# Patient Record
Sex: FEMALE | Race: WHITE | Employment: OTHER | URBAN - METROPOLITAN AREA
[De-identification: names, ages, dates, MRNs, and addresses within clinical notes are randomized per-mention and may not be internally consistent; named-entity substitution may affect disease eponyms.]

---

## 2017-01-11 DIAGNOSIS — F41.9 ANXIETY: ICD-10-CM

## 2017-01-16 RX ORDER — LORAZEPAM 0.5 MG/1
TABLET ORAL
Qty: 30 TAB | Refills: 0 | OUTPATIENT
Start: 2017-01-16 | End: 2017-03-16 | Stop reason: SDUPTHER

## 2017-01-16 NOTE — TELEPHONE ENCOUNTER
Attempted to contact patient without success.  Left voicemail message on home number requesting a call back to the office to schedule follow up appointment

## 2017-01-16 NOTE — TELEPHONE ENCOUNTER
Orders Placed This Encounter    LORazepam (ATIVAN) 0.5 mg tablet     Sig: TAKE 1 TABLET BY MOUTH EVERY DAY AS NEEDED     Dispense:  30 Tab     Refill:  0       reviewed 1/16/2017   Patient scheduled for 2/27/17

## 2017-02-24 ENCOUNTER — HOSPITAL ENCOUNTER (OUTPATIENT)
Dept: MAMMOGRAPHY | Age: 62
Discharge: HOME | End: 2017-02-24
Attending: INTERNAL MEDICINE
Payer: COMMERCIAL

## 2017-02-24 ENCOUNTER — HOSPITAL ENCOUNTER (OUTPATIENT)
Dept: LAB | Age: 62
Discharge: HOME | End: 2017-02-24
Payer: COMMERCIAL

## 2017-02-24 ENCOUNTER — OFFICE VISIT (OUTPATIENT)
Dept: INTERNAL MEDICINE CLINIC | Age: 62
End: 2017-02-24

## 2017-02-24 VITALS
RESPIRATION RATE: 18 BRPM | SYSTOLIC BLOOD PRESSURE: 140 MMHG | WEIGHT: 193.8 LBS | DIASTOLIC BLOOD PRESSURE: 80 MMHG | HEART RATE: 61 BPM | HEIGHT: 67 IN | BODY MASS INDEX: 30.42 KG/M2 | TEMPERATURE: 99.2 F | OXYGEN SATURATION: 98 %

## 2017-02-24 DIAGNOSIS — Z11.59 SCREENING FOR VIRAL DISEASE: ICD-10-CM

## 2017-02-24 DIAGNOSIS — Z01.419 WELL WOMAN EXAM WITH ROUTINE GYNECOLOGICAL EXAM: Primary | ICD-10-CM

## 2017-02-24 DIAGNOSIS — Z12.31 SCREENING MAMMOGRAM, ENCOUNTER FOR: ICD-10-CM

## 2017-02-24 DIAGNOSIS — F17.200 SMOKING: ICD-10-CM

## 2017-02-24 DIAGNOSIS — Z01.419 WELL WOMAN EXAM WITH ROUTINE GYNECOLOGICAL EXAM: ICD-10-CM

## 2017-02-24 DIAGNOSIS — E66.9 OBESITY (BMI 30-39.9): ICD-10-CM

## 2017-02-24 DIAGNOSIS — F41.9 ANXIETY: ICD-10-CM

## 2017-02-24 PROBLEM — Z79.899 CONTROLLED SUBSTANCE AGREEMENT SIGNED: Chronic | Status: ACTIVE | Noted: 2017-02-24

## 2017-02-24 PROBLEM — Z71.89 ADVANCE DIRECTIVE DISCUSSED WITH PATIENT: Chronic | Status: ACTIVE | Noted: 2017-02-24

## 2017-02-24 PROCEDURE — 87624 HPV HI-RISK TYP POOLED RSLT: CPT | Performed by: INTERNAL MEDICINE

## 2017-02-24 PROCEDURE — 88175 CYTOPATH C/V AUTO FLUID REDO: CPT | Performed by: INTERNAL MEDICINE

## 2017-02-24 PROCEDURE — 77067 SCR MAMMO BI INCL CAD: CPT

## 2017-02-24 RX ORDER — CLARITHROMYCIN 500 MG/1
TABLET, FILM COATED, EXTENDED RELEASE ORAL
COMMUNITY
Start: 2017-02-22 | End: 2017-02-24 | Stop reason: ALTCHOICE

## 2017-02-24 RX ORDER — PREDNISONE 20 MG/1
TABLET ORAL
COMMUNITY
Start: 2017-02-22 | End: 2017-02-24 | Stop reason: ALTCHOICE

## 2017-02-24 RX ORDER — DIAZEPAM 10 MG/1
TABLET ORAL
Refills: 0 | COMMUNITY
Start: 2017-02-05 | End: 2017-02-24 | Stop reason: ALTCHOICE

## 2017-02-24 RX ORDER — HYDROCODONE BITARTRATE AND ACETAMINOPHEN 7.5; 325 MG/1; MG/1
TABLET ORAL
Refills: 0 | COMMUNITY
Start: 2017-02-08 | End: 2017-02-24 | Stop reason: ALTCHOICE

## 2017-02-24 NOTE — MR AVS SNAPSHOT
Visit Information     Date & Time Provider Department Dept. Phone Encounter #    2/24/2017 10:00 AM Radha Johns MD [place] [id number] [facility] [phone] [id number]      Follow-up Instructions     Return in about 1 year (around 2/24/2018). Upcoming Health Maintenance        Date Due    BREAST CANCER SCRN MAMMOGRAM 12/22/2017    COLONOSCOPY 10/13/2019    PAP AKA CERVICAL CYTOLOGY 2/24/2020    DTaP/Tdap/Td series (3 - Td) 4/16/2026      Allergies as of 2/24/2017  Review Complete On: 2/24/2017 By: Radha Johns MD       Severity Noted Reaction Type Reactions    Sulfa (Sulfonamide Antibiotics) Medium 02/15/2016   Side Effect Hives    1 week after starting Bactrim - Feb 2016    Clindamycin  11/10/2011    Rash    Keflex [Cephalexin]  11/10/2011    Rash    Morphine  10/12/2014    Other (comments)    Dysphoria/anxiety    Pcn [Penicillins]  10/27/2011   Side Effect Rash      Current Immunizations  Reviewed on 10/27/2011    Name Date    Influenza Vaccine 10/17/2015, 11/16/2014, 9/1/2013    Influenza Vaccine Whole 11/1/2003    TDAP Vaccine 10/27/2011 10:38 AM    Tdap 4/16/2016  6:18 PM       Not reviewed this visit   You Were Diagnosed With        Codes Comments    Well woman exam with routine gynecological exam    -  Primary ICD-10-CM: Z01.419  ICD-9-CM: V72.31     Screening mammogram, encounter for     ICD-10-CM: Z12.31  ICD-9-CM: V76.12     Obesity (BMI 30-39. 9)     ICD-10-CM: E66.9  ICD-9-CM: 278.00     Screening for viral disease     ICD-10-CM: Z11.59  ICD-9-CM: V73.99     Smoking     ICD-10-CM: F17.200  ICD-9-CM: 305.1       Vitals     BP                   140/80 (BP 1 Location: Left arm, BP Patient Position: Sitting)           BMI and BSA Data     Body Mass Index Body Surface Area    30.35 kg/m 2 2.04 m 2         Preferred Pharmacy       Pharmacy Name Phone    [id number] [address], [place]  [phone]         Your Updated Medication List This list is accurate as of: 2/24/17 11:10 AM.  Always use your most recent med list.                ascorbic acid (vitamin C) 500 mg tablet   Commonly known as:  VITAMIN C   Take 500 mg by mouth daily. B COMPLEX 1 PO   Take 1 Cap by mouth daily. cetirizine 10 mg tablet   Commonly known as:  ZYRTEC   Take 10 mg by mouth daily as needed for Allergies. cholecalciferol 1,000 unit tablet   Commonly known as:  VITAMIN D3   Take 1,000 Units by mouth daily. fluticasone 50 mcg/actuation nasal spray   Commonly known as:  FLONASE   1 Savage by Both Nostrils route daily. LORazepam 0.5 mg tablet   Commonly known as:  ATIVAN   TAKE 1 TABLET BY MOUTH EVERY DAY AS NEEDED       VITAMIN E PO   Take 1 Cap by mouth daily. We Performed the Following     CBC WITH AUTOMATED DIFF [17935 CPT(R)]     HEPATITIS C AB [07891 CPT(R)]     LIPID PANEL [76094 CPT(R)]     METABOLIC PANEL, COMPREHENSIVE [76068 CPT(R)]     PAP IG, HPV AND RFX HPV 30/12,79([id number]) [02073 CPT(R)]     VITAMIN D, 25 HYDROXY [93679 CPT(R)]       Follow-up Instructions     Return in about 1 year (around 2/24/2018). To-Do List     02/24/2017     Imaging:  [facility] MAMMO BI SCREENING INCL CAD          Introducing [facility]! [employer] introduces [employer] patient portal. Now you can access parts of your medical record, email your doctor's office, and request medication refills online. 1. In your internet browser, go to [email or web address]   2. Click on the First Time User? Click Here link in the Sign In box. You will see the New Member Sign Up page. 3. Enter your BB[employer] Access Code exactly as it appears below. You will not need to use this code after youve completed the sign-up process. If you do not sign up before the expiration date, you must request a new code. · [employer] Access Code: [email or web address]  Expires: 5/25/2017 11:06 AM    4.  Enter the last four digits of your Social Security Number (xxxx) and Date of Birth (mm/dd/yyyy) as indicated and click Submit. You will be taken to the next sign-up page. 5. Create a AptDeco ID. This will be your AptDeco login ID and cannot be changed, so think of one that is secure and easy to remember. 6. Create a AptDeco password. You can change your password at any time. 7. Enter your Password Reset Question and Answer. This can be used at a later time if you forget your password. 8. Enter your e-mail address. You will receive e-mail notification when new information is available in [address] 9. Click Sign Up. You can now view and download portions of your medical record. 10. Click the Download Summary menu link to download a portable copy of your medical information. If you have questions, please visit the Frequently Asked Questions section of the AptDeco website. Remember, AptDeco is NOT to be used for urgent needs. For medical emergencies, dial 911. Now available from your iPhone and Android! Please provide this summary of care documentation to your next provider. Your primary care clinician is listed as Milly Au. If you have any questions after today's visit, please call [phone].

## 2017-02-24 NOTE — PROGRESS NOTES
Subjective:      Kingsley Renner is a 63 y.o. female who presents today for her well woman's exam.     Gyn care is provided by myself . - last visit was 12/15  Screening mammogram was done at [facility] - date- 12/15. Going today  Screening colonoscopy was last completed 2014, with Dr. Tien Quiroz -  Nothing regular    Still smoking 15 cigarettes daily. Not ready to quit  Tuesday evening she went to [facility] for URI and given antibiotic and prednisone. Feeling a little better. 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 01XA     Current Outpatient Prescriptions   Medication Sig Dispense Refill    LORazepam (ATIVAN) 0.5 mg tablet TAKE 1 TABLET BY MOUTH EVERY DAY AS NEEDED 30 Tab 0    cholecalciferol (VITAMIN D3) 1,000 unit tablet Take 1,000 Units by mouth daily.  fluticasone (FLONASE) 50 mcg/actuation nasal spray 1 Johnson by Both Nostrils route daily.  cetirizine (ZYRTEC) 10 mg tablet Take 10 mg by mouth daily as needed for Allergies.  ascorbic acid (VITAMIN C) 500 mg tablet Take 500 mg by mouth daily.  VITAMIN E ACETATE (VITAMIN E PO) Take 1 Cap by mouth daily.  VITAMIN B COMPLEX (B COMPLEX 1 PO) Take 1 Cap by mouth daily. Review of Systems    A comprehensive review of systems was negative except for that written in the HPI. Objective:      Wt Readings from Last 3 Encounters:   02/24/17 193 lb 12.8 oz (87.9 kg)   11/22/16 195 lb (88.5 kg)   11/08/16 193 lb (87.5 kg)     Temp Readings from Last 3 Encounters:   02/24/17 99.2 °F (37.3 °C) (Oral)   11/22/16 98 °F (36.7 °C) (Oral) 11/08/16 97.1 °F (36.2 °C) (Oral)     BP Readings from Last 3 Encounters:   02/24/17 140/80   11/22/16 120/82   11/08/16 130/62     Pulse Readings from Last 3 Encounters:   02/24/17 61   11/22/16 71   11/08/16 66      Visit Vitals    /80 (BP 1 Location: Left arm, BP Patient Position: Sitting)    Pulse 61    Temp 99.2 °F (37.3 °C) (Oral)    Resp 18    Ht 5' 7\" (1.702 m)    Wt 193 lb 12.8 oz (87.9 kg)    LMP 10/28/2002    SpO2 98%    BMI 30.35 kg/m2     General:  Alert, cooperative, no distress, appears stated age. Head:  Normocephalic, without obvious abnormality, atraumatic. Eyes:  Conjunctivae/corneas clear. PERRL, EOMs intact. Ears:  Normal TMs and external ear canals both ears. Nose: Nares normal. Septum midline. Mucosa normal. No drainage or sinus tenderness. Throat: Lips, mucosa, and tongue normal. Teeth and gums normal.   Neck: Supple, symmetrical, trachea midline, no adenopathy, thyroid: no enlargement/tenderness/nodules, no carotid bruit and no JVD. Back:   Symmetric, no curvature. ROM normal. No CVA tenderness. Lungs:   Clear to auscultation bilaterally. Heart:  Regular rate and rhythm, S1, S2 normal, no murmur, click, rub or gallop. Breast Exam:  No tenderness, masses, or nipple abnormality. Abdomen:   Soft, non-tender. Bowel sounds normal. No masses,  No organomegaly. Vaginal:  External genitalia in normal limits. Vaginal vault without discharge. Cervix intact without abnormality. No CMT       Extremities: Extremities normal, atraumatic, no cyanosis or edema. Pulses: 2+ and symmetric all extremities. Skin: Skin color, texture, turgor normal. No rashes or lesions. Lymph nodes: Cervical, supraclavicular, and axillary nodes normal.           Assessment/Plan:     1. Well woman exam with routine gynecological exam  -pap done today  -she is getting mammogram today. - [facility] MAMMO BI SCREENING INCL CAD;  Future  - CBC WITH AUTOMATED DIFF  - METABOLIC PANEL, COMPREHENSIVE  - LIPID PANEL  - VITAMIN D, 25 HYDROXY  - PAP IG, HPV AND RFX HPV 16/78,65([id number])    2. Screening mammogram, encounter for    - [facility] MAMMO BI SCREENING INCL CAD; Future    Also, she has additional complaints of the following --.     3. Obesity (BMI 30-39.9)  -encouraged her to work on increasing her activity. Encouraged her to walk briskly for     4. Screening for viral disease    - HEPATITIS C AB    5. Smoking  -she is not ready to quit.    -encouraged at least reducing from her current 15 cigarettes to 10 cigarettes    6. Anxiety  -using ativan prn very sparingly  -controlled substance agreement signed for use of ativan. Orders Placed This Encounter    [facility] MAMMO BI SCREENING INCL CAD     Standing Status:   Future     Number of Occurrences:   1     Standing Expiration Date:   8/25/2017     Order Specific Question:   Reason for Exam     Answer:   breast canc screen    CBC WITH AUTOMATED DIFF    METABOLIC PANEL, COMPREHENSIVE    LIPID PANEL    VITAMIN D, 25 HYDROXY    HEPATITIS C AB    DISCONTD: FLUCELVAX QUAD 6723-8749, PF, syrg injection     Sig: TO BE ADMINISTERED BY PHARMACIST FOR IMMUNIZATION     Refill:  0    DISCONTD: predniSONE (DELTASONE) 20 mg tablet    DISCONTD: CHERATUSSIN AC  mg/5 mL solution    DISCONTD: clarithromycin XL (BIAXIN XL) 500 mg SR tablet    DISCONTD: HYDROcodone-acetaminophen (NORCO) 7.5-325 mg per tablet     Sig: TAKE 1 TABLET EVERY 4 HOURS AS NEEDED FOR PAIN     Refill:  0    DISCONTD: diazePAM (VALIUM) 10 mg tablet     Sig: TAKE 1 TABLET BY MOUTH 60 MINUTES BEFORE APPOINTMENT WITH A SMALL SIP OF WATER     Refill:  0    PAP IG, HPV AND RFX HPV 83/53,46([id number])     Order Specific Question:   Pap Source? Answer:   Cervical     Order Specific Question:   Total Hysterectomy? Answer:   No     Order Specific Question:   Supracervical Hysterectomy?      Answer:   No     Order Specific Question:   Post Menopausal?     Answer:   Yes     Order Specific Question:   Hormone Therapy? Answer:   No     Order Specific Question:   IUD? Answer:   No     Order Specific Question:   Abnormal Bleeding? Answer:   No     Order Specific Question:   Pregnant     Answer:   No     Order Specific Question:   Post Partum? Answer:   No     Order Specific Question:   Pap collection method? Answer:   broom         Follow-up Disposition:     Follow up yearly      Return if symptoms worsen or fail to improve. Advised patient to call back or return to office if symptoms worsen/change/persist.     Discussed expected course/resolution/complications of diagnosis in detail with patient. Medication risks/benefits/costs/interactions/alternatives discussed with patient. Patient was given an after visit summary which includes diagnoses, current medications, & vitals. Patient expressed understanding with the diagnosis and plan.

## 2017-03-16 DIAGNOSIS — F41.9 ANXIETY: ICD-10-CM

## 2017-03-17 RX ORDER — LORAZEPAM 0.5 MG/1
0.5 TABLET ORAL
Qty: 30 TAB | Refills: 1 | OUTPATIENT
Start: 2017-03-17 | End: 2017-10-11 | Stop reason: SDUPTHER

## 2017-03-17 NOTE — TELEPHONE ENCOUNTER
Orders Placed This Encounter    LORazepam (ATIVAN) 0.5 mg tablet     Sig: Take 1 Tab by mouth daily as needed for Anxiety.      Dispense:  30 Tab     Refill:  1     Not to exceed 5 additional fills before 07/15/2017       reviewed 3/17/2017

## 2017-05-03 ENCOUNTER — OFFICE VISIT (OUTPATIENT)
Dept: INTERNAL MEDICINE CLINIC | Age: 62
End: 2017-05-03

## 2017-05-03 VITALS
TEMPERATURE: 98.8 F | WEIGHT: 191 LBS | DIASTOLIC BLOOD PRESSURE: 80 MMHG | HEART RATE: 63 BPM | HEIGHT: 67 IN | OXYGEN SATURATION: 97 % | SYSTOLIC BLOOD PRESSURE: 125 MMHG | BODY MASS INDEX: 29.98 KG/M2 | RESPIRATION RATE: 18 BRPM

## 2017-05-03 DIAGNOSIS — J02.9 ACUTE PHARYNGITIS, UNSPECIFIED ETIOLOGY: ICD-10-CM

## 2017-05-03 DIAGNOSIS — J06.9 URI WITH COUGH AND CONGESTION: Primary | ICD-10-CM

## 2017-05-03 DIAGNOSIS — H66.93 BILATERAL OTITIS MEDIA, UNSPECIFIED CHRONICITY, UNSPECIFIED OTITIS MEDIA TYPE: ICD-10-CM

## 2017-05-03 DIAGNOSIS — J40 BRONCHITIS: ICD-10-CM

## 2017-05-03 RX ORDER — CLARITHROMYCIN 500 MG/1
500 TABLET, FILM COATED ORAL 2 TIMES DAILY
Qty: 14 TAB | Refills: 0 | Status: SHIPPED | OUTPATIENT
Start: 2017-05-03 | End: 2017-05-10

## 2017-05-03 NOTE — MR AVS SNAPSHOT
Visit Information     Date & Time Provider Department Dept. Phone Encounter #    5/3/2017  3:40 PM [facility] [phone] [id number]      Your Appointments     2/26/2018 10:00 AM   COMPLETE 40 with MD J[facility]   Appt Note: cp    [address]           [address] Maintenance        Date Due    INFLUENZA AGE 9 TO ADULT 8/1/2017    BREAST CANCER SCRN MAMMOGRAM 2/24/2019    COLONOSCOPY 10/13/2019    PAP AKA CERVICAL CYTOLOGY 2/24/2020    DTaP/Tdap/Td series (3 - Td) 4/16/2026      Allergies as of 5/3/2017  Review Complete On: 5/3/2017 By: Daryl Hernandez NP       Severity Noted Reaction Type Reactions    Sulfa (Sulfonamide Antibiotics) Medium 02/15/2016   Side Effect Hives    1 week after starting Bactrim - Feb 2016    Clindamycin  11/10/2011    Rash    Keflex [Cephalexin]  11/10/2011    Rash    Morphine  10/12/2014    Other (comments)    Dysphoria/anxiety    Pcn [Penicillins]  10/27/2011   Side Effect Rash      Current Immunizations  Reviewed on 10/27/2011    Name Date    Influenza Vaccine 10/17/2015, 11/16/2014, 9/1/2013    Influenza Vaccine Whole 11/1/2003    TDAP Vaccine 10/27/2011 10:38 AM    Tdap 4/16/2016  6:18 PM       Not reviewed this visit   You Were Diagnosed With        Codes Comments    URI with cough and congestion    -  Primary ICD-10-CM: J06.9  ICD-9-CM: 465.9     Bilateral otitis media, unspecified chronicity, unspecified otitis media type     ICD-10-CM: H66.93  ICD-9-CM: 382. 9     Acute pharyngitis, unspecified etiology     ICD-10-CM: J02.9  ICD-9-CM: 462     Bronchitis     ICD-10-CM: J40  ICD-9-CM: 490       Vitals     BP Pulse Temp Resp Height(growth percentile) Weight(growth percentile)    125/80 (BP 1 Location: Left arm, BP Patient Position: Sitting) 63 98.8 °F (37.1 °C) (Oral) 18 5' 7\" (1.702 m) 191 lb (86.6 kg)    LMP SpO2 BMI OB Status Smoking Status       10/28/2002 (Exact Date) 97% 29.91 kg/m2 Postmenopausal Current Every Day Smoker     Vitals History      BMI and BSA Data     Body Mass Index Body Surface Area    29.91 kg/m 2 2.02 m 2         Preferred Pharmacy       Pharmacy Name Phone    [facility] [address], [place] [address] [phone]         Your Updated Medication List          This list is accurate as of: 5/3/17  4:48 PM.  Always use your most recent med list.                ascorbic acid (vitamin C) 500 mg tablet   Commonly known as:  VITAMIN C   Take 500 mg by mouth daily. B COMPLEX 1 PO   Take 1 Cap by mouth daily. cholecalciferol 1,000 unit tablet   Commonly known as:  VITAMIN D3   Take 1,000 Units by mouth daily. clarithromycin 500 mg tablet   Commonly known as:  BIAXIN   Take 1 Tab by mouth two (2) times a day for 7 days. fluticasone 50 mcg/actuation nasal spray   Commonly known as:  FLONASE   1 Blue by Both Nostrils route daily. LORazepam 0.5 mg tablet   Commonly known as:  ATIVAN   Take 1 Tab by mouth daily as needed for Anxiety. Prescriptions Sent to Pharmacy        Refills    clarithromycin (BIAXIN) 500 mg tablet 0    Sig: Take 1 Tab by mouth two (2) times a day for 7 days. Class: Normal    Pharmacy: [address] [place] [address], [address]  #: [phone]    Route: Oral      Patient Instructions    1. Mucinex (Guaifenesen) plain-Blue Box 600 mg. Take one twice daily with full glass water   Take for 10 days    2. Saline Nasal Spray - use liberally to flush post-nasal area; use as many times a day as desired. Keep spraying with head tilted back until you feel need to swallow    3. Drink lots of fluids (mainly water) to keep mucus thinner    4. If needed, for cough, we recommend Delsym cough syrup    5. Long acting antihistamine (Allegra/Fexofenadine or Zyrtec/Ceterizine) is useful if allergy symptoms are also present    6. Decongestants should not be used as they actually contribute to overdrying/thickening of the mucus, and can raise the BP and overstimulate the heart    7. Steroid nasal spray (Nasacort AQ) - 2 sprays each nostril once daily; use with head in upright position       Introducing [facility]! [employer] introduces [employer] patient portal. Now you can access parts of your medical record, email your doctor's office, and request medication refills online. 1. In your internet browser, go to [email or web address]   2. Click on the First Time User? Click Here link in the Sign In box. You will see the New Member Sign Up page. 3. Enter your [employer] Access Code exactly as it appears below. You will not need to use this code after youve completed the sign-up process. If you do not sign up before the expiration date, you must request a new code. · [employer] Access Code: [email or web address]  Expires: 5/25/2017 12:06 PM    4. Enter the last four digits of your Social Security Number (xxxx) and Date of Birth (mm/dd/yyyy) as indicated and click Submit. You will be taken to the next sign-up page. 5. Create a [employer] ID. This will be your [employer] login ID and cannot be changed, so think of one that is secure and easy to remember. 6. Create a [employer] password. You can change your password at any time. 7. Enter your Password Reset Question and Answer. This can be used at a later time if you forget your password. 8. Enter your e-mail address. You will receive e-mail notification when new information is available in [address] 9. Click Sign Up. You can now view and download portions of your medical record. 10. Click the Download Summary menu link to download a portable copy of your medical information.     If you have questions, please visit the Frequently Asked Questions section of the PaperShare website. Remember, PaperShare is NOT to be used for urgent needs. For medical emergencies, dial 911. Now available from your iPhone and Android! Please provide this summary of care documentation to your next provider. Your primary care clinician is listed as Milly Au. If you have any questions after today's visit, please call [phone].

## 2017-05-03 NOTE — PROGRESS NOTES
64 yo female reports for 1 week, she has been having congestion, pressure behind the eyes and facial pain, cough and fatigue. She was seen at an urgent care last week due to raw throat, and was dx w/ tonsillitis and tx w/ Zpack. Then she had nasal congestion, went back to them 2 days later, and was tx w/ a Medrol dospak and Sudafed. She has a crackly cough now and yellow sputum. She is using Flonase, saline NS and Mucinex and Loratadine. She is no longer using the Sudafed. She smokes cigarettes. PE: WNWD WF sounds hoarse   T - 98.8   Phar - red   Neck - no nodes   TMs - red and dull   Lungs - coarse sounds in R mid-lung    Imp: URI w/ cough   Otitis media bilat   Acute pharyngitis   Bronchitis    Plan: Biaxin for 5 days   Probiotic   Stop smoking   Mucinex, Saline NS, hydrate, Nasacort AQ  _______________________  Expected course of current diagnosed problem(s) as well as expected progression and possible complications, and desired follow up with provider are discussed with patient. Patient is encouraged to be back in touch with any questions or concerns. Patient expresses understanding of plan of care. Patient is given AVS which includes diagnoses, current medications, vitals.

## 2017-05-03 NOTE — PROGRESS NOTES
Chief Complaint   Patient presents with    Sinus Pain     x 1 week     Pressure Behind the Eyes     x 1 week     Fatigue     x 1 week 1/2    Cough     yellow mucous    Nasal Congestion

## 2017-05-16 ENCOUNTER — OFFICE VISIT (OUTPATIENT)
Dept: INTERNAL MEDICINE CLINIC | Age: 62
End: 2017-05-16

## 2017-05-16 VITALS
HEIGHT: 67 IN | OXYGEN SATURATION: 98 % | DIASTOLIC BLOOD PRESSURE: 96 MMHG | HEART RATE: 80 BPM | SYSTOLIC BLOOD PRESSURE: 138 MMHG | TEMPERATURE: 98.2 F | BODY MASS INDEX: 29.82 KG/M2 | WEIGHT: 190 LBS | RESPIRATION RATE: 14 BRPM

## 2017-05-16 DIAGNOSIS — Z91.09 ENVIRONMENTAL ALLERGIES: Primary | ICD-10-CM

## 2017-05-16 RX ORDER — LORATADINE 10 MG/1
10 TABLET ORAL DAILY
COMMUNITY
End: 2018-01-16

## 2017-05-16 NOTE — MR AVS SNAPSHOT
Visit Information     Date & Time Provider Department Dept.  Phone Encounter #    5/16/2017 12:20 PM MD [facility] [phone] [id number]      Your Appointments     2/26/2018 10:00 AM   COMPLETE 40 with MD [facility] [facility])   Appt Note: cp    [address], [address]           [phone] Maintenance        Date Due    INFLUENZA AGE 9 TO ADULT 8/1/2017    BREAST CANCER SCRN MAMMOGRAM 2/24/2019    COLONOSCOPY 10/13/2019    PAP AKA CERVICAL CYTOLOGY 2/24/2020    DTaP/Tdap/Td series (3 - Td) 4/16/2026      Allergies as of 5/16/2017  Review Complete On: 5/16/2017 By: Trey Dunne MD       Severity Noted Reaction Type Reactions    Sulfa (Sulfonamide Antibiotics) Medium 02/15/2016   Side Effect Hives    1 week after starting Bactrim - Feb 2016    Clindamycin  11/10/2011    Rash    Keflex [Cephalexin]  11/10/2011    Rash    Morphine  10/12/2014    Other (comments)    Dysphoria/anxiety    Pcn [Penicillins]  10/27/2011   Side Effect Rash      Current Immunizations  Reviewed on 10/27/2011    Name Date    Influenza Vaccine 10/17/2015, 11/16/2014, 9/1/2013    Influenza Vaccine Whole 11/1/2003    TDAP Vaccine 10/27/2011 10:38 AM    Tdap 4/16/2016  6:18 PM       Not reviewed this visit   You Were Diagnosed With        Codes Comments    Environmental allergies    -  Primary ICD-10-CM: Z91.09  ICD-9-CM: V15.09       Vitals     BP Pulse Temp Resp Height(growth percentile) Weight(growth percentile)    (!) 138/96 (BP 1 Location: Left arm, BP Patient Position: Sitting) 80 98.2 °F (36.8 °C) (Oral) 14 5' 7\" (1.702 m) 190 lb (86.2 kg)    LMP SpO2 BMI OB Status Smoking Status       10/28/2002 (Exact Date) 98% 29.76 kg/m2 Postmenopausal Current Every Day Smoker     Vitals History      BMI and BSA Data     Body Mass Index Body Surface Area    29.76 kg/m 2 2.02 m 2         Preferred Pharmacy       Pharmacy Name Phone    [facility] [address] [place] [address], [address] [phone]         Your Updated Medication List          This list is accurate as of: 5/16/17  1:05 PM.  Always use your most recent med list.                ascorbic acid (vitamin C) 500 mg tablet   Commonly known as:  VITAMIN C   Take 500 mg by mouth daily. B COMPLEX 1 PO   Take 1 Cap by mouth daily. cholecalciferol 1,000 unit tablet   Commonly known as:  VITAMIN D3   Take 1,000 Units by mouth daily. fluticasone 50 mcg/actuation nasal spray   Commonly known as:  FLONASE   1 [place] by Both Nostrils route daily. loratadine 10 mg tablet   Commonly known as:  CLARITIN   Take 10 mg by mouth daily. LORazepam 0.5 mg tablet   Commonly known as:  ATIVAN   Take 1 Tab by mouth daily as needed for Anxiety. We Performed the Following     REFERRAL TO ALLERGY [REF5 Custom]     Comments:    Please evaluate patient for possible allergy to mold. Referral Information     Referral ID Referred By Referred To       [id number] [facility] [address]         Visits Status Start Date End Date    1 New Request 5/16/17 5/16/18    If your referral has a status of pending review or denied, additional information will be sent to support the outcome of this decision. Introducing [facility] & HEALTH SERVICES! [employer] introduces "[employer] patient portal. Now you can access parts of your medical record, email your doctor's office, and request medication refills online. 1. In your internet browser, go to [email or web address]. [email or web address]   2. Click on the First Time User? Click Here link in the Sign In box. You will see the New Member Sign Up page. 3. Enter your "[employer] Access Code exactly as it appears below.  You will not need to use this code after youve completed the sign-up process. If you do not sign up before the expiration date, you must request a new code. · [employer] Access Code: [email or web address]  Expires: 5/25/2017 12:06 PM    4. Enter the last four digits of your Social Security Number (xxxx) and Date of Birth (mm/dd/yyyy) as indicated and click Submit. You will be taken to the next sign-up page. 5. Create a Zipscene ID. This will be your Zipscene login ID and cannot be changed, so think of one that is secure and easy to remember. 6. Create a [employer]scene password. You can change your password at any time. 7. Enter your Password Reset Question and Answer. This can be used at a later time if you forget your password. 8. Enter your e-mail address. You will receive e-mail notification when new information is available in [address] 9. Click Sign Up. You can now view and download portions of your medical record. 10. Click the Download Summary menu link to download a portable copy of your medical information. If you have questions, please visit the Frequently Asked Questions section of the [employer] website. Remember, [employer] is NOT to be used for urgent needs. For medical emergencies, dial 911. Now available from your iPhone and Android! Please provide this summary of care documentation to your next provider. Your primary care clinician is listed as Milly Au. If you have any questions after today's visit, please call [phone].

## 2017-05-16 NOTE — PROGRESS NOTES
1. Have you been to the ER, urgent care clinic since your last visit? Hospitalized since your last visit? No    2. Have you seen or consulted any other health care providers outside of the [address] [place] [address] since your last visit? Include any pap smears or colon screening.  No     Chief Complaint   Patient presents with    Allergies     discuss sinus issues and mold     Not fasting

## 2017-05-16 NOTE — PROGRESS NOTES
Subjective:      Maria Isabel Flor is a 63 y.o. female who presents today with concerns of allergy to mold. She constantly has nasal and head congestion daily even with use of her claritin and steroid nasal spray daily. She states that symptoms are worse when she is at work. She was able to get her employer to get the building tested for mold. The report found a significant amount of mold in the building. The building is very old with an old central air system and ducts. She would like to see an allergist for further mold allergy testing. 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  LORazepam (ATIVAN) 0.5 mg tablet Take 1 Tab by mouth daily as needed for Anxiety. 30 Tab 1    cholecalciferol (VITAMIN D3) 1,000 unit tablet Take 1,000 Units by mouth daily.  fluticasone (FLONASE) 50 mcg/actuation nasal spray 1 [place] by Both Nostrils route daily.  ascorbic acid (VITAMIN C) 500 mg tablet Take 500 mg by mouth daily.  VITAMIN B COMPLEX (B COMPLEX 1 PO) Take 1 Cap by mouth daily. Review of Systems    Pertinent items are noted in HPI.      Objective:     Visit Vitals    BP (!) 138/96 (BP 1 Location: Left arm, BP Patient Position: Sitting)    Pulse 80    Temp 98.2 °F (36.8 °C) (Oral)    Resp 14    Ht 5' 7\" (1.702 m)    Wt 190 lb (86.2 kg)    LMP 10/28/2002 (Exact Date)    SpO2 98%    BMI 29.76 kg/m2     General appearance: alert, cooperative, no distress, appears stated age  Head: Normocephalic, without obvious abnormality, atraumatic  Ears: normal TM's and external ear canals AU  Nose: Nares normal. Septum midline. Mucosa normal. No drainage or sinus tenderness. Throat: Lips, mucosa, and tongue normal. Teeth and gums normal and abnormal findings: mild oropharyngeal erythema    Assessment/Plan:     1. Environmental allergies  -patient referred to allergist for allergy testing.     - REFERRAL TO ALLERGY     Orders Placed This Encounter    REFERRAL TO ALLERGY     Referral Priority:   Routine     Referral Type:   Consultation     Referral Reason:   Specialty Services Required     Referral Location:   [facility]     Referred to Provider:   Ashli Thomas MD     Requested Specialty:   Allergy       Follow-up Disposition:     Return if symptoms worsen or fail to improve. Advised patient to call back or return to office if symptoms worsen/change/persist.     Discussed expected course/resolution/complications of diagnosis in detail with patient. Medication risks/benefits/costs/interactions/alternatives discussed with patient. Patient was given an after visit summary which includes diagnoses, current medications, & vitals. Patient expressed understanding with the diagnosis and plan.

## 2017-08-28 ENCOUNTER — TELEPHONE (OUTPATIENT)
Dept: INTERNAL MEDICINE CLINIC | Age: 62
End: 2017-08-28

## 2017-08-28 DIAGNOSIS — R42 DIZZINESS: ICD-10-CM

## 2017-08-28 RX ORDER — MECLIZINE HYDROCHLORIDE 25 MG/1
25 TABLET ORAL
Qty: 20 TAB | Refills: 0 | Status: SHIPPED | OUTPATIENT
Start: 2017-08-28 | End: 2017-09-07

## 2017-08-28 NOTE — TELEPHONE ENCOUNTER
Patient states she is experiencing vertigo again, she has been doing the exercises to help with the vertigo, and she is not sure that it is working, she would like to know if the meclozine can be refilled.

## 2017-08-28 NOTE — TELEPHONE ENCOUNTER
Orders Placed This Encounter    meclizine (ANTIVERT) 25 mg tablet     Sig: Take 1 Tab by mouth three (3) times daily as needed for up to 10 days.      Dispense:  20 Tab     Refill:  0

## 2017-10-11 DIAGNOSIS — F41.9 ANXIETY: ICD-10-CM

## 2017-10-12 RX ORDER — LORAZEPAM 0.5 MG/1
TABLET ORAL
Qty: 30 TAB | Refills: 0 | OUTPATIENT
Start: 2017-10-12 | End: 2018-01-09 | Stop reason: SDUPTHER

## 2017-10-12 NOTE — TELEPHONE ENCOUNTER
Rx called into pharmacy VM on file.      Requested Prescriptions     Signed Prescriptions Disp Refills    LORazepam (ATIVAN) 0.5 mg tablet 30 Tab 0     Sig: TAKE 1 TABLET EVERY DAY AS NEEDED FOR ANXIETY     Authorizing Provider: David Guerrier

## 2017-10-12 NOTE — TELEPHONE ENCOUNTER
Orders Placed This Encounter    LORazepam (ATIVAN) 0.5 mg tablet     Sig: TAKE 1 TABLET EVERY DAY AS NEEDED FOR ANXIETY     Dispense:  30 Tab     Refill:  0     This request is for a new prescription for a controlled substance as required by Federal/State law.     reviewed 10/12/2017

## 2017-11-27 ENCOUNTER — OFFICE VISIT (OUTPATIENT)
Dept: INTERNAL MEDICINE CLINIC | Age: 62
End: 2017-11-27

## 2017-11-27 VITALS
SYSTOLIC BLOOD PRESSURE: 114 MMHG | OXYGEN SATURATION: 98 % | TEMPERATURE: 98 F | RESPIRATION RATE: 15 BRPM | WEIGHT: 191 LBS | DIASTOLIC BLOOD PRESSURE: 66 MMHG | HEART RATE: 91 BPM | HEIGHT: 67 IN | BODY MASS INDEX: 29.98 KG/M2

## 2017-11-27 DIAGNOSIS — G57.62 MORTON'S NEUROMA OF LEFT FOOT: Primary | ICD-10-CM

## 2017-11-27 RX ORDER — AZELASTINE HYDROCHLORIDE AND FLUTICASONE PROPIONATE 137; 50 UG/1; UG/1
SPRAY, METERED NASAL
Refills: 11 | COMMUNITY
Start: 2017-10-08 | End: 2018-01-16

## 2017-11-27 NOTE — PROGRESS NOTES
Patient's identity verified with two patient identifiers (name and date of birth). 1. Have you been to the ER, urgent care clinic since your last visit? Hospitalized since your last visit? No  2. Have you seen or consulted any other health care providers outside of the [address] [place] [address] since your last visit? Include any pap smears or colon screening. No    Chief Complaint   Patient presents with    Foot Pain     Top of left foot, occurs when bare/flat footed or flexing, x4wks. Now pain radiates to shin with flexion, some numbness occurs. \"Feels like a taute/pulling tendon\" with flexing. Doesn't notice pain with shoe/slippers. Denies injury or swelling. Not fasting.     Health Maintenance Due   Topic Date Due    ZOSTER VACCINE AGE 60>   Has not had. 06/01/2017    Influenza Age 5 to Adult   [facility] per patient, [facility]/2017, [facility]

## 2017-11-27 NOTE — MR AVS SNAPSHOT
Visit Information     Date & Time Provider Department Dept. Phone Encounter #    11/27/2017 12:20 PM MD [facility] [phone] [id number]      Follow-up Instructions     Return if symptoms worsen or fail to improve. Your Appointments     2/26/2018 10:00 AM   COMPLETE 40 with MD [facility]   Appt Note: cp    [address], [address]           [address], [address] [address]32 Maintenance        Date Due    ZOSTER VACCINE AGE 60> 6/1/2017    BREAST CANCER SCRN MAMMOGRAM 2/24/2019    PAP AKA CERVICAL CYTOLOGY 2/24/2020    DTaP/Tdap/Td series (3 - Td) 4/16/2026      Allergies as of 11/27/2017  Review Complete On: 11/27/2017 By: Judy Infante MD       Severity Noted Reaction Type Reactions    Sulfa (Sulfonamide Antibiotics) Medium 02/15/2016   Side Effect Hives    1 week after starting Bactrim - Feb 2016    Clindamycin  11/10/2011    Rash    Keflex [Cephalexin]  11/10/2011    Rash    Morphine  10/12/2014    Other (comments)    Dysphoria/anxiety    Pcn [Penicillins]  10/27/2011   Side Effect Rash      Current Immunizations  Reviewed on 10/27/2011    Name Date    Influenza Vaccine 10/2/2017, 10/17/2015, 11/16/2014, 9/1/2013    Influenza Vaccine Whole 11/1/2003    TDAP Vaccine 10/27/2011 10:38 AM    Tdap 4/16/2016  6:18 PM       Not reviewed this visit   You Were Diagnosed With        Codes Comments    Owusu's neuroma of left foot    -  Primary ICD-10-CM: G57.62  ICD-9-CM: 886. 6       Vitals     BP Pulse Temp Resp Height(growth percentile) Weight(growth percentile)    114/66 (BP 1 Location: Left arm, BP Patient Position: Sitting) 91 98 °F (36.7 °C) (Oral) 15 5' 7\" (1.702 m) 191 lb (86.6 kg)    LMP SpO2 BMI OB Status Smoking Status       10/28/2002 (Exact Date) 98% 29.91 kg/m2 Postmenopausal Current Every Day Smoker     Vitals History      BMI and BSA Data     Body Mass Index Body Surface Area    29.91 kg/m 2 2.02 m 2         Preferred Pharmacy       Pharmacy Name Phone    [id number] [address], [address] [phone]         Your Updated Medication List          This list is accurate as of: 11/27/17 12:48 PM.  Always use your most recent med list.                ascorbic acid (vitamin C) 500 mg tablet   Commonly known as:  VITAMIN C   Take 500 mg by mouth daily. B COMPLEX 1 PO   Take 1 Cap by mouth daily. cholecalciferol 1,000 unit tablet   Commonly known as:  VITAMIN D3   Take 1,000 Units by mouth daily. DYMISTA 137-50 mcg/spray Spry   Generic drug:  azelastine-fluticasone   INSTILL 1 PUFF INTO BOTH NOSTRILS 2 TIMES A DAY       loratadine 10 mg tablet   Commonly known as:  CLARITIN   Take 10 mg by mouth daily. LORazepam 0.5 mg tablet   Commonly known as:  ATIVAN   TAKE 1 TABLET EVERY DAY AS NEEDED FOR ANXIETY               We Performed the Following     REFERRAL TO ORTHOPEDICS [EEQ502 Custom]       Follow-up Instructions     Return if symptoms worsen or fail to improve. Referral Information     Referral ID Referred By Referred To       [id number] [facility], [address]       Phone: [phone]       Fax: [phone]         Visits Status Start Date End Date    1 New Request 11/27/17 11/27/18    If your referral has a status of pending review or denied, additional information will be sent to support the outcome of this decision. Patient Instructions         Owusu's Neuroma: Care Instructions  Your Care Instructions  When your toes are squeezed together, often over a period of months or even years, the nerve that runs between the toes can swell and get thicker. This is called a Owusu's neuroma. It may feel like a small lump is pushing inside the ball of your foot.  When you walk or move your toes, you feel pain that sometimes moves into your toes. If the pressure continues, it may damage the nerve. If you catch the problem early and change your shoes, the nerve swelling may go away. Your doctor may advise you to wear wide-toed shoes. He or she also may suggest that you ice the sore spot and limit activities that put pressure on the nerve. If these steps do not help your symptoms, your doctor may have you use special pads or devices that spread the toes. This keeps them from squeezing the nerve. In some cases, you may get a cortisone shot to reduce swelling and pain. If these treatments don't help, your doctor may suggest surgery to relieve pressure or remove the swollen nerve. Follow-up care is a key part of your treatment and safety. Be sure to make and go to all appointments, and call your doctor if you are having problems. It's also a good idea to know your test results and keep a list of the medicines you take. How can you care for yourself at home? · Ask your doctor if you can take an over-the-counter pain medicine, such as acetaminophen (Tylenol), ibuprofen (Advil, Motrin), or naproxen (Aleve). Be safe with medicines. Read and follow all instructions on the label. · Try to stay off your feet as much as possible until the pain and swelling go away. · Avoid wearing tight, pointy, or high-heeled shoes. Instead, wear roomy footwear. · Put ice or a cold pack on the area for 10 to 20 minutes at a time. Put a thin cloth between the ice and your skin. · Try massaging your feet. This relaxes the muscles around the nerve. · If your doctor prescribed special pads or a device to relieve pressure on your toes, use these items as directed. · Until all pain and swelling go away, avoid activities that put pressure on the toes. These include racquet sports and running. When should you call for help?   Watch closely for changes in your health, and be sure to contact your doctor if:  ? · You do not get better as expected. Where can you learn more? Go to http://melodie-ramu.info/. Enter E100 in the search box to learn more about \"Owusu's Neuroma: Care Instructions. \"  Current as of: March 21, 2017  Content Version: 11.4  © 6095-3745 [employer]. Care instructions adapted under license by [employer] (which disclaims liability or warranty for this information). If you have questions about a medical condition or this instruction, always ask your healthcare professional. [address] any warranty or liability for your use of this information. Aleve 2 tabs with food every 12 hours for 5 days  Wear supportive footwear         Introducing [facility] & HEALTH SERVICES! [facility] [employer] introduces [employer] patient portal. Now you can access parts of your medical record, email your doctor's office, and request medication refills online. 1. In your internet browser, go to [email or web address]   2. Click on the First Time User? Click Here link in the Sign In box. You will see the New Member Sign Up page. 3. Enter your [employer]ular Access Code exactly as it appears below. You will not need to use this code after youve completed the sign-up process. If you do not sign up before the expiration date, you must request a new code. · [employer] Access Code: [email or web address]  Expires: 2/25/2018 12:46 PM    4. Enter the last four digits of your Social Security Number (xxxx) and Date of Birth (mm/dd/yyyy) as indicated and click Submit. You will be taken to the next sign-up page. 5. Create a Circular ID. This will be your Circular login ID and cannot be changed, so think of one that is secure and easy to remember. 6. Create a Circular password. You can change your password at any time. 7. Enter your Password Reset Question and Answer. This can be used at a later time if you forget your password. 8. Enter your e-mail address.  You will receive e-mail notification when new information is available in [address] 9. Click Sign Up. You can now view and download portions of your medical record. 10. Click the Download Summary menu link to download a portable copy of your medical information. If you have questions, please visit the Frequently Asked Questions section of the [employer] website. Remember, [employer] is NOT to be used for urgent needs. For medical emergencies, dial 911. Now available from your iPhone and Android! Please provide this summary of care documentation to your next provider. Your primary care clinician is listed as Milly Au. If you have any questions after today's visit, please call [phone].

## 2017-11-27 NOTE — PATIENT INSTRUCTIONS
Owusu's Neuroma: Care Instructions  Your Care Instructions  When your toes are squeezed together, often over a period of months or even years, the nerve that runs between the toes can swell and get thicker. This is called a Owusu's neuroma. It may feel like a small lump is pushing inside the ball of your foot. When you walk or move your toes, you feel pain that sometimes moves into your toes. If the pressure continues, it may damage the nerve. If you catch the problem early and change your shoes, the nerve swelling may go away. Your doctor may advise you to wear wide-toed shoes. He or she also may suggest that you ice the sore spot and limit activities that put pressure on the nerve. If these steps do not help your symptoms, your doctor may have you use special pads or devices that spread the toes. This keeps them from squeezing the nerve. In some cases, you may get a cortisone shot to reduce swelling and pain. If these treatments don't help, your doctor may suggest surgery to relieve pressure or remove the swollen nerve. Follow-up care is a key part of your treatment and safety. Be sure to make and go to all appointments, and call your doctor if you are having problems. It's also a good idea to know your test results and keep a list of the medicines you take. How can you care for yourself at home? · Ask your doctor if you can take an over-the-counter pain medicine, such as acetaminophen (Tylenol), ibuprofen (Advil, Motrin), or naproxen (Aleve). Be safe with medicines. Read and follow all instructions on the label. · Try to stay off your feet as much as possible until the pain and swelling go away. · Avoid wearing tight, pointy, or high-heeled shoes. Instead, wear roomy footwear. · Put ice or a cold pack on the area for 10 to 20 minutes at a time. Put a thin cloth between the ice and your skin. · Try massaging your feet. This relaxes the muscles around the nerve.   · If your doctor prescribed special pads or a device to relieve pressure on your toes, use these items as directed. · Until all pain and swelling go away, avoid activities that put pressure on the toes. These include racquet sports and running. When should you call for help? Watch closely for changes in your health, and be sure to contact your doctor if:  ? · You do not get better as expected. Where can you learn more? Go to http://melodie-ramu.info/. Enter E100 in the search box to learn more about \"Owusu's Neuroma: Care Instructions. \"  Current as of: March 21, 2017  Content Version: 11.4  © 8049-8620 [employer]. Care instructions adapted under license by [employer] (which disclaims liability or warranty for this information). If you have questions about a medical condition or this instruction, always ask your healthcare professional. [address] any warranty or liability for your use of this information.       Aleve 2 tabs with food every 12 hours for 5 days  Wear supportive footwear

## 2017-11-27 NOTE — PROGRESS NOTES
Subjective:      Robson Freire is a 63 y.o. female who presents today with left foot pain. Pain started about 4 weeks ago. No trauma. Hurts with walking barfoot or flexing of her toes. Pain can radiate up her leg. No redness or swelling. Pain is not present if she is wearing shoes. 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  cholecalciferol (VITAMIN D3) 1,000 unit tablet Take 1,000 Units by mouth daily.  ascorbic acid (VITAMIN C) 500 mg tablet Take 500 mg by mouth daily.  VITAMIN B COMPLEX (B COMPLEX 1 PO) Take 1 Cap by mouth daily. Review of Systems    Pertinent items are noted in HPI. Objective:     Visit Vitals    /66 (BP 1 Location: Left arm, BP Patient Position: Sitting)    Pulse 91    Temp 98 °F (36.7 °C) (Oral)    Resp 15    Ht 5' 7\" (1.702 m)    Wt 191 lb (86.6 kg)    LMP 10/28/2002 (Exact Date)  Comment: age 54    SpO2 98%    BMI 29.91 kg/m2     General appearance: alert, cooperative, no distress, appears stated age  Head: Normocephalic, without obvious abnormality, atraumatic  Left foot: pain to palpation and pressure in the web between her second and third toe. Assessment/Plan:     1.  Owusu's neuroma of left foot  -wear supportive shoes  -aleve 2 tab bid for next 5 days with food.  -if it is not better at that time. Recommended evaluation by orthopedics - referred to Dr. Mayra Mooney This Encounter    REFERRAL TO ORTHOPEDICS     Referral Priority:   Routine     Referral Type:   Consultation     Referral Reason:   Specialty Services Required     Referred to Provider:   Danny Brantley MD     Follow-up Disposition:     Return if symptoms worsen or fail to improve. Advised patient to call back or return to office if symptoms worsen/change/persist.     Discussed expected course/resolution/complications of diagnosis in detail with patient. Medication risks/benefits/costs/interactions/alternatives discussed with patient. Patient was given an after visit summary which includes diagnoses, current medications, & vitals. Patient expressed understanding with the diagnosis and plan.

## 2018-01-09 DIAGNOSIS — F41.9 ANXIETY: ICD-10-CM

## 2018-01-10 RX ORDER — LORAZEPAM 0.5 MG/1
TABLET ORAL
Qty: 30 TAB | Refills: 0 | OUTPATIENT
Start: 2018-01-10 | End: 2018-04-24 | Stop reason: SDUPTHER

## 2018-01-10 NOTE — TELEPHONE ENCOUNTER
Orders Placed This Encounter    LORazepam (ATIVAN) 0.5 mg tablet     Sig: TAKE 1 TABLET BY MOUTH EVERY DAY     Dispense:  30 Tab     Refill:  0     Not to exceed 5 additional fills before 04/10/2018       reviewed 1/10/2018

## 2018-01-11 NOTE — TELEPHONE ENCOUNTER
Rx called into pharmacy VM on file.     Requested Prescriptions     Signed Prescriptions Disp Refills    LORazepam (ATIVAN) 0.5 mg tablet 30 Tab 0     Sig: TAKE 1 TABLET BY MOUTH EVERY DAY     Authorizing Provider: Marielena De Oliveira

## 2018-01-16 ENCOUNTER — OFFICE VISIT (OUTPATIENT)
Dept: INTERNAL MEDICINE CLINIC | Age: 63
End: 2018-01-16

## 2018-01-16 VITALS
SYSTOLIC BLOOD PRESSURE: 122 MMHG | DIASTOLIC BLOOD PRESSURE: 82 MMHG | BODY MASS INDEX: 29.6 KG/M2 | OXYGEN SATURATION: 97 % | TEMPERATURE: 96.9 F | HEIGHT: 67 IN | HEART RATE: 87 BPM | RESPIRATION RATE: 14 BRPM | WEIGHT: 188.6 LBS

## 2018-01-16 DIAGNOSIS — R11.2 NAUSEA AND VOMITING, INTRACTABILITY OF VOMITING NOT SPECIFIED, UNSPECIFIED VOMITING TYPE: ICD-10-CM

## 2018-01-16 DIAGNOSIS — R68.89 FLU-LIKE SYMPTOMS: ICD-10-CM

## 2018-01-16 DIAGNOSIS — K52.9 GASTROENTERITIS: ICD-10-CM

## 2018-01-16 DIAGNOSIS — R05.8 DRY COUGH: Primary | ICD-10-CM

## 2018-01-16 LAB
QUICKVUE INFLUENZA TEST: NEGATIVE
VALID INTERNAL CONTROL?: YES

## 2018-01-16 NOTE — MR AVS SNAPSHOT
[facility]       [address], [address] [address]  [facility] [address] [facility]     Patient: Gomez Renteria  MRN: [id number]  GCY:[birth year]               Visit Information     Date & Time Provider Department Dept. Phone Encounter #    1/16/2018  9:45 AM ANASTASIYA Maciel [facility] [phone] [id number]      Your Appointments     2/26/2018 10:00 AM   COMPLETE 40 with MD Jc Ferreira [facility] [address] [facility])   Appt Note: cp    [address] [address] [facility]           [address] , [address]  [address] [address] Maintenance        Date Due    ZOSTER VACCINE AGE 60> 6/1/2017    BREAST CANCER SCRN MAMMOGRAM 2/24/2019    PAP AKA CERVICAL CYTOLOGY 2/24/2020    DTaP/Tdap/Td series (3 - Td) 4/16/2026      Allergies as of 1/16/2018  Review Complete On: 1/16/2018 By: Vamshi Severino NP       Severity Noted Reaction Type Reactions    Sulfa (Sulfonamide Antibiotics) Medium 02/15/2016   Side Effect Hives    1 week after starting Bactrim - Feb 2016    Clindamycin  11/10/2011    Rash    Keflex [Cephalexin]  11/10/2011    Rash    Morphine  10/12/2014    Other (comments)    Dysphoria/anxiety    Pcn [Penicillins]  10/27/2011   Side Effect Rash      Current Immunizations  Reviewed on 10/27/2011    Name Date    Influenza Vaccine 10/2/2017, 10/17/2015, 11/16/2014, 9/1/2013    Influenza Vaccine Whole 11/1/2003    TDAP Vaccine 10/27/2011 10:38 AM    Tdap 4/16/2016  6:18 PM       Not reviewed this visit   You Were Diagnosed With        Codes Comments    Dry cough    -  Primary ICD-10-CM: R05  ICD-9-CM: 786.2     Nausea and vomiting, intractability of vomiting not specified, unspecified vomiting type     ICD-10-CM: R11.2  ICD-9-CM: 787.01     Flu-like symptoms     ICD-10-CM: R68.89  ICD-9-CM: 780.99     Gastroenteritis     ICD-10-CM: K52.9  ICD-9-CM: 558. 9       Vitals     BP Pulse Temp Resp Height(growth percentile) Weight(growth percentile)    122/82 (BP 1 Location: Left arm, BP Patient Position: Sitting) 87 96.9 °F (36.1 °C) (Oral) 14 5' 7\" (1.702 m) 188 lb 9.6 oz (85.5 kg)    LMP SpO2 BMI OB Status Smoking Status       10/28/2002 (Exact Date) 97% 29.54 kg/m2 Postmenopausal Current Every Day Smoker     Vitals History      BMI and BSA Data     Body Mass Index Body Surface Area    29.54 kg/m 2 2.01 m 2         Preferred Pharmacy       Pharmacy Name Phone    [facility] [address], [address] [phone]         Your Updated Medication List          This list is accurate as of: 1/16/18 10:32 AM.  Always use your most recent med list.                B COMPLEX 1 PO   Take 1 Cap by mouth daily. cholecalciferol 1,000 unit tablet   Commonly known as:  VITAMIN D3   Take 1,000 Units by mouth daily. LORazepam 0.5 mg tablet   Commonly known as:  ATIVAN   TAKE 1 TABLET BY MOUTH EVERY DAY               We Performed the Following     AMB POC RAPID INFLUENZA TEST [89843 CPT(R)]       Patient Instructions         Gastroenteritis: Care Instructions  Your Care Instructions    Gastroenteritis is an illness that may cause nausea, vomiting, and diarrhea. It is sometimes called \"stomach flu. \" It can be caused by bacteria or a virus. You will probably begin to feel better in 1 to 2 days. In the meantime, get plenty of rest and make sure you do not become dehydrated. Dehydration occurs when your body loses too much fluid. Follow-up care is a key part of your treatment and safety. Be sure to make and go to all appointments, and call your doctor if you are having problems. It's also a good idea to know your test results and keep a list of the medicines you take. How can you care for yourself at home? · If your doctor prescribed antibiotics, take them as directed. Do not stop taking them just because you feel better.  You need to take the full course of antibiotics. · Drink plenty of fluids to prevent dehydration, enough so that your urine is light yellow or clear like water. Choose water and other caffeine-free clear liquids until you feel better. If you have kidney, heart, or liver disease and have to limit fluids, talk with your doctor before you increase your fluid intake. · Drink fluids slowly, in frequent, small amounts, because drinking too much too fast can cause vomiting. · Begin eating mild foods, such as dry toast, yogurt, applesauce, bananas, and rice. Avoid spicy, hot, or high-fat foods, and do not drink alcohol or caffeine for a day or two. Do not drink milk or eat ice cream until you are feeling better. How to prevent gastroenteritis  · Keep hot foods hot and cold foods cold. · Do not eat meats, dressings, salads, or other foods that have been kept at room temperature for more than 2 hours. · Use a thermometer to check your refrigerator. It should be between 34°F and 40°F.  · Defrost meats in the refrigerator or microwave, not on the kitchen counter. · Keep your hands and your kitchen clean. Wash your hands, cutting boards, and countertops with hot soapy water frequently. · Cook meat until it is well done. · Do not eat raw eggs or uncooked sauces made with raw eggs. · Do not take chances. If food looks or tastes spoiled, throw it out. When should you call for help? Call 911 anytime you think you may need emergency care. For example, call if:  ? · You vomit blood or what looks like coffee grounds. ? · You passed out (lost consciousness). ? · You pass maroon or very bloody stools. ?Call your doctor now or seek immediate medical care if:  ? · You have severe belly pain. ? · You have signs of needing more fluids. You have sunken eyes, a dry mouth, and pass only a little dark urine. ? · You feel like you are going to faint. ? · You have increased belly pain that does not go away in 1 to 2 days.    ? · You have new or increased nausea, or you are vomiting. ? · You have a new or higher fever. ? · Your stools are black and tarlike or have streaks of blood. ? Watch closely for changes in your health, and be sure to contact your doctor if:  ? · You are dizzy or lightheaded. ? · You urinate less than usual, or your urine is dark yellow or brown. ? · You do not feel better with each day that goes by. Where can you learn more? Go to http://melodie-ramu.info/. Enter N142 in the search box to learn more about \"Gastroenteritis: Care Instructions. \"  Current as of: March 3, 2017  Content Version: 11.4  © 5188-4070 [employer]. Care instructions adapted under license by [employer] (which disclaims liability or warranty for this information). If you have questions about a medical condition or this instruction, always ask your healthcare professional. [address] any warranty or liability for your use of this information. Introducing [facility] [place] [facility]! [facility] introduces [employer] patient portal. Now you can access parts of your medical record, email your doctor's office, and request medication refills online. 1. In your internet browser, go to [email or web address]   2. Click on the First Time User? Click Here link in the Sign In box. You will see the New Member Sign Up page. 3. Enter your [employer] Access Code exactly as it appears below. You will not need to use this code after youve completed the sign-up process. If you do not sign up before the expiration date, you must request a new code. · [employer] Access Code: [email or web address]  Expires: 2/25/2018 12:46 PM    4. Enter the last four digits of your Social Security Number (xxxx) and Date of Birth (mm/dd/yyyy) as indicated and click Submit. You will be taken to the next sign-up page. 5. Create a [employer] ID.  This will be your [employer] login ID and cannot be changed, so think of one that is secure and easy to remember. 6. Create a [employer] password. You can change your password at any time. 7. Enter your Password Reset Question and Answer. This can be used at a later time if you forget your password. 8. Enter your e-mail address. You will receive e-mail notification when new information is available in [address] 9. Click Sign Up. You can now view and download portions of your medical record. 10. Click the Download Summary menu link to download a portable copy of your medical information. If you have questions, please visit the Frequently Asked Questions section of the [employer] website. Remember, [employer] is NOT to be used for urgent needs. For medical emergencies, dial 911. Now available from your iPhone and Android! Please provide this summary of care documentation to your next provider. Your primary care clinician is listed as Milly Au. If you have any questions after today's visit, please call [phone].

## 2018-01-16 NOTE — LETTER
NOTIFICATION RETURN TO WORK / SCHOOL    1/16/2018 10:32 AM    Ms. Jone Levy  [address] [address]      To Whom It May Concern:    Jone Levy is currently under the care of Rachana Shah. She will return to work/school on: 1/17/18    If there are questions or concerns please have the patient contact our office.         Sincerely,      Candelario Madrid, NP

## 2018-01-16 NOTE — PROGRESS NOTES
HISTORY OF PRESENT ILLNESS  Iván Vega is a 63 y.o. female. Patient reports multiple episodes of vomiting food contents 2 days ago, which has resolved now. She has lingering dry cough and fatigue. She is also having upper back pain, which is believed to be from the vomiting. Pain is worse with cough or deep breath. No vomiting yesterday or today. No diarrhea. No fever. She has taken mucinex with little relief. She reports clear nasal congestion is pretty regular for her due to allergies. She has not been using antihistamine or nasal spray, just nasal saline. Visit Vitals    /82 (BP 1 Location: Left arm, BP Patient Position: Sitting)    Pulse 87    Temp 96.9 °F (36.1 °C) (Oral)    Resp 14    Ht 5' 7\" (1.702 m)    Wt 188 lb 9.6 oz (85.5 kg)    LMP 10/28/2002 (Exact Date)  Comment: age 54    SpO2 97%    BMI 29.54 kg/m2       Nausea   The history is provided by the patient. This is a new problem. The current episode started 2 days ago. The problem has been gradually improving. Treatments tried: mucinex. The treatment provided no relief. Review of Systems   Constitutional: Positive for malaise/fatigue. HENT: Positive for congestion (sinus). Respiratory: Positive for cough (dry). Gastrointestinal: Positive for nausea and vomiting. Negative for diarrhea. Musculoskeletal: Positive for back pain. Physical Exam   Constitutional: She is oriented to person, place, and time. She appears well-developed and well-nourished. HENT:   Head: Normocephalic. Right Ear: Hearing, tympanic membrane, external ear and ear canal normal.   Left Ear: Hearing, tympanic membrane, external ear and ear canal normal.   Nose: Mucosal edema present. Mouth/Throat: Uvula is midline, oropharynx is clear and moist and mucous membranes are normal.   Neck: Normal range of motion. Neck supple. Cardiovascular: Normal rate, regular rhythm and normal heart sounds.     Pulmonary/Chest: Effort normal and breath sounds normal.   Pain in back with deep breathing and coughing   Lymphadenopathy:     She has no cervical adenopathy. Neurological: She is alert and oriented to person, place, and time. Skin: Skin is warm and dry. Psychiatric: She has a normal mood and affect. Results for orders placed or performed in visit on 01/16/18   AMB POC RAPID INFLUENZA TEST   Result Value Ref Range    VALID INTERNAL CONTROL POC Yes     QuickVue Influenza test Negative Negative     ASSESSMENT and PLAN    ICD-10-CM ICD-9-CM    1. Dry cough R05 786.2    2. Nausea and vomiting, intractability of vomiting not specified, unspecified vomiting type R11.2 787.01    3. Flu-like symptoms R68.89 780.99 AMB POC RAPID INFLUENZA TEST   4.  Gastroenteritis K52.9 558.9      Orders Placed This Encounter    AMB POC RAPID INFLUENZA TEST   hydrate  BRAT diet x 3 days  Aleve for body aches  Rest  Follow-up if symptoms worsen or fever develops

## 2018-01-16 NOTE — PATIENT INSTRUCTIONS
Gastroenteritis: Care Instructions  Your Care Instructions    Gastroenteritis is an illness that may cause nausea, vomiting, and diarrhea. It is sometimes called \"stomach flu. \" It can be caused by bacteria or a virus. You will probably begin to feel better in 1 to 2 days. In the meantime, get plenty of rest and make sure you do not become dehydrated. Dehydration occurs when your body loses too much fluid. Follow-up care is a key part of your treatment and safety. Be sure to make and go to all appointments, and call your doctor if you are having problems. It's also a good idea to know your test results and keep a list of the medicines you take. How can you care for yourself at home? · If your doctor prescribed antibiotics, take them as directed. Do not stop taking them just because you feel better. You need to take the full course of antibiotics. · Drink plenty of fluids to prevent dehydration, enough so that your urine is light yellow or clear like water. Choose water and other caffeine-free clear liquids until you feel better. If you have kidney, heart, or liver disease and have to limit fluids, talk with your doctor before you increase your fluid intake. · Drink fluids slowly, in frequent, small amounts, because drinking too much too fast can cause vomiting. · Begin eating mild foods, such as dry toast, yogurt, applesauce, bananas, and rice. Avoid spicy, hot, or high-fat foods, and do not drink alcohol or caffeine for a day or two. Do not drink milk or eat ice cream until you are feeling better. How to prevent gastroenteritis  · Keep hot foods hot and cold foods cold. · Do not eat meats, dressings, salads, or other foods that have been kept at room temperature for more than 2 hours. · Use a thermometer to check your refrigerator. It should be between 34°F and 40°F.  · Defrost meats in the refrigerator or microwave, not on the kitchen counter. · Keep your hands and your kitchen clean.  Wash your hands, cutting boards, and countertops with hot soapy water frequently. · Cook meat until it is well done. · Do not eat raw eggs or uncooked sauces made with raw eggs. · Do not take chances. If food looks or tastes spoiled, throw it out. When should you call for help? Call 911 anytime you think you may need emergency care. For example, call if:  ? · You vomit blood or what looks like coffee grounds. ? · You passed out (lost consciousness). ? · You pass maroon or very bloody stools. ?Call your doctor now or seek immediate medical care if:  ? · You have severe belly pain. ? · You have signs of needing more fluids. You have sunken eyes, a dry mouth, and pass only a little dark urine. ? · You feel like you are going to faint. ? · You have increased belly pain that does not go away in 1 to 2 days. ? · You have new or increased nausea, or you are vomiting. ? · You have a new or higher fever. ? · Your stools are black and tarlike or have streaks of blood. ? Watch closely for changes in your health, and be sure to contact your doctor if:  ? · You are dizzy or lightheaded. ? · You urinate less than usual, or your urine is dark yellow or brown. ? · You do not feel better with each day that goes by. Where can you learn more? Go to http://melodie-ramu.info/. Enter N142 in the search box to learn more about \"Gastroenteritis: Care Instructions. \"  Current as of: March 3, 2017  Content Version: 11.4  © 6730-3711 [employer]. Care instructions adapted under license by [employer] (which disclaims liability or warranty for this information). If you have questions about a medical condition or this instruction, always ask your healthcare professional. [address] any warranty or liability for your use of this information.

## 2018-04-24 DIAGNOSIS — F41.9 ANXIETY: ICD-10-CM

## 2018-04-24 RX ORDER — LORAZEPAM 0.5 MG/1
0.5 TABLET ORAL
Qty: 30 TAB | Refills: 0 | OUTPATIENT
Start: 2018-04-24 | End: 2018-08-17 | Stop reason: SDUPTHER

## 2018-04-24 NOTE — TELEPHONE ENCOUNTER
Amber Guardado MD   You 3 hours ago (11:08 AM)                Please call script in to pharmacy - patient due for follow up for further refills         Call to pharmacy listed, left voicemail on pharmacist line with Rx information and prescription called in successfully. Requested Prescriptions     Signed Prescriptions Disp Refills    LORazepam (ATIVAN) 0.5 mg tablet 30 Tab 0     Sig: Take 1 Tab by mouth daily as needed for Anxiety.  Need office visit for further refills     Authorizing Provider: Lex Gavin

## 2018-04-24 NOTE — TELEPHONE ENCOUNTER
Orders Placed This Encounter    LORazepam (ATIVAN) 0.5 mg tablet     Sig: Take 1 Tab by mouth daily as needed for Anxiety.  Need office visit for further refills     Dispense:  30 Tab     Refill:  0     Not to exceed 5 additional fills before 07/10/2018       reviewed 4/24/2018

## 2018-05-23 ENCOUNTER — OFFICE VISIT (OUTPATIENT)
Dept: INTERNAL MEDICINE CLINIC | Age: 63
End: 2018-05-23

## 2018-05-23 VITALS
TEMPERATURE: 98.3 F | HEART RATE: 80 BPM | SYSTOLIC BLOOD PRESSURE: 160 MMHG | BODY MASS INDEX: 29.51 KG/M2 | OXYGEN SATURATION: 98 % | RESPIRATION RATE: 16 BRPM | DIASTOLIC BLOOD PRESSURE: 80 MMHG | HEIGHT: 67 IN | WEIGHT: 188 LBS

## 2018-05-23 DIAGNOSIS — M54.40 ACUTE BILATERAL LOW BACK PAIN WITH SCIATICA, SCIATICA LATERALITY UNSPECIFIED: Primary | ICD-10-CM

## 2018-05-23 RX ORDER — CYCLOBENZAPRINE HCL 5 MG
5 TABLET ORAL
Qty: 15 TAB | Refills: 0 | Status: SHIPPED | OUTPATIENT
Start: 2018-05-23 | End: 2018-05-28

## 2018-05-23 RX ORDER — NAPROXEN 500 MG/1
500 TABLET ORAL 2 TIMES DAILY WITH MEALS
Qty: 14 TAB | Refills: 0 | Status: SHIPPED | OUTPATIENT
Start: 2018-05-23 | End: 2018-05-30

## 2018-05-23 NOTE — PROGRESS NOTES
Chief Complaint   Patient presents with    Back Pain     over 2 wks tingling    Follow-up     patient First May 13, 2018      Reviewed record in preparation for visit and have obtained necessary documentation. Identified pt with two pt identifiers(name and ). Health Maintenance Due   Topic    ZOSTER VACCINE AGE 60>          Chief Complaint   Patient presents with    Back Pain     over 2 wks tingling    Follow-up     patient First May 13, 2018         Wt Readings from Last 3 Encounters:   18 188 lb (85.3 kg)   18 188 lb 9.6 oz (85.5 kg)   17 191 lb (86.6 kg)     Temp Readings from Last 3 Encounters:   18 98.3 °F (36.8 °C) (Oral)   18 96.9 °F (36.1 °C) (Oral)   17 98 °F (36.7 °C) (Oral)     BP Readings from Last 3 Encounters:   18 160/80   18 122/82   17 114/66     Pulse Readings from Last 3 Encounters:   18 80   18 87   17 91           Learning Assessment:  :     Learning Assessment 5/15/2015 3/25/2014   PRIMARY LEARNER Patient Patient   HIGHEST LEVEL OF EDUCATION - PRIMARY LEARNER  > 4 YEARS OF COLLEGE 4 YEARS OF COLLEGE   BARRIERS PRIMARY LEARNER NONE NONE   CO-LEARNER CAREGIVER No No   PRIMARY LANGUAGE ENGLISH ENGLISH    NEED No No   LEARNER PREFERENCE PRIMARY DEMONSTRATION DEMONSTRATION     PICTURES READING     LISTENING LISTENING   LEARNING SPECIAL TOPICS no no   ANSWERED BY patient patient   RELATIONSHIP SELF SELF       Depression Screening:  :     PHQ over the last two weeks 2016   Little interest or pleasure in doing things Not at all   Feeling down, depressed or hopeless Not at all   Total Score PHQ 2 0       Fall Risk Assessment:  :     Fall Risk Assessment, last 12 mths 2017   Able to walk? Yes   Fall in past 12 months? No       Abuse Screening:  :     Abuse Screening Questionnaire 5/15/2015 3/25/2014   Do you ever feel afraid of your partner?  N N   Are you in a relationship with someone who physically or mentally threatens you? N N   Is it safe for you to go home? Y Y       Coordination of Care Questionnaire:  :     1) Have you been to an emergency room, urgent care clinic since your last visit? yes  Hospitalized since your last visit? no             2) Have you seen or consulted any other health care providers outside of [address] [place] [address] since your last visit? yes  (Include any pap smears or colon screenings in this section.)    3) Do you have an Advance Directive on file? no    4) Are you interested in receiving information on Advance Directives? NO      Patient is accompanied by self I have received verbal consent from Pardeep Franco to discuss any/all medical information while they are present in the room. Reviewed record  In preparation for visit and have obtained necessary documentation.

## 2018-05-23 NOTE — PROGRESS NOTES
HISTORY OF PRESENT ILLNESS  Juan Salmeron is a 63 y.o. female. Patient reports low back pain for 3 weeks since changing the water in a water cooler. She went to [facility] about 2 weeks ago and took a Medrol dose pack, which helped while she was taking it. The pain returned after she stopped the steroids. NSAIDS do help, but she doesn't take them regularly. She reports spasms at times. She feels improvement with walking and worse when sitting. Steady gait. No change in bowel or bladder control. Visit Vitals    /80 (BP 1 Location: Left arm, BP Patient Position: Sitting)    Pulse 80    Temp 98.3 °F (36.8 °C) (Oral)    Resp 16    Ht 5' 7\" (1.702 m)    Wt 188 lb (85.3 kg)    LMP 10/28/2002 (Exact Date)  Comment: age 54    SpO2 98%    BMI 29.44 kg/m2       Back Pain    The history is provided by the patient. This is a new problem. Episode onset: 3 weeks ago. The problem has been gradually improving. The pain is present in the lower back. The pain radiates to the right thigh. Follow-up         Review of Systems   Musculoskeletal: Positive for back pain. Physical Exam   Constitutional: She appears well-developed and well-nourished. Musculoskeletal:        Lumbar back: She exhibits tenderness (left and right paraspinal tenderness with range of motion; spasms noted which cause pain to radiate down posterior right thigh), pain and spasm. She exhibits normal range of motion, no bony tenderness and no swelling. Skin: Skin is warm and dry. Psychiatric: She has a normal mood and affect. ASSESSMENT and PLAN    ICD-10-CM ICD-9-CM    1.  Acute bilateral low back pain with sciatica, sciatica laterality unspecified M54.40 724.2 REFERRAL TO PHYSICAL THERAPY     724.3      Orders Placed This Encounter    REFERRAL TO PHYSICAL THERAPY    cyclobenzaprine (FLEXERIL) 5 mg tablet    naproxen (NAPROSYN) 500 mg tablet   naproxen BID for 5-7 days-take with food  Flexeril at night  PT  Consider xray if PT requests or if no improvement

## 2018-05-23 NOTE — PATIENT INSTRUCTIONS

## 2018-05-23 NOTE — MR AVS SNAPSHOT
[address] , [address] [address]  [address]     Patient: Ramesh Singer  MRN: [id number]  JVC:[birth year]               Visit Information     Date & Time Provider Department Dept.  Phone Encounter #    5/23/2018  2:45 PM ANASTASIYA [facility] [phone] [id number]      Upcoming Health Maintenance        Date Due    ZOSTER VACCINE AGE 60> 6/1/2017    Influenza Age 5 to Adult 8/1/2018    BREAST CANCER SCRN MAMMOGRAM 2/24/2019    PAP AKA CERVICAL CYTOLOGY 2/24/2020    DTaP/Tdap/Td series (3 - Td) 4/16/2026      Allergies as of 5/23/2018  Review Complete On: 5/23/2018 By: Claudine Estes LPN       Severity Noted Reaction Type Reactions    Sulfa (Sulfonamide Antibiotics) Medium 02/15/2016   Side Effect Hives    1 week after starting Bactrim - Feb 2016    Clindamycin  11/10/2011    Rash    Keflex [Cephalexin]  11/10/2011    Rash    Morphine  10/12/2014    Other (comments)    Dysphoria/anxiety    Pcn [Penicillins]  10/27/2011   Side Effect Rash      Current Immunizations  Reviewed on 2/26/2018    Name Date    Influenza Vaccine 10/2/2017, 10/17/2015, 11/16/2014, 9/1/2013    Influenza Vaccine Whole 11/1/2003    TDAP Vaccine 10/27/2011 10:38 AM    Tdap 4/16/2016  6:18 PM       Not reviewed this visit   You Were Diagnosed With        Codes Comments    Acute bilateral low back pain with sciatica, sciatica laterality unspecified    -  Primary ICD-10-CM: M54.40  ICD-9-CM: 724.2, 724.3       Vitals     BP Pulse Temp Resp Height(growth percentile) Weight(growth percentile)    160/80 (BP 1 Location: Left arm, BP Patient Position: Sitting) 80 98.3 °F (36.8 °C) (Oral) 16 5' 7\" (1.702 m) 188 lb (85.3 kg)    LMP SpO2 BMI OB Status Smoking Status       10/28/2002 (Exact Date) 98% 29.44 kg/m2 Postmenopausal Current Every Day Smoker     Vitals History      BMI and BSA Data     Body Mass Index Body Surface Area    29.44 kg/m 2 2.01 m 2         Preferred Pharmacy       Pharmacy Name Phone    [facility] [address] [address], [address] [phone]         Your Updated Medication List          This list is accurate as of 5/23/18  3:13 PM.  Always use your most recent med list.                B COMPLEX 1 PO   Take 1 Cap by mouth daily. cholecalciferol 1,000 unit tablet   Commonly known as:  VITAMIN D3   Take 1,000 Units by mouth daily. cyclobenzaprine 5 mg tablet   Commonly known as:  FLEXERIL   Take 1 Tab by mouth three (3) times daily as needed for Muscle Spasm(s) for up to 5 days. LORazepam 0.5 mg tablet   Commonly known as:  ATIVAN   Take 1 Tab by mouth daily as needed for Anxiety. Need office visit for further refills       naproxen 500 mg tablet   Commonly known as:  NAPROSYN   Take 1 Tab by mouth two (2) times daily (with meals) for 7 days. Prescriptions Sent to Pharmacy        Refills    cyclobenzaprine (FLEXERIL) 5 mg tablet 0    Sig: Take 1 Tab by mouth three (3) times daily as needed for Muscle Spasm(s) for up to 5 days. Class: Normal    Pharmacy: [id number] Ph #: [phone]    Route: Oral    naproxen (NAPROSYN) 500 mg tablet 0    Sig: Take 1 Tab by mouth two (2) times daily (with meals) for 7 days. Class: Normal    Pharmacy: [address], [address] Ph #: [phone]    Route: Oral      We Performed the Following     REFERRAL TO PHYSICAL THERAPY [AVK04 Custom]     Comments:    Please evaluate patient for low back pain.       Referral Information     Referral ID Referred By Referred To       [id number] Federica ESQUIVEL, PT, DPT       [address] [place] [address] [address]       Phone: [phone]       Fax: [phone]         Visits Status Start Date End Date    1 New Request 5/23/18 5/23/19    If your referral has a status of pending review or denied, additional information will be sent to support the outcome of this decision. Patient Instructions         Low Back Pain: Exercises  Your Care Instructions  Here are some examples of typical rehabilitation exercises for your condition. Start each exercise slowly. Ease off the exercise if you start to have pain. Your doctor or physical therapist will tell you when you can start these exercises and which ones will work best for you. How to do the exercises  Press-up    1. Lie on your stomach, supporting your body with your forearms. 2. Press your elbows down into the floor to raise your upper back. As you do this, relax your stomach muscles and allow your back to arch without using your back muscles. As your press up, do not let your hips or pelvis come off the floor. 3. Hold for 15 to 30 seconds, then relax. 4. Repeat 2 to 4 times. Alternate arm and leg (bird dog) exercise    Do this exercise slowly. Try to keep your body straight at all times, and do not let one hip drop lower than the other. 1. Start on the floor, on your hands and knees. 2. Tighten your belly muscles. 3. Raise one leg off the floor, and hold it straight out behind you. Be careful not to let your hip drop down, because that will twist your trunk. 4. Hold for about 6 seconds, then lower your leg and switch to the other leg. 5. Repeat 8 to 12 times on each leg. 6. Over time, work up to holding for 10 to 30 seconds each time. 7. If you feel stable and secure with your leg raised, try raising the opposite arm straight out in front of you at the same time. Knee-to-chest exercise    1. Lie on your back with your knees bent and your feet flat on the floor. 2. Bring one knee to your chest, keeping the other foot flat on the floor (or keeping the other leg straight, whichever feels better on your lower back). 3. Keep your lower back pressed to the floor.  Hold for at least 15 to 30 seconds. 4. Relax, and lower the knee to the starting position. 5. Repeat with the other leg. Repeat 2 to 4 times with each leg. 6. To get more stretch, put your other leg flat on the floor while pulling your knee to your chest.  Curl-ups    1. Lie on the floor on your back with your knees bent at a 90-degree angle. Your feet should be flat on the floor, about 12 inches from your buttocks. 2. Cross your arms over your chest. If this bothers your neck, try putting your hands behind your neck (not your head), with your elbows spread apart. 3. Slowly tighten your belly muscles and raise your shoulder blades off the floor. 4. Keep your head in line with your body, and do not press your chin to your chest.  5. Hold this position for 1 or 2 seconds, then slowly lower yourself back down to the floor. 6. Repeat 8 to 12 times. Pelvic tilt exercise    1. Lie on your back with your knees bent. 2. \"Brace\" your stomach. This means to tighten your muscles by pulling in and imagining your belly button moving toward your spine. You should feel like your back is pressing to the floor and your hips and pelvis are rocking back. 3. Hold for about 6 seconds while you breathe smoothly. 4. Repeat 8 to 12 times. Heel dig bridging    1. Lie on your back with both knees bent and your ankles bent so that only your heels are digging into the floor. Your knees should be bent about 90 degrees. 2. Then push your heels into the floor, squeeze your buttocks, and lift your hips off the floor until your shoulders, hips, and knees are all in a straight line. 3. Hold for about 6 seconds as you continue to breathe normally, and then slowly lower your hips back down to the floor and rest for up to 10 seconds. 4. Do 8 to 12 repetitions. Hamstring stretch in doorway    1. Lie on your back in a doorway, with one leg through the open door. 2. Slide your leg up the wall to straighten your knee.  You should feel a gentle stretch down the back of your leg. 3. Hold the stretch for at least 15 to 30 seconds. Do not arch your back, point your toes, or bend either knee. Keep one heel touching the floor and the other heel touching the wall. 4. Repeat with your other leg. 5. Do 2 to 4 times for each leg. Hip flexor stretch    1. Kneel on the floor with one knee bent and one leg behind you. Place your forward knee over your foot. Keep your other knee touching the floor. 2. Slowly push your hips forward until you feel a stretch in the upper thigh of your rear leg. 3. Hold the stretch for at least 15 to 30 seconds. Repeat with your other leg. 4. Do 2 to 4 times on each side. Wall sit    1. Stand with your back 10 to 12 inches away from a wall. 2. Lean into the wall until your back is flat against it. 3. Slowly slide down until your knees are slightly bent, pressing your lower back into the wall. 4. Hold for about 6 seconds, then slide back up the wall. 5. Repeat 8 to 12 times. Follow-up care is a key part of your treatment and safety. Be sure to make and go to all appointments, and call your doctor if you are having problems. It's also a good idea to know your test results and keep a list of the medicines you take. Where can you learn more? Go to http://melodie-ramu.info/. Enter Y786 in the search box to learn more about \"Low Back Pain: Exercises. \"  Current as of: March 21, 2017  Content Version: 11.4  © 0208-9755 [employer]. Care instructions adapted under license by [employer] (which disclaims liability or warranty for this information). If you have questions about a medical condition or this instruction, always ask your healthcare professional. [place] [address] any warranty or liability for your use of this information. Introducing [facility]!      Shira Marquez introduces [employer] patient portal. Now you can access parts of your medical record, email your doctor's office, and request medication refills online. 1. In your internet browser, go to [email or web address]   2. Click on the First Time User? Click Here link in the Sign In box. You will see the New Member Sign Up page. 3. Enter your [employer] Access Code exactly as it appears below. You will not need to use this code after youve completed the sign-up process. If you do not sign up before the expiration date, you must request a new code. · [employer] Access Code: [email or web address]  Expires: 8/21/2018  3:08 PM    4. Enter the last four digits of your Social Security Number (xxxx) and Date of Birth (mm/dd/yyyy) as indicated and click Submit. You will be taken to the next sign-up page. 5. Create a [employer] ID. This will be your [employer] login ID and cannot be changed, so think of one that is secure and easy to remember. 6. Create a [employer] password. You can change your password at any time. 7. Enter your Password Reset Question and Answer. This can be used at a later time if you forget your password. 8. Enter your e-mail address. You will receive e-mail notification when new information is available in [address] 9. Click Sign Up. You can now view and download portions of your medical record. 10. Click the Download Summary menu link to download a portable copy of your medical information. If you have questions, please visit the Frequently Asked Questions section of the [employer] website. Remember, [employer] is NOT to be used for urgent needs. For medical emergencies, dial 911. Now available from your iPhone and Android! Please provide this summary of care documentation to your next provider. Your primary care clinician is listed as Milly Au. If you have any questions after today's visit, please call [phone].

## 2018-05-29 ENCOUNTER — OFFICE VISIT (OUTPATIENT)
Dept: INTERNAL MEDICINE CLINIC | Age: 63
End: 2018-05-29

## 2018-05-29 VITALS
HEIGHT: 67 IN | WEIGHT: 187.3 LBS | SYSTOLIC BLOOD PRESSURE: 140 MMHG | RESPIRATION RATE: 16 BRPM | TEMPERATURE: 98.9 F | HEART RATE: 88 BPM | DIASTOLIC BLOOD PRESSURE: 84 MMHG | OXYGEN SATURATION: 98 % | BODY MASS INDEX: 29.4 KG/M2

## 2018-05-29 DIAGNOSIS — J34.3 HYPERTROPHY OF NASAL TURBINATES: ICD-10-CM

## 2018-05-29 DIAGNOSIS — J40 BRONCHITIS: Primary | ICD-10-CM

## 2018-05-29 DIAGNOSIS — J30.9 ALLERGIC RHINITIS, UNSPECIFIED SEASONALITY, UNSPECIFIED TRIGGER: ICD-10-CM

## 2018-05-29 RX ORDER — AZELASTINE 1 MG/ML
2 SPRAY, METERED NASAL
Qty: 1 BOTTLE | Refills: 1 | Status: SHIPPED | OUTPATIENT
Start: 2018-05-29 | End: 2018-10-23

## 2018-05-29 RX ORDER — METHYLPREDNISOLONE 4 MG/1
TABLET ORAL
Qty: 1 DOSE PACK | Refills: 0 | Status: SHIPPED | OUTPATIENT
Start: 2018-05-29 | End: 2018-08-15 | Stop reason: ALTCHOICE

## 2018-05-29 NOTE — PROGRESS NOTES
Chief Complaint   Patient presents with    Croup    Cough     Reviewed record in preparation for visit and have obtained necessary documentation. Identified pt with two pt identifiers(name and ). Health Maintenance Due   Topic    ZOSTER VACCINE AGE 60>          Chief Complaint   Patient presents with    Croup    Cough        Wt Readings from Last 3 Encounters:   18 187 lb 4.8 oz (85 kg)   18 188 lb (85.3 kg)   18 188 lb 9.6 oz (85.5 kg)     Temp Readings from Last 3 Encounters:   18 98.9 °F (37.2 °C) (Oral)   18 98.3 °F (36.8 °C) (Oral)   18 96.9 °F (36.1 °C) (Oral)     BP Readings from Last 3 Encounters:   18 140/84   18 160/80   18 122/82     Pulse Readings from Last 3 Encounters:   18 88   18 80   18 87           Learning Assessment:  :     Learning Assessment 5/15/2015 3/25/2014   PRIMARY LEARNER Patient Patient   HIGHEST LEVEL OF EDUCATION - PRIMARY LEARNER  > 4 YEARS OF COLLEGE 4 YEARS OF COLLEGE   BARRIERS PRIMARY LEARNER NONE NONE   CO-LEARNER CAREGIVER No No   PRIMARY LANGUAGE ENGLISH ENGLISH    NEED No No   LEARNER PREFERENCE PRIMARY DEMONSTRATION DEMONSTRATION     PICTURES READING     LISTENING LISTENING   LEARNING SPECIAL TOPICS no no   ANSWERED BY patient patient   RELATIONSHIP SELF SELF       Depression Screening:  :     PHQ over the last two weeks 2016   Little interest or pleasure in doing things Not at all   Feeling down, depressed or hopeless Not at all   Total Score PHQ 2 0       Fall Risk Assessment:  :     Fall Risk Assessment, last 12 mths 2017   Able to walk? Yes   Fall in past 12 months? No       Abuse Screening:  :     Abuse Screening Questionnaire 5/15/2015 3/25/2014   Do you ever feel afraid of your partner? N N   Are you in a relationship with someone who physically or mentally threatens you? N N   Is it safe for you to go home?  [place]       Coordination of Care Questionnaire:  : 1) Have you been to an emergency room, urgent care clinic since your last visit? yes   Hospitalized since your last visit? no             2) Have you seen or consulted any other health care providers outside of [facility] since your last visit? yes  (Include any pap smears or colon screenings in this section.)    3) Do you have an Advance Directive on file? no    4) Are you interested in receiving information on Advance Directives? NO      Patient is accompanied by self I have received verbal consent from Josefa Rosas to discuss any/all medical information while they are present in the room. Reviewed record  In preparation for visit and have obtained necessary documentation.

## 2018-05-29 NOTE — PROGRESS NOTES
HISTORY OF PRESENT ILLNESS  Agnes Velasquez is a 63 y.o. female. Patient reports worsening nasal congestion and cough. Congestion has been clear to cloudy. Cough has been productive of yellow sputum at times. Nasal passages feel occluded. No fever. She went to [facility] a few days ago and was placed on Levaquin. She reports no improvement with the antibiotic. She has also been using flonase and sudafed with no relief. Visit Vitals    /84 (BP 1 Location: Left arm, BP Patient Position: Sitting)    Pulse 88    Temp 98.9 °F (37.2 °C) (Oral)    Resp 16    Ht 5' 7\" (1.702 m)    Wt 187 lb 4.8 oz (85 kg)    LMP 10/28/2002 (Exact Date)  Comment: age 54    SpO2 98%    BMI 29.34 kg/m2       Cough   The history is provided by the patient. This is a new problem. Episode onset: 6 days. The problem has been gradually worsening. Treatments tried: levaquin, flonase. The treatment provided no relief. Review of Systems   Constitutional: Positive for malaise/fatigue. HENT: Positive for congestion (clear to cloudy). Respiratory: Positive for cough. Physical Exam   Constitutional: She is oriented to person, place, and time. She appears well-developed and well-nourished. HENT:   Head: Normocephalic. Right Ear: Hearing, tympanic membrane, external ear and ear canal normal.   Left Ear: Hearing, tympanic membrane, external ear and ear canal normal.   Nose: Mucosal edema (severe, erythematous, clear drainage) present. Mouth/Throat: Uvula is midline and mucous membranes are normal. Posterior oropharyngeal erythema present. No oropharyngeal exudate or posterior oropharyngeal edema. Neck: Normal range of motion. Neck supple. Cardiovascular: Normal rate, regular rhythm and normal heart sounds. Pulmonary/Chest: Effort normal. She has wheezes (scattered throughout). Neurological: She is alert and oriented to person, place, and time. Skin: Skin is warm and dry.    Psychiatric: She has a normal mood and affect. ASSESSMENT and PLAN    ICD-10-CM ICD-9-CM    1. Bronchitis J40 490    2. Allergic rhinitis, unspecified seasonality, unspecified trigger J30.9 477.9    3.  Hypertrophy of nasal turbinates J34.3 478.0      Orders Placed This Encounter    methylPREDNISolone (MEDROL DOSEPACK) 4 mg tablet    azelastine (ASTELIN) 137 mcg (0.1 %) nasal spray   zyrtec and flonase daily  Advised this seems allergy-triggered, not bacterial at this time

## 2018-08-15 ENCOUNTER — OFFICE VISIT (OUTPATIENT)
Dept: INTERNAL MEDICINE CLINIC | Age: 63
End: 2018-08-15

## 2018-08-15 ENCOUNTER — HOSPITAL ENCOUNTER (OUTPATIENT)
Dept: MAMMOGRAPHY | Age: 63
Discharge: HOME | End: 2018-08-15
Attending: INTERNAL MEDICINE
Payer: COMMERCIAL

## 2018-08-15 VITALS
RESPIRATION RATE: 16 BRPM | HEIGHT: 67 IN | DIASTOLIC BLOOD PRESSURE: 90 MMHG | OXYGEN SATURATION: 97 % | BODY MASS INDEX: 29.51 KG/M2 | SYSTOLIC BLOOD PRESSURE: 130 MMHG | WEIGHT: 188 LBS | TEMPERATURE: 97.9 F | HEART RATE: 87 BPM

## 2018-08-15 DIAGNOSIS — Z12.31 VISIT FOR SCREENING MAMMOGRAM: ICD-10-CM

## 2018-08-15 DIAGNOSIS — Z12.39 BREAST CANCER SCREENING: ICD-10-CM

## 2018-08-15 DIAGNOSIS — R10.32 LEFT LOWER QUADRANT PAIN: Primary | ICD-10-CM

## 2018-08-15 LAB
BILIRUB UR QL STRIP: NEGATIVE
GLUCOSE UR-MCNC: NEGATIVE MG/DL
KETONES P FAST UR STRIP-MCNC: NEGATIVE MG/DL
PH UR STRIP: 7 [PH] (ref 4.6–8)
PROT UR QL STRIP: NEGATIVE
SP GR UR STRIP: 1.01 (ref 1–1.03)
UA UROBILINOGEN AMB POC: NORMAL (ref 0.2–1)
URINALYSIS CLARITY POC: CLEAR
URINALYSIS COLOR POC: NORMAL
URINE BLOOD POC: NORMAL
URINE LEUKOCYTES POC: NORMAL
URINE NITRITES POC: NEGATIVE

## 2018-08-15 PROCEDURE — 77063 BREAST TOMOSYNTHESIS BI: CPT

## 2018-08-15 NOTE — PROGRESS NOTES
Subjective:      Yong Morales is a 66 y.o. female who presents today with noting a mass in her left lower abdomen while showering a couple days ago. No bowel changes. She can only feel it when she is standing up right,  Cannot feel the mass lying down. Tender to palpation when she feels it. 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  VITAMIN B COMPLEX (B COMPLEX 1 PO) Take 1 Cap by mouth daily.  azelastine (ASTELIN) 137 mcg (0.1 %) nasal spray 2 Sprays by Both Nostrils route once over twenty-four (24) hours. Use in each nostril as directed 1 Bottle 1    LORazepam (ATIVAN) 0.5 mg tablet Take 1 Tab by mouth daily as needed for Anxiety. Need office visit for further refills 30 Tab 0        Review of Systems    Pertinent items are noted in HPI. Objective:     Visit Vitals    /90 (BP 1 Location: Left arm, BP Patient Position: Sitting)    Pulse 87    Temp 97.9 °F (36.6 °C) (Oral)    Resp 16    Ht 5' 7\" (1.702 m)    Wt 188 lb (85.3 kg)    LMP 10/28/2002 (Exact Date)  Comment: age 54    SpO2 97%    BMI 29.44 kg/m2     General appearance: alert, cooperative, no distress, appears stated age  Head: Normocephalic, without obvious abnormality, atraumatic  Abdomen: no masses or tenderness noted on exam when patient is lying down. Normal bowel sounds. There is a tender spot deep to palpation in her left lower abdomen.  No distinct mass palpated, just a slight thickness in the area of the pain. Only noted when patient is standing. Assessment/Plan:       ICD-10-CM ICD-9-CM    1. Left lower quadrant pain R10.32 789.04 AMB POC URINALYSIS DIP STICK AUTO W/O MICRO      CULTURE, URINE      CT PELV W WO CONT      CANCELED: [facility] MAMMO BI SCREENING INCL CAD   2. Breast cancer screening Z12.31 V76.10 [facility] MAMMO BI SCREENING INCL CAD      Plan:  -suspect there may be a ventral hernia in the region of the pain. Will get CT for further evaluation. -patient also due for her screening mammogram and she requested an order for this test.     Orders Placed This Encounter    CULTURE, URINE    CT PELV W WO CONT     Standing Status:   Future     Standing Expiration Date:   9/15/2019     Order Specific Question:   Is Patient Allergic to Contrast Dye? Answer:   No     Order Specific Question:   STAT Creatinine as indicated     Answer:   Yes     Order Specific Question: This order utilizes IV contrast.  What additional contrast is needed? Answer:   Per Radiologist Protocol    [facility] MAMMO BI SCREENING INCL CAD     Standing Status:   Future     Standing Expiration Date:   2/13/2019     Order Specific Question:   Reason for Exam     Answer:   breast cancer screening    AMB POC URINALYSIS DIP STICK AUTO W/O MICRO        Follow-up Disposition:     Return if symptoms worsen or fail to improve. Advised patient to call back or return to office if symptoms worsen/change/persist.     Discussed expected course/resolution/complications of diagnosis in detail with patient. Medication risks/benefits/costs/interactions/alternatives discussed with patient. Patient was given an after visit summary which includes diagnoses, current medications, & vitals. Patient expressed understanding with the diagnosis and plan.

## 2018-08-15 NOTE — PROGRESS NOTES
Identified pt with two pt identifiers(name and ). Reviewed record in preparation for visit and have obtained necessary documentation. Chief Complaint   Patient presents with    Abdominal Pain     L side; \"pressure\" like bladder is full; pt also stated she feels what feels like a nodule, as long as her index finger- which she noticed beginning of 2018        Health Maintenance Due   Topic    ZOSTER VACCINE AGE 60>        Coordination of Care Questionnaire:  :   1) Have you been to an emergency room, urgent care clinic since your last visit? no   Hospitalized since your last visit? no             2. Have seen or consulted any other health care provider since your last visit? NO  If yes, where when, and reason for visit? 3) Do you have an Advanced Directive/ Living Will in place? NO  If yes, do we have a copy on file NO  If no, would you like information NO    Patient is accompanied by self I have received verbal consent from Jose L Guerra to discuss any/all medical information while they are present in the room.     Results for orders placed or performed in visit on 08/15/18   AMB POC URINALYSIS DIP STICK AUTO W/O MICRO   Result Value Ref Range    Color (UA POC) Light Yellow     Clarity (UA POC) Clear     Glucose (UA POC) Negative Negative    Bilirubin (UA POC) Negative Negative    Ketones (UA POC) Negative Negative    Specific gravity (UA POC) 1.010 1.001 - 1.035    Blood (UA POC) Trace Negative    pH (UA POC) 7.0 4.6 - 8.0    Protein (UA POC) Negative Negative    Urobilinogen (UA POC) 0.2 mg/dL 0.2 - 1    Nitrites (UA POC) Negative Negative    Leukocyte esterase (UA POC) Trace Negative

## 2018-08-15 NOTE — MR AVS SNAPSHOT
[address] , [address] [place] [address]     Patient: Julee Dill  MRN: [id number]  VOP:4/3/6848               Visit Information     Date & Time Provider Department Dept. Phone Encounter #    8/15/2018 11:40 AM Annie Duran MD [place] [id number] [facility] [phone] [id number]      Follow-up Instructions     Return if symptoms worsen or fail to improve. Upcoming Health Maintenance        Date Due    ZOSTER VACCINE AGE 60> 6/1/2017    Influenza Age 5 to Adult 10/1/2018*    BREAST CANCER SCRN MAMMOGRAM 2/24/2019    PAP AKA CERVICAL CYTOLOGY 2/24/2020    DTaP/Tdap/Td series (3 - Td) 4/16/2026    *Topic was postponed. The date shown is not the original due date.        Allergies as of 8/15/2018  Review Complete On: 8/15/2018 By: Annie Duran MD       Severity Noted Reaction Type Reactions    Sulfa (Sulfonamide Antibiotics) Medium 02/15/2016   Side Effect Hives    1 week after starting Bactrim - Feb 2016    Clindamycin  11/10/2011    Rash    Keflex [Cephalexin]  11/10/2011    Rash    Morphine  10/12/2014    Other (comments)    Dysphoria/anxiety    Pcn [Penicillins]  10/27/2011   Side Effect Rash      Current Immunizations  Reviewed on 2/26/2018    Name Date    Influenza Vaccine 10/2/2017, 10/17/2015, 11/16/2014, 9/1/2013    Influenza Vaccine Whole 11/1/2003    TDAP Vaccine 10/27/2011 10:38 AM    Tdap 4/16/2016  6:18 PM       Not reviewed this visit   You Were Diagnosed With        Codes Comments    Left lower quadrant pain    -  Primary ICD-10-CM: R10.32  ICD-9-CM: 789.04     Breast cancer screening     ICD-10-CM: Z12.31  ICD-9-CM: V76.10       Vitals     BP Pulse Temp Resp Height(growth percentile) Weight(growth percentile)    130/90 (BP 1 Location: Left arm, BP Patient Position: Sitting) 87 97.9 °F (36.6 °C) (Oral) 16 5' 7\" (1.702 m) 188 lb (85.3 kg)    LMP SpO2 BMI OB Status Smoking Status       10/28/2002 (Exact Date) 97% 29.44 kg/m2 Postmenopausal Current Every Day Smoker       BMI and BSA Data     Body Mass Index Body Surface Area    29.44 kg/m 2 2.01 m 2         Preferred Pharmacy       Pharmacy Name Phone    [facility] [address], [address] [phone]         Your Updated Medication List          This list is accurate as of 8/15/18 12:34 PM.  Always use your most recent med list.                azelastine 137 mcg (0.1 %) nasal spray   Commonly known as:  ASTELIN   2 Sprays by Both Nostrils route once over twenty-four (24) hours. Use in each nostril as directed       B COMPLEX 1 PO   Take 1 Cap by mouth daily. cholecalciferol 1,000 unit tablet   Commonly known as:  VITAMIN D3   Take 1,000 Units by mouth daily. LORazepam 0.5 mg tablet   Commonly known as:  ATIVAN   Take 1 Tab by mouth daily as needed for Anxiety. Need office visit for further refills               We Performed the Following     AMB POC URINALYSIS DIP STICK AUTO W/O MICRO [14212 CPT(R)]     CULTURE, URINE [78763 CPT(R)]       Follow-up Instructions     Return if symptoms worsen or fail to improve. To-Do List     08/15/2018     Imaging:  CT PELV W WO CONT        08/15/2018     Imaging:  ZACHARY MAMMO BI SCREENING INCL CAD          Referral Information     Referral ID Referred By Referred To       [id number] JOSE VANN Not Available         Visits Status Start Date End Date    1 New Request 8/15/18 8/15/19    If your referral has a status of pending review or denied, additional information will be sent to support the outcome of this decision. Introducing [facility]! [employer] introduces Gradeable patient portal. Now you can access parts of your medical record, email your doctor's office, and request medication refills online. 1. In your internet browser, go to [email or web address]   2. Click on the First Time User? Click Here link in the Sign In box.  You will see the New Member Sign Up page.  3. Enter your [employer] Access Code exactly as it appears below. You will not need to use this code after youve completed the sign-up process. If you do not sign up before the expiration date, you must request a new code. · [employer] Access Code: [email or web address]  Expires: 8/21/2018  3:08 PM    4. Enter the last four digits of your Social Security Number (xxxx) and Date of Birth (mm/dd/yyyy) as indicated and click Submit. You will be taken to the next sign-up page. 5. Create a [employer] ID. This will be your [employer] login ID and cannot be changed, so think of one that is secure and easy to remember. 6. Create a [employer] password. You can change your password at any time. 7. Enter your Password Reset Question and Answer. This can be used at a later time if you forget your password. 8. Enter your e-mail address. You will receive e-mail notification when new information is available in [address]. 9. Click Sign Up. You can now view and download portions of your medical record. 10. Click the Download Summary menu link to download a portable copy of your medical information. If you have questions, please visit the Frequently Asked Questions section of the [employer] website. Remember, [employer] is NOT to be used for urgent needs. For medical emergencies, dial 911. Now available from your iPhone and Android! Please provide this summary of care documentation to your next provider. Your primary care clinician is listed as Milly Au. If you have any questions after today's visit, please call [phone].

## 2018-08-16 ENCOUNTER — HOSPITAL ENCOUNTER (OUTPATIENT)
Dept: CT IMAGING | Age: 63
Discharge: HOME | End: 2018-08-16
Attending: INTERNAL MEDICINE
Payer: COMMERCIAL

## 2018-08-16 DIAGNOSIS — R10.32 LEFT LOWER QUADRANT PAIN: ICD-10-CM

## 2018-08-16 PROCEDURE — 74011636320 HC RX REV CODE- 636/320: Performed by: INTERNAL MEDICINE

## 2018-08-16 PROCEDURE — 72193 CT PELVIS W/DYE: CPT

## 2018-08-16 PROCEDURE — 74011000258 HC RX REV CODE- 258: Performed by: INTERNAL MEDICINE

## 2018-08-16 RX ORDER — SODIUM CHLORIDE 0.9 % (FLUSH) 0.9 %
10 SYRINGE (ML) INJECTION
Status: COMPLETED | OUTPATIENT
Start: 2018-08-16 | End: 2018-08-16

## 2018-08-16 RX ADMIN — IOPAMIDOL 100 ML: 612 INJECTION, SOLUTION INTRAVENOUS at 19:44

## 2018-08-16 RX ADMIN — IOHEXOL 50 ML: 240 INJECTION, SOLUTION INTRATHECAL; INTRAVASCULAR; INTRAVENOUS; ORAL at 19:44

## 2018-08-16 RX ADMIN — SODIUM CHLORIDE 100 ML: 900 INJECTION, SOLUTION INTRAVENOUS at 19:44

## 2018-08-16 RX ADMIN — Medication 10 ML: at 19:44

## 2018-08-17 ENCOUNTER — TELEPHONE (OUTPATIENT)
Dept: INTERNAL MEDICINE CLINIC | Age: 63
End: 2018-08-17

## 2018-08-17 DIAGNOSIS — F41.9 ANXIETY: ICD-10-CM

## 2018-08-17 LAB — BACTERIA UR CULT: NORMAL

## 2018-08-17 RX ORDER — LORAZEPAM 0.5 MG/1
0.5 TABLET ORAL
Qty: 30 TAB | Refills: 0 | OUTPATIENT
Start: 2018-08-17 | End: 2019-09-20

## 2018-08-17 NOTE — TELEPHONE ENCOUNTER
Discussed with patient results of her abdominal CT. No ventral hernias or any other abnormalities noted other than some diverticuli. Patient stated understanding, but still has some discomfort. I recommended patient may consider working on weight reduction. She can for now wear supportive pants or underwear which may help reduce the discomfort.

## 2018-08-17 NOTE — TELEPHONE ENCOUNTER
Call to pharmacy listed, left voicemail on pharmacist line with Rx information and prescription called in successfully. Requested Prescriptions     Signed Prescriptions Disp Refills    LORazepam (ATIVAN) 0.5 mg tablet 30 Tab 0     Sig: Take 1 Tab by mouth daily as needed for Anxiety.  Need office visit for further refills     Authorizing Provider: Christiana Falcon

## 2018-08-17 NOTE — TELEPHONE ENCOUNTER
Follow up call to Ms. Mai Surinderarun - Pt verified self and birth date for privacy precautions. Patient was advised her CT has just recently been resulted and once reviewed by Dr. Mikie Mejia she will be advised of further information. Pt questioned if she would receive the results by the end of the day today and was advised Dr. Mikie Mejia is currently seeing other pt's and that could not be guaranteed. MsGarrett Mai Philip acknowledged understanding with no further questions at this time.

## 2018-08-17 NOTE — TELEPHONE ENCOUNTER
From: Kingsley Hess  To: Mary Li MD  Sent: 8/17/2018 9:14 AM EDT  Subject: Medication Renewal Request    Original authorizing provider: Mary Li MD    [place] [address]  Nemiah Sandifer would like a refill of the following medications:  LORazepam (ATIVAN) 0.5 mg tablet Sriram Ware MD]    Preferred pharmacy: [id number], [address]    Comment:

## 2018-10-23 ENCOUNTER — OFFICE VISIT (OUTPATIENT)
Dept: INTERNAL MEDICINE CLINIC | Age: 63
End: 2018-10-23

## 2018-10-23 VITALS
SYSTOLIC BLOOD PRESSURE: 138 MMHG | OXYGEN SATURATION: 98 % | TEMPERATURE: 99 F | WEIGHT: 187 LBS | BODY MASS INDEX: 29.35 KG/M2 | DIASTOLIC BLOOD PRESSURE: 70 MMHG | RESPIRATION RATE: 16 BRPM | HEIGHT: 67 IN | HEART RATE: 68 BPM

## 2018-10-23 DIAGNOSIS — R06.2 WHEEZING: ICD-10-CM

## 2018-10-23 DIAGNOSIS — J01.10 ACUTE NON-RECURRENT FRONTAL SINUSITIS: Primary | ICD-10-CM

## 2018-10-23 RX ORDER — AZELASTINE 1 MG/ML
1 SPRAY, METERED NASAL 2 TIMES DAILY
Qty: 1 BOTTLE | Refills: 1 | Status: SHIPPED | OUTPATIENT
Start: 2018-10-23 | End: 2019-02-17 | Stop reason: SDUPTHER

## 2018-10-23 RX ORDER — DOXYCYCLINE 100 MG/1
100 CAPSULE ORAL 2 TIMES DAILY
Qty: 14 CAP | Refills: 0 | Status: SHIPPED | OUTPATIENT
Start: 2018-10-23 | End: 2018-10-30

## 2018-10-23 RX ORDER — METHYLPREDNISOLONE 4 MG/1
TABLET ORAL
Qty: 1 DOSE PACK | Refills: 0 | Status: SHIPPED | OUTPATIENT
Start: 2018-10-23 | End: 2019-09-20

## 2018-10-23 NOTE — PROGRESS NOTES
HISTORY OF PRESENT ILLNESS  Lowell Coyle is a 66 y.o. female. Patient reports nasal congestion, sinus pressure, headache, cough, and fatigue for over 1 week. She has tried OTC cough and cold meds, flonase, nasal saline spray without relief. Low-grade and wheezing worse today. Visit Vitals  /70 (BP 1 Location: Left arm, BP Patient Position: Sitting)   Pulse 68   Temp 99 °F (37.2 °C) (Oral)   Resp 16   Ht 5' 7\" (1.702 m)   Wt 187 lb (84.8 kg)   LMP 10/28/2002 (Exact Date) Comment: age 54   SpO2 98%   BMI 29.29 kg/m²       HPI    Review of Systems   Constitutional: Positive for malaise/fatigue. HENT: Positive for congestion and sinus pain. Respiratory: Positive for cough. Neurological: Positive for headaches. Physical Exam   Constitutional: She is oriented to person, place, and time. She appears well-developed and well-nourished. HENT:   Head: Normocephalic. Right Ear: Hearing, external ear and ear canal normal. Tympanic membrane is injected. Left Ear: Hearing, external ear and ear canal normal. Tympanic membrane is injected. Nose: Mucosal edema and rhinorrhea present. Right sinus exhibits frontal sinus tenderness. Left sinus exhibits frontal sinus tenderness. Mouth/Throat: Uvula is midline, oropharynx is clear and moist and mucous membranes are normal.   Neck: Normal range of motion. Neck supple. Cardiovascular: Normal rate, regular rhythm and normal heart sounds. Pulmonary/Chest: She has wheezes (scattered throughout-expiratory). Lymphadenopathy:     She has no cervical adenopathy. Neurological: She is alert and oriented to person, place, and time. Skin: Skin is warm and dry. Psychiatric: She has a normal mood and affect. ASSESSMENT and PLAN    ICD-10-CM ICD-9-CM    1. Acute non-recurrent frontal sinusitis J01.10 461.1    2.  Wheezing R06.2 786.07      Orders Placed This Encounter    fluticasone propionate (FLONASE NA)    methylPREDNISolone (MEDROL DOSEPACK) 4 mg tablet    doxycycline (VIBRAMYCIN) 100 mg capsule    azelastine (ASTELIN) 137 mcg (0.1 %) nasal spray   follow-up if no improvement in the next 2-3 days

## 2018-10-23 NOTE — LETTER
NOTIFICATION RETURN TO WORK / SCHOOL    10/23/2018 10:20 AM    Ms. Kermit Oconnell  [address] [address]      To Whom It May Concern:    Kermit Oconnell is currently under the care of Rachana Shah. She will return to work/school on: 10/24/18    If there are questions or concerns please have the patient contact our office.         Sincerely,      Monique Schwab NP

## 2018-10-30 ENCOUNTER — TELEPHONE (OUTPATIENT)
Dept: INTERNAL MEDICINE CLINIC | Age: 63
End: 2018-10-30

## 2018-10-30 ENCOUNTER — HOSPITAL ENCOUNTER (OUTPATIENT)
Dept: GENERAL RADIOLOGY | Age: 63
Discharge: HOME | End: 2018-10-30
Payer: COMMERCIAL

## 2018-10-30 ENCOUNTER — OFFICE VISIT (OUTPATIENT)
Dept: INTERNAL MEDICINE CLINIC | Age: 63
End: 2018-10-30

## 2018-10-30 VITALS
HEART RATE: 88 BPM | TEMPERATURE: 98.2 F | RESPIRATION RATE: 16 BRPM | OXYGEN SATURATION: 98 % | WEIGHT: 188.2 LBS | BODY MASS INDEX: 29.54 KG/M2 | SYSTOLIC BLOOD PRESSURE: 130 MMHG | DIASTOLIC BLOOD PRESSURE: 80 MMHG | HEIGHT: 67 IN

## 2018-10-30 DIAGNOSIS — J32.9 RECURRENT SINUSITIS: Primary | ICD-10-CM

## 2018-10-30 DIAGNOSIS — R05.8 PRODUCTIVE COUGH: ICD-10-CM

## 2018-10-30 DIAGNOSIS — R09.81 NASAL CONGESTION: ICD-10-CM

## 2018-10-30 PROCEDURE — 71046 X-RAY EXAM CHEST 2 VIEWS: CPT

## 2018-10-30 NOTE — PROGRESS NOTES
HISTORY OF PRESENT ILLNESS  Jessy Gregorio is a 66 y.o. female. Patient reports persistent nasal congestion, productive cough, and fatigue. She completed a medrol dose pack and 7 day course of doxycycline 2 days ago and is still coughing up thick brown sputum. Denies fever. She has mild shortness of breath at times due to coughing and inability to breath out of her nose. She feels like these symptoms started when the heat was turned on in her office building. The last time she had these issues was when the air conditioning was turned in last spring. She is taking azelastine, flonase, and zyrtec. She saw an allergist about 1 year ago. She has not seen ENT. Visit Vitals  /80 (BP 1 Location: Left arm, BP Patient Position: Sitting)   Pulse 88   Temp 98.2 °F (36.8 °C)   Resp 16   Ht 5' 7\" (1.702 m)   Wt 188 lb 3.2 oz (85.4 kg)   LMP 10/28/2002 (Exact Date) Comment: age 54   SpO2 98%   BMI 29.48 kg/m²       HPI    Review of Systems   HENT: Positive for congestion. Respiratory: Positive for cough and sputum production. Physical Exam   Constitutional: She is oriented to person, place, and time. She appears well-developed and well-nourished. HENT:   Head: Normocephalic. Right Ear: Hearing, tympanic membrane, external ear and ear canal normal.   Left Ear: Hearing, tympanic membrane, external ear and ear canal normal.   Nose: Mucosal edema (severe erythematous) and rhinorrhea present. Mouth/Throat: Uvula is midline, oropharynx is clear and moist and mucous membranes are normal.   Neck: Normal range of motion. Neck supple. Cardiovascular: Normal rate, regular rhythm and normal heart sounds. Pulmonary/Chest: Effort normal and breath sounds normal. She has no wheezes. Mild crackles in right lower lobe   Lymphadenopathy:     She has no cervical adenopathy. Neurological: She is alert and oriented to person, place, and time. Skin: Skin is warm and dry.    Psychiatric: She has a normal mood and affect. ASSESSMENT and PLAN    ICD-10-CM ICD-9-CM    1. Recurrent sinusitis J32.9 473.9 REFERRAL TO ENT-OTOLARYNGOLOGY   2. Productive cough R05 786.2 XR CHEST PA LAT   3.  Nasal congestion R09.81 478.19      Orders Placed This Encounter    XR CHEST PA LAT    REFERRAL TO ENT-OTOLARYNGOLOGY   continue allergy regimen  Referral given to ENT  CXR ordered

## 2018-10-30 NOTE — PROGRESS NOTES
Chief Complaint   Patient presents with    Nasal Congestion    Cough     Reviewed record in preparation for visit and have obtained necessary documentation. Identified pt with two pt identifiers(name and ). Health Maintenance Due   Topic    Shingrix Vaccine Age 49> (1 of 2)    Influenza Age 5 to Adult     [address]          Chief Complaint   Patient presents with    Nasal Congestion    Cough        Wt Readings from Last 3 Encounters:   10/30/18 188 lb 3.2 oz (85.4 kg)   10/23/18 187 lb (84.8 kg)   08/15/18 188 lb (85.3 kg)     Temp Readings from Last 3 Encounters:   10/30/18 98.2 °F (36.8 °C)   10/23/18 99 °F (37.2 °C) (Oral)   08/15/18 97.9 °F (36.6 °C) (Oral)     BP Readings from Last 3 Encounters:   10/30/18 130/80   10/23/18 138/70   08/15/18 130/90     Pulse Readings from Last 3 Encounters:   10/30/18 88   10/23/18 68   08/15/18 87           Learning Assessment:  :     Learning Assessment 5/15/2015 3/25/2014   PRIMARY LEARNER Patient Patient   HIGHEST LEVEL OF EDUCATION - PRIMARY LEARNER  > 4 YEARS OF COLLEGE 4 YEARS OF COLLEGE   BARRIERS PRIMARY LEARNER NONE NONE   CO-LEARNER CAREGIVER No No   PRIMARY LANGUAGE ENGLISH ENGLISH    NEED No No   LEARNER PREFERENCE PRIMARY DEMONSTRATION DEMONSTRATION     PICTURES READING     LISTENING LISTENING   LEARNING SPECIAL TOPICS no no   ANSWERED BY patient patient   RELATIONSHIP SELF SELF       Depression Screening:  :     PHQ over the last two weeks 8/15/2018   Little interest or pleasure in doing things Not at all   Feeling down, depressed, irritable, or hopeless Not at all   Total Score PHQ 2 0       Fall Risk Assessment:  :     Fall Risk Assessment, last 12 mths 8/15/2018   Able to walk? Yes   Fall in past 12 months? No       Abuse Screening:  :     Abuse Screening Questionnaire 8/15/2018 5/15/2015 3/25/2014   Do you ever feel afraid of your partner?  N N N   Are you in a relationship with someone who physically or mentally threatens you? N N N   Is it safe for you to go home? Y Y Y       Coordination of Care Questionnaire:  :     1) Have you been to an emergency room, urgent care clinic since your last visit? no   Hospitalized since your last visit? no             2) Have you seen or consulted any other health care providers outside of [address] [place] [address] since your last visit? no  (Include any pap smears or colon screenings in this section.)    3) Do you have an Advance Directive on file? no  Reviewed record  In preparation for visit and have obtained necessary documentation. 4) Are you interested in receiving information on Advance Directives? NO      Patient is accompanied by self I have received verbal consent from Delonte Nunez to discuss any/all medical information while they are present in the room.

## 2018-10-31 ENCOUNTER — TELEPHONE (OUTPATIENT)
Dept: INTERNAL MEDICINE CLINIC | Age: 63
End: 2018-10-31

## 2018-10-31 NOTE — TELEPHONE ENCOUNTER
----- Message from Jennifer Watson NP sent at 10/31/2018 12:19 PM EDT -----  Please notify patient of normal chest XRAY.  NO pneumonia.  ----- Message -----  From: Jaylon, Rad Results In  Sent: 10/30/2018   4:54 PM  To: Jennifer Watson NP

## 2018-11-02 NOTE — TELEPHONE ENCOUNTER
Patient called back. I relayed the message from [facility] about her chest xray. She expressed understanding.

## 2019-02-19 RX ORDER — AZELASTINE 1 MG/ML
SPRAY, METERED NASAL
Qty: 1 BOTTLE | Refills: 1 | Status: SHIPPED | OUTPATIENT
Start: 2019-02-19 | End: 2019-07-05 | Stop reason: SDUPTHER

## 2019-07-08 ENCOUNTER — OFFICE VISIT (OUTPATIENT)
Dept: INTERNAL MEDICINE CLINIC | Age: 64
End: 2019-07-08

## 2019-07-08 VITALS
HEART RATE: 82 BPM | BODY MASS INDEX: 30.99 KG/M2 | DIASTOLIC BLOOD PRESSURE: 72 MMHG | TEMPERATURE: 99.1 F | WEIGHT: 186 LBS | HEIGHT: 65 IN | SYSTOLIC BLOOD PRESSURE: 130 MMHG | RESPIRATION RATE: 16 BRPM | OXYGEN SATURATION: 99 %

## 2019-07-08 DIAGNOSIS — H00.015 HORDEOLUM EXTERNUM OF LEFT LOWER EYELID: ICD-10-CM

## 2019-07-08 DIAGNOSIS — H00.025 HORDEOLUM INTERNUM LEFT LOWER EYELID: ICD-10-CM

## 2019-07-08 DIAGNOSIS — S63.690A OTHER SPRAIN OF RIGHT INDEX FINGER, INITIAL ENCOUNTER: Primary | ICD-10-CM

## 2019-07-08 RX ORDER — NAPROXEN 500 MG/1
500 TABLET ORAL 2 TIMES DAILY WITH MEALS
Qty: 10 TAB | Refills: 0 | Status: SHIPPED | OUTPATIENT
Start: 2019-07-08 | End: 2019-07-13

## 2019-07-08 RX ORDER — ERYTHROMYCIN 5 MG/G
0.2 OINTMENT OPHTHALMIC EVERY 6 HOURS
Qty: 1 TUBE | Refills: 0 | Status: SHIPPED | OUTPATIENT
Start: 2019-07-08 | End: 2019-09-20

## 2019-07-08 NOTE — PROGRESS NOTES
HISTORY OF PRESENT ILLNESS  Mikayla Lowe is a 66 y.o. female. Patient report stye of left lower lid since yesterday. She reports a painful bump on the lid with redness. She also reports ongoing pain of right index finger since a fall 2 months ago. She was seen at [facility] and xray was normal. It was diagnosed as a sprain. She never wore a splint. It seems worse with bending and sometimes feels \"tight\". NSAIDS help with the pain. Injury occurred 5/3/19 when she mistepped on a sidewalk and fell off the edge onto her right hand, where she was also holding a clipboard. It was very swollen initially and when xray was taken. Visit Vitals  /72 (BP 1 Location: Left arm, BP Patient Position: Sitting)   Pulse 82   Temp 99.1 °F (37.3 °C) (Oral)   Resp 16   Ht 5' 5\" (1.651 m)   Wt 186 lb (84.4 kg)   LMP 10/28/2002 (Exact Date) Comment: age 54   SpO2 99%   BMI 30.95 kg/m²       HPI    Review of Systems   HENT:        Left eye stye   Musculoskeletal:        Right index finger pain       Physical Exam   Constitutional: She appears well-developed and well-nourished. Eyes: Left eye exhibits hordeolum (internal and external lower lid). Musculoskeletal:   Right index finger tenderness with flexion and extension; extends from MCP to PIP joint, mainly laterally; mild swelling noted to finger       ASSESSMENT and PLAN    ICD-10-CM ICD-9-CM    1. Other sprain of right index finger, initial encounter S63.690A 842.19    2. Hordeolum externum of left lower eyelid H00.015 373.11    3.  Hordeolum internum left lower eyelid H00.025 373.12      Orders Placed This Encounter    naproxen (NAPROSYN) 500 mg tablet    erythromycin (ILOTYCIN) ophthalmic ointment   wear finger splint x 4 weeks  Consider ortho follow-up and xray if no improvement  Naproxen 500 mg BID x 5 days-take with food

## 2019-09-20 ENCOUNTER — OFFICE VISIT (OUTPATIENT)
Dept: INTERNAL MEDICINE CLINIC | Age: 64
End: 2019-09-20

## 2019-09-20 VITALS
DIASTOLIC BLOOD PRESSURE: 86 MMHG | WEIGHT: 186.8 LBS | HEIGHT: 65 IN | RESPIRATION RATE: 16 BRPM | OXYGEN SATURATION: 97 % | TEMPERATURE: 99 F | HEART RATE: 99 BPM | BODY MASS INDEX: 31.12 KG/M2 | SYSTOLIC BLOOD PRESSURE: 140 MMHG

## 2019-09-20 DIAGNOSIS — R09.81 NASAL CONGESTION: ICD-10-CM

## 2019-09-20 DIAGNOSIS — J00 ACUTE NASOPHARYNGITIS: Primary | ICD-10-CM

## 2019-09-20 DIAGNOSIS — R05.9 COUGH: ICD-10-CM

## 2019-09-20 RX ORDER — ALBUTEROL SULFATE 90 UG/1
2 AEROSOL, METERED RESPIRATORY (INHALATION)
Qty: 1 INHALER | Refills: 0 | Status: SHIPPED | OUTPATIENT
Start: 2019-09-20 | End: 2020-11-11

## 2019-09-20 RX ORDER — FLUTICASONE PROPIONATE 50 MCG
2 SPRAY, SUSPENSION (ML) NASAL DAILY
Qty: 1 BOTTLE | Refills: 0 | Status: SHIPPED | OUTPATIENT
Start: 2019-09-20

## 2019-09-20 NOTE — PATIENT INSTRUCTIONS

## 2019-09-20 NOTE — PROGRESS NOTES
HISTORY OF PRESENT ILLNESS  Mikayla Lowe is a 60 y.o. female. Patient reports worsening cough with clear to yellow sputum, clear congestion, post-nasal drip, and fatigue x 3-4 days. She has tried astelin, zyrtec, and mucinex with no improvement. Patient was on vacation for the past week. She went to a wedding around a lot of people. She also reports a lot of moisture in the hotel. No fever noted, but has low-grade today. Patient has history of bronchitis and sinus infections. Visit Vitals  /86 (BP 1 Location: Left arm, BP Patient Position: Sitting)   Pulse 99   Temp 99 °F (37.2 °C) (Oral)   Resp 16   Ht 5' 5\" (1.651 m)   Wt 186 lb 12.8 oz (84.7 kg)   LMP 10/28/2002 (Exact Date) Comment: age 54   SpO2 97%   BMI 31.09 kg/m²       HPI    Review of Systems   HENT: Positive for congestion. Respiratory: Positive for cough and sputum production. Physical Exam   Constitutional: She is oriented to person, place, and time. She appears well-developed and well-nourished. HENT:   Head: Normocephalic. Right Ear: Hearing, tympanic membrane, external ear and ear canal normal.   Left Ear: Hearing, tympanic membrane, external ear and ear canal normal.   Nose: Mucosal edema (erythematous) and rhinorrhea present. Mouth/Throat: Uvula is midline and mucous membranes are normal. Posterior oropharyngeal erythema (mild) present. Neck: Normal range of motion. Neck supple. Cardiovascular: Normal rate, regular rhythm and normal heart sounds. Pulmonary/Chest: Effort normal and breath sounds normal.   Lymphadenopathy:     She has no cervical adenopathy. Neurological: She is alert and oriented to person, place, and time. Skin: Skin is warm and dry. Psychiatric: She has a normal mood and affect. ASSESSMENT and PLAN    ICD-10-CM ICD-9-CM    1. Acute nasopharyngitis J00 460    2. Cough R05 786.2    3.  Nasal congestion R09.81 478.19      Orders Placed This Encounter    albuterol (PROVENTIL HFA, VENTOLIN HFA, PROAIR HFA) 90 mcg/actuation inhaler    fluticasone propionate (FLONASE) 50 mcg/actuation nasal spray   start flonase  Continue astelin and mucinex  Albuterol as needed  Follow-up if no improvement over the next week  Rest and hydrate

## 2019-09-20 NOTE — PROGRESS NOTES
Chief Complaint   Patient presents with    Cold     Pt c/o runny nose, watery eyes, & productive cough that started Tuesday.

## 2019-09-20 NOTE — LETTER
NOTIFICATION RETURN TO WORK / SCHOOL    9/20/2019 4:12 PM    Ms. Tereso Solis  [address] [address]      To Whom It May Concern:    Tereso Solis is currently under the care of Rachana Shah. She was evaluated on 9/20/19    If there are questions or concerns please have the patient contact our office.         Sincerely,      Marie Stephens NP

## 2019-09-26 ENCOUNTER — TELEPHONE (OUTPATIENT)
Dept: INTERNAL MEDICINE CLINIC | Age: 64
End: 2019-09-26

## 2019-09-26 NOTE — TELEPHONE ENCOUNTER
Pt  who is on the [employer]  Gave him the information that Maryann Henry left on there VM  About the Flonase .  That the patient can buy it over the counter

## 2019-09-26 NOTE — TELEPHONE ENCOUNTER
Call to pt - LVM requesting she contact the office back. Office received notice from [facility] advising a PA is needed for Flonase nasal spray. Pt will be encouraged to purchase this item over the counter.

## 2019-09-30 ENCOUNTER — OFFICE VISIT (OUTPATIENT)
Dept: INTERNAL MEDICINE CLINIC | Age: 64
End: 2019-09-30

## 2019-09-30 VITALS
HEIGHT: 65 IN | BODY MASS INDEX: 30.75 KG/M2 | HEART RATE: 88 BPM | RESPIRATION RATE: 18 BRPM | WEIGHT: 184.6 LBS | DIASTOLIC BLOOD PRESSURE: 88 MMHG | SYSTOLIC BLOOD PRESSURE: 140 MMHG | OXYGEN SATURATION: 98 % | TEMPERATURE: 98 F

## 2019-09-30 DIAGNOSIS — J40 BRONCHITIS: Primary | ICD-10-CM

## 2019-09-30 RX ORDER — METHYLPREDNISOLONE 4 MG/1
TABLET ORAL
Qty: 1 DOSE PACK | Refills: 0 | Status: SHIPPED | OUTPATIENT
Start: 2019-09-30 | End: 2020-01-09

## 2019-09-30 NOTE — PROGRESS NOTES
Juan Miguel Alexander is a 60 y.o. female    Chief Complaint   Patient presents with    Cough    Nasal Congestion    Fatigue     1. Have you been to the ER, urgent care clinic since your last visit? Hospitalized since your last visit? No    2. Have you seen or consulted any other health care providers outside of the [address] [place] [address] since your last visit? Include any pap smears or colon screening.  No     Visit Vitals  /88 (BP 1 Location: Right arm, BP Patient Position: Sitting)   Pulse 88   Temp 98 °F (36.7 °C) (Oral)   Resp 18   Ht 5' 5\" (1.651 m)   Wt 184 lb 9.6 oz (83.7 kg)   SpO2 98%   BMI 30.72 kg/m²

## 2019-09-30 NOTE — PROGRESS NOTES
Subjective:      Tereso Solis is a 60 y.o. female who presents today for acute care. She is a patient of Dr. Jude Short.    URI: symptoms started 2 weeks ago, they include cough, congestion, fatigue. She saw NP Thrift last week and was started on flonase and albuterol. She notes her fatigue has worsened. She denies fevers, SOB, productive cough. She has tried albuterol, flonase, mucinex, saline spray, azelastine, flonase at night. She notes cough is getting deeper and \"more like a bark\", sinus fullness. Sick contacts: none     Patient Active Problem List    Diagnosis Date Noted    Advance directive discussed with patient 02/24/2017    Controlled substance agreement signed 02/24/2017    Colon cancer screening 08/22/2014    Squamous cell carcinoma of foot 08/22/2014    Allergic rhinitis 07/22/2014    Pap smear for cervical cancer screening 07/23/2012    History of screening mammography 07/23/2012    History of kidney stones 07/23/2012    Hemorrhoid 10/12/2011    Fibrocystic breast 10/12/2011    Anxiety 10/12/2011     Current Outpatient Medications   Medication Sig Dispense Refill    albuterol (PROVENTIL HFA, VENTOLIN HFA, PROAIR HFA) 90 mcg/actuation inhaler Take 2 Puffs by inhalation every six (6) hours as needed for Wheezing. 1 Inhaler 0    fluticasone propionate (FLONASE) 50 mcg/actuation nasal spray 2 Sprays by Both Nostrils route daily. 1 Bottle 0    azelastine (ASTELIN) 137 mcg (0.1 %) nasal spray INSTILL 1 SPRAY BY BOTH NOSTRILS ROUTE TWO (2) TIMES A DAY. 1 Bottle 1    cholecalciferol (VITAMIN D3) 1,000 unit tablet Take 1,000 Units by mouth daily.  VITAMIN B COMPLEX (B COMPLEX 1 PO) Take 1 Cap by mouth daily.        Allergies   Allergen Reactions    Sulfa (Sulfonamide Antibiotics) Hives     1 week after starting Bactrim - Feb 2016    Clindamycin Rash    Keflex [Cephalexin] Rash    Morphine Other (comments)     Dysphoria/anxiety    Pcn [Penicillins] Rash     Past Medical History: Diagnosis Date    Anxiety     Dermatitis     atypical    Fibrocystic breast     Hemorrhoid     History of kidney stones     Last times was circa .  History of vertigo      Past Surgical History:   Procedure Laterality Date    HX GYN       x2    HX ORTHOPAEDIC      cyst removed left 3rd digit - Dr Orville Evans HX OTHER SURGICAL  10/28/2011    Incision and drainage of septoc left 3rd finger joint - catbite    HX OTHER SURGICAL  2014    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 Review of Systems    A comprehensive review of systems was negative except for that written in the HPI. Objective:     Visit Vitals  /88 (BP 1 Location: Right arm, BP Patient Position: Sitting)   Pulse 88   Temp 98 °F (36.7 °C) (Oral)   Resp 18   Ht 5' 5\" (1.651 m)   Wt 184 lb 9.6 oz (83.7 kg)   LMP 10/28/2002 (Exact Date) Comment: age 54   SpO2 98%   BMI 30.72 kg/m²     General appearance: alert, cooperative, no distress, appears stated age  Head: Normocephalic, without obvious abnormality, atraumatic  Eyes: negative  Ears: abnormal TM AD - serous middle ear fluid, abnormal TM AS - serous middle ear fluid  Nose: turbinates edematous, inflamed, no sinus tenderness  Throat: Lips, mucosa, and tongue normal. Teeth and gums normal  Neck: supple, symmetrical, trachea midline and mild anterior cervical adenopathy  Lungs: clear to auscultation bilaterally  Heart: regular rate and rhythm, S1, S2 normal, no murmur, click, rub or gallop  Neurologic: Grossly normal  Nursing note and vitals reviewed  Assessment/Plan:       ICD-10-CM ICD-9-CM    1. Bronchitis J40 490 methylPREDNISolone (MEDROL DOSEPACK) 4 mg tablet   She will start medrol dosepack now, if no improvement in 48 hours will start doxy. Follow-up and Dispositions    · Return if symptoms worsen or fail to improve, for Message or call if no improvement by Wed.              Advised her to call back or return to office if symptoms worsen/change/persist.  Discussed expected course/resolution/complications of diagnosis in detail with patient. Medication risks/benefits/costs/interactions/alternatives discussed with patient. She was given an after visit summary which includes diagnoses, current medications, & vitals. She expressed understanding with the diagnosis and plan.

## 2019-09-30 NOTE — LETTER
NOTIFICATION RETURN TO WORK    9/30/2019 11:51 AM    Ms. Carlos Patel  [address] [id number]      To Whom It May Concern:    Carlos Patel is currently under the care of [facility]. She will return to work on Wednesday. She was seen today and was advised to stay home until Wednesday because she is acutely ill. If there are questions or concerns please have the patient contact our office.         Sincerely,      Wendy Sethi NP

## 2019-09-30 NOTE — PATIENT INSTRUCTIONS
Bronchitis: Care Instructions  Your Care Instructions    Bronchitis is inflammation of the bronchial tubes, which carry air to the lungs. The tubes swell and produce mucus, or phlegm. The mucus and inflamed bronchial tubes make you cough. You may have trouble breathing. Most cases of bronchitis are caused by viruses like those that cause colds. Antibiotics usually do not help and they may be harmful. Bronchitis usually develops rapidly and lasts about 2 to 3 weeks in otherwise healthy people. Follow-up care is a key part of your treatment and safety. Be sure to make and go to all appointments, and call your doctor if you are having problems. It's also a good idea to know your test results and keep a list of the medicines you take. How can you care for yourself at home? · Take all medicines exactly as prescribed. Call your doctor if you think you are having a problem with your medicine. · Get some extra rest.  · Take an over-the-counter pain medicine, such as acetaminophen (Tylenol), ibuprofen (Advil, Motrin), or naproxen (Aleve) to reduce fever and relieve body aches. Read and follow all instructions on the label. · Do not take two or more pain medicines at the same time unless the doctor told you to. Many pain medicines have acetaminophen, which is Tylenol. Too much acetaminophen (Tylenol) can be harmful. · Take an over-the-counter cough medicine that contains dextromethorphan to help quiet a dry, hacking cough so that you can sleep. Avoid cough medicines that have more than one active ingredient. Read and follow all instructions on the label. · Breathe moist air from a humidifier, hot shower, or sink filled with hot water. The heat and moisture will thin mucus so you can cough it out. · Do not smoke. Smoking can make bronchitis worse. If you need help quitting, talk to your doctor about stop-smoking programs and medicines. These can increase your chances of quitting for good.   When should you call for help? Call 911 anytime you think you may need emergency care. For example, call if:    · You have severe trouble breathing.    Call your doctor now or seek immediate medical care if:    · You have new or worse trouble breathing.     · You cough up dark brown or bloody mucus (sputum).     · You have a new or higher fever.     · You have a new rash.    Watch closely for changes in your health, and be sure to contact your doctor if:    · You cough more deeply or more often, especially if you notice more mucus or a change in the color of your mucus.     · You are not getting better as expected. Where can you learn more? Go to http://melodie-ramu.info/. Enter H333 in the search box to learn more about \"Bronchitis: Care Instructions. \"  Current as of: June 9, 2019  Content Version: 12.2  © 1846-5160 [employer]. Care instructions adapted under license by [employer] (which disclaims liability or warranty for this information). If you have questions about a medical condition or this instruction, always ask your healthcare professional. [address] any warranty or liability for your use of this information.

## 2019-11-12 ENCOUNTER — OFFICE VISIT (OUTPATIENT)
Dept: INTERNAL MEDICINE CLINIC | Age: 64
End: 2019-11-12

## 2019-11-12 VITALS
TEMPERATURE: 98.8 F | HEIGHT: 65 IN | RESPIRATION RATE: 16 BRPM | WEIGHT: 187.2 LBS | SYSTOLIC BLOOD PRESSURE: 166 MMHG | DIASTOLIC BLOOD PRESSURE: 100 MMHG | OXYGEN SATURATION: 98 % | BODY MASS INDEX: 31.19 KG/M2 | HEART RATE: 88 BPM

## 2019-11-12 DIAGNOSIS — H57.11 ACUTE RIGHT EYE PAIN: Primary | ICD-10-CM

## 2019-11-12 NOTE — PROGRESS NOTES
HISTORY OF PRESENT ILLNESS  Zina Cuenca is a 60 y.o. female. Patient reports severe right eye pain since this morning after washing with a face scrub that contained granules. She believes she got some of the granules in her eye. She tried flushing her eye, but reports no improvement. She states it feels better if the lid is not touching her eye. No vision change. Visit Vitals  BP (!) 166/100 (BP 1 Location: Left arm, BP Patient Position: Sitting)   Pulse 88   Temp 98.8 °F (37.1 °C) (Oral)   Resp 16   Ht 5' 5\" (1.651 m)   Wt 187 lb 3.2 oz (84.9 kg)   LMP 10/28/2002 (Exact Date) Comment: age 54   SpO2 98%   BMI 31.15 kg/m²       HPI    Review of Systems   Eyes: Positive for pain. Physical Exam   Constitutional: She is oriented to person, place, and time. She appears well-developed and well-nourished. Eyes: EOM are normal.   No foreign body visible in right eye; right eye with significant pain during exam making exam difficulty to perform. No drainage and minimal erythema noted   Neurological: She is alert and oriented to person, place, and time. Psychiatric: She has a normal mood and affect. ASSESSMENT and PLAN    ICD-10-CM ICD-9-CM    1. Acute right eye pain H57.11 379.91      No orders of the defined types were placed in this encounter.   called and scheduled appointment with [facility] today at 1:00 pm

## 2019-11-12 NOTE — PROGRESS NOTES
Chief Complaint   Patient presents with    Eye Pain     Reviewed record in preparation for visit and have obtained necessary documentation. Identified pt with two pt identifiers(name and ). Health Maintenance Due   Topic    Pneumococcal 0-64 years (1 of 1 - PPSV23)    Shingrix Vaccine Age 50> (1 of 2)    Influenza Age 5 to Adult     PAP AKA CERVICAL CYTOLOGY          Chief Complaint   Patient presents with    Eye Pain        Wt Readings from Last 3 Encounters:   19 187 lb 3.2 oz (84.9 kg)   19 184 lb 9.6 oz (83.7 kg)   19 186 lb 12.8 oz (84.7 kg)     Temp Readings from Last 3 Encounters:   19 98.8 °F (37.1 °C) (Oral)   19 98 °F (36.7 °C) (Oral)   19 99 °F (37.2 °C) (Oral)     BP Readings from Last 3 Encounters:   19 (!) 166/100   19 140/88   19 140/86     Pulse Readings from Last 3 Encounters:   19 88   19 88   19 99           Learning Assessment:  :     Learning Assessment 2019 5/15/2015 3/25/2014   PRIMARY LEARNER Patient Patient Patient   HIGHEST LEVEL OF EDUCATION - PRIMARY LEARNER  > 4 YEARS OF COLLEGE > 4 YEARS OF COLLEGE 4 YEARS OF COLLEGE   BARRIERS PRIMARY LEARNER NONE NONE NONE   CO-LEARNER CAREGIVER No No No   PRIMARY LANGUAGE ENGLISH ENGLISH ENGLISH    NEED - No No   LEARNER PREFERENCE PRIMARY DEMONSTRATION DEMONSTRATION DEMONSTRATION     - PICTURES READING     - LISTENING LISTENING   LEARNING SPECIAL TOPICS - no no   ANSWERED BY patient  patient patient   RELATIONSHIP SELF SELF SELF       Depression Screening:  :     3 most recent PHQ Screens 2019   Little interest or pleasure in doing things Not at all   Feeling down, depressed, irritable, or hopeless Not at all   Total Score PHQ 2 0       Fall Risk Assessment:  :     Fall Risk Assessment, last 12 mths 2019   Able to walk? Yes   Fall in past 12 months? Yes   Fall with injury?  Yes   Number of falls in past 12 months 1   Fall Risk Score 2 Abuse Screening:  :     Abuse Screening Questionnaire 7/8/2019 8/15/2018 5/15/2015 3/25/2014   Do you ever feel afraid of your partner? N N N N   Are you in a relationship with someone who physically or mentally threatens you? N N N N   Is it safe for you to go home? Y Y Y Y       Coordination of Care Questionnaire:  :     1) Have you been to an emergency room, urgent care clinic since your last visit? no   Hospitalized since your last visit? no             2) Have you seen or consulted any other health care providers outside of [address] [place] [address] since your last visit? no  (Include any pap smears or colon screenings in this section.)    3) Do you have an Advance Directive on file? no    4) Are you interested in receiving information on Advance Directives? NO      Patient is accompanied by I spouse have received verbal consent from Benji Khan to discuss any/all medical information while they are present in the room. Reviewed record  In preparation for visit and have obtained necessary documentation.

## 2019-11-13 RX ORDER — AZELASTINE 1 MG/ML
SPRAY, METERED NASAL
Qty: 1 BOTTLE | Refills: 1 | Status: SHIPPED | OUTPATIENT
Start: 2019-11-13 | End: 2020-11-16 | Stop reason: SDUPTHER

## 2019-12-03 RX ORDER — AZELASTINE 1 MG/ML
SPRAY, METERED NASAL
Qty: 1 BOTTLE | Refills: 1 | Status: SHIPPED | OUTPATIENT
Start: 2019-12-03 | End: 2020-01-09 | Stop reason: SDUPTHER

## 2019-12-19 RX ORDER — AZELASTINE 1 MG/ML
SPRAY, METERED NASAL
Qty: 1 BOTTLE | Refills: 1 | Status: CANCELLED | OUTPATIENT
Start: 2019-12-19

## 2019-12-19 NOTE — TELEPHONE ENCOUNTER
Call to pharmacy and spoke with pharmacist, Paz Dupree. She states to disregard the request. Pt has received 2 prescriptions with refills in the last month. No refills needed at this time. Last office visit - 11/12/2019  Next office visit - No future appointments.       Requested Prescriptions     Pending Prescriptions Disp Refills    azelastine (ASTELIN) 137 mcg (0.1 %) nasal spray 1 Bottle 1     Sig: Use in each nostril as directed

## 2019-12-19 NOTE — TELEPHONE ENCOUNTER
Requested Prescriptions     Pending Prescriptions Disp Refills    azelastine (ASTELIN) 137 mcg (0.1 %) nasal spray 1 Bottle 1     Sig: Use in each nostril as directed     Needs a 90 day supply    [facility]/pharmacy #[id number]- [place], [address]

## 2020-01-09 ENCOUNTER — OFFICE VISIT (OUTPATIENT)
Dept: INTERNAL MEDICINE CLINIC | Age: 65
End: 2020-01-09

## 2020-01-09 VITALS
WEIGHT: 186 LBS | BODY MASS INDEX: 30.99 KG/M2 | TEMPERATURE: 98.2 F | RESPIRATION RATE: 16 BRPM | OXYGEN SATURATION: 98 % | SYSTOLIC BLOOD PRESSURE: 146 MMHG | HEART RATE: 98 BPM | DIASTOLIC BLOOD PRESSURE: 90 MMHG | HEIGHT: 65 IN

## 2020-01-09 DIAGNOSIS — J06.9 VIRAL UPPER RESPIRATORY TRACT INFECTION: Primary | ICD-10-CM

## 2020-01-09 NOTE — PROGRESS NOTES
HISTORY OF PRESENT ILLNESS  Laurie Melendez is a 60 y.o. female. HPI  Patient complains of 4 days of nasal congestion, nasal itching, some sputum production, without shortness of breath or fevers. She has been sleeping well after taking Nyquil She has also used Aleve, Mucinex,  Saline Rinse and Astelin spray. She is eating and drinking well. Patient denies wheezing, headache, sore throat, facial pain or ear pain. She denies myalgias. She is unaware of any known exposures. Past Medical History:   Diagnosis Date    Anxiety     Dermatitis     atypical    Fibrocystic breast     Hemorrhoid     History of kidney stones     Last times was circa 2004.  History of vertigo       Current Outpatient Medications on File Prior to Visit   Medication Sig Dispense Refill    OTHER Tumeric daily      azelastine (ASTELIN) 137 mcg (0.1 %) nasal spray INSTILL 1 SPRAY BY BOTH NOSTRILS ROUTE TWO (2) TIMES A DAY. 1 Bottle 1    albuterol (PROVENTIL HFA, VENTOLIN HFA, PROAIR HFA) 90 mcg/actuation inhaler Take 2 Puffs by inhalation every six (6) hours as needed for Wheezing. 1 Inhaler 0    cholecalciferol (VITAMIN D3) 1,000 unit tablet Take 1,000 Units by mouth daily.  VITAMIN B COMPLEX (B COMPLEX 1 PO) Take 1 Cap by mouth daily.  [DISCONTINUED] azelastine (ASTELIN) 137 mcg (0.1 %) nasal spray INSTILL 1 SPRAY BY BOTH NOSTRILS ROUTE TWO (2) TIMES A DAY. 1 Bottle 1    [DISCONTINUED] methylPREDNISolone (MEDROL DOSEPACK) 4 mg tablet Follow pack instructions 1 Dose Pack 0    fluticasone propionate (FLONASE) 50 mcg/actuation nasal spray 2 Sprays by Both Nostrils route daily. 1 Bottle 0     No current facility-administered medications on file prior to visit. ROS  Per HPI  Physical Exam   Patient is in no apparent distress   HEENT:  conjunctiva clear  Oropharynx: clear.  No erythema, exudate, or drainage  Nose clear drainage present   Sinuses: nontender  Ears: tympanic membrane's and canals normal.  No erythema, fluid, drainage  Neck: no cervical lymphadenopathy  Heart[de-identified] normal rate, regular rhythm, normal S1, S2, no murmurs, rubs, clicks or gallops. Chest: clear to auscultation, no wheezes, rales or rhonchi,   Extremities: no edema    ASSESSMENT and PLAN  Viral URI   Patient provided with URI recommendations/guidelines in after visit summary  Advised to call or return to clinic if these symptoms worsen or fail to improve as anticipated.

## 2020-01-09 NOTE — PROGRESS NOTES
Sinus pressure, thick yellow phlegm, since Sunday. No fever, scratchy throat. Has taken Nyquil & Aleve.

## 2020-01-09 NOTE — LETTER
NOTIFICATION RETURN TO WORK / SCHOOL    1/9/2020 9:42 AM    Ms. Angel Higgins  [address] [address]      To Whom It May Concern:    Angel Higgins is currently under the care of [facility]. She will return to work/school on: 1/13/20    If there are questions or concerns please have the patient contact our office.         Sincerely,      Melchor Sanchez MD

## 2020-01-09 NOTE — PATIENT INSTRUCTIONS
Viral Infection -upper respiratory infection, sinusitis, influenza         - Rest as much as possible and stay home from work/school at least 24 hours                  after last fever              - Wash hand frequently and cough/sneeze into your sleeve to help prevent       infection of others   - Drink plenty of fluids   - Ibuprofen (Advil, Motrin) 400-800mg every 6 hours or                  Aleve 220 mg 1-2 pills every 8 hours for fever, headache, pain   - Tylenol extra strength 500 mg every 6 hours for pain, headache, fever   - Nasal saline rinses 2-3 times daily for nasal congestion   - Mucinex 1200 mg twice daily or Guaifenesin 400 mg every 4 hours for chest       congestion              - Robitussin DM or Delsym for cough   - Cepacol throat losenges and saline gargles (1 tsp salt in 8 oz water) for sore       throat   - Tea with honey for cough         - Pseudophedrine 12-hour tablets twice daily for nasal and inner ear congestion.      -Ask your pharmacist (this is kept behind the counter)     -If you have high blood pressure or heart disease, use this        medication with caution (ask your doctor)   Call or return to clinic if these symptoms worsen or fail to improve as                                  anticipated.

## 2020-01-10 ENCOUNTER — TELEPHONE (OUTPATIENT)
Dept: INTERNAL MEDICINE CLINIC | Age: 65
End: 2020-01-10

## 2020-01-10 NOTE — TELEPHONE ENCOUNTER
Patient has done everything Dr. Nita Wall recommended yesterday at her appointment and she is not feeling any better. Plan:  Discussed with patient that her she will have symptoms of her viral URI for several more days. She is continue with her current regime. Lots of rest and fluids. Can add delsym 2 tsp every 12 hours for cough. Discussed with patient that viral infections have to run its course and the inflammation that it leaves can take some time to heal.     Patient states understanding and agrees with plan.

## 2020-01-10 NOTE — TELEPHONE ENCOUNTER
Returned call to Kami England - verified self and birth date. Pt reports that she is \"very congested\", nasal passages are \"very burny and stuffed up\". She reports being in the bed all day with a productive cough & yellow phlegm. Denies fevers or chills. Using saline nasal rinse, astelin, hot tea, mucinex, & decongestant since Sunday with no improvement. Pt is requesting a medication be called into her local pharmacy.

## 2020-01-10 NOTE — TELEPHONE ENCOUNTER
[phone]      Please call in a antibiotic she is worst today.  She is coughing and has colored mucus

## 2020-07-09 ENCOUNTER — TELEPHONE (OUTPATIENT)
Dept: INTERNAL MEDICINE CLINIC | Age: 65
End: 2020-07-09

## 2020-07-14 ENCOUNTER — OFFICE VISIT (OUTPATIENT)
Dept: INTERNAL MEDICINE CLINIC | Age: 65
End: 2020-07-14

## 2020-07-14 VITALS
WEIGHT: 190 LBS | OXYGEN SATURATION: 98 % | DIASTOLIC BLOOD PRESSURE: 84 MMHG | HEART RATE: 84 BPM | RESPIRATION RATE: 18 BRPM | TEMPERATURE: 98 F | SYSTOLIC BLOOD PRESSURE: 140 MMHG | BODY MASS INDEX: 30.53 KG/M2 | HEIGHT: 66 IN

## 2020-07-14 DIAGNOSIS — J30.9 ALLERGIC RHINITIS, UNSPECIFIED SEASONALITY, UNSPECIFIED TRIGGER: ICD-10-CM

## 2020-07-14 DIAGNOSIS — L73.9 FOLLICULITIS OF PERINEUM: Primary | ICD-10-CM

## 2020-07-14 NOTE — PROGRESS NOTES
59 yo female reports she \"probably should have canceled today's appointment\". She had an infected hair follicle in R supra pubic area. She was seen at [facility], and treated oral antibiotic and hot compresses. Then another occurred on L buttock. She used compresses, and this one seems to be resolving. This is not a recurrent problem for her. Her nasal stuffiness is constant and seems to respond only to Afrin NS which she uses sparingly. She is using Astelin NS once daily, but Flonase only occas. She feels agitated with the anti-decongestant she has been taking. Is flying tomorrow to [facility] for a friend's 52[facility] BD. PE: Overweight WF with obviously stuffy nose   T = 98   BP = 140/84   R Suprapubic region - healed spot with palpable thickness, but no redness   L gluteal region - similar appearing small spot - not open, red or draining    Imp: Folliculitis   Allergic rhinitis    Plan: Good hydration   Suggested oral anti-histamine on one end the day, and steroid NS on other end   Astelin once daily  _____________________________  Expected course of current diagnosed problem(s) as well as expected progression and possible complications, and desired follow up with provider are discussed with patient. Patient is encouraged to be back in touch with any questions or concerns. Patient expresses understanding of plan of care. Patient is given AVS which includes diagnoses, current medications, vitals.

## 2020-07-14 NOTE — PATIENT INSTRUCTIONS
Schedule for nasal allergies:  Use Astelin nasal spray once daily  Take Zyrtec/Cetirizine 10 mg OR Allegra/Fexofenidine 180 mg ONCE daily in PM - DO NOT TAKE DECONGESTANTS Due to side effects  Flonase/Fluticasone OR Nasacort steroid nasal spray DAILY (either end of the day)  Afrin will be your \"break glass for emergency\" nasal decongestant spray    If infected hair follicle situation recurs, use hot compresses as soon as the problem starts

## 2020-07-14 NOTE — PROGRESS NOTES
Room:     Identified pt with two pt identifiers(name and ). Reviewed record in preparation for visit and have obtained necessary documentation. All patient medications has been reviewed. Chief Complaint   Patient presents with    Cyst    Sinus Pain       Health Maintenance Due   Topic    Pneumococcal 0-64 years (1 of 1 - PPSV23)    Lipid Screen     Shingrix Vaccine Age 50> (1 of 2)    PAP AKA CERVICAL CYTOLOGY        Vitals:    20 0932   BP: 140/84   Pulse: 84   Resp: 18   Temp: 98 °F (36.7 °C)   TempSrc: Temporal   SpO2: 98%   Weight: 190 lb (86.2 kg)   Height: 5' 6\" (1.676 m)   PainSc:   0 - No pain   LMP: 10/28/2002       1. Have you traveled outside of the [address] [place] [address] in the last month No    2. Have you been in close contact with someone who is suspected to have COVID-19 or has tested positive. No    3. Do you have any signs or symptoms. ? 4. Have you been to the ER, urgent care clinic since your last visit? Hospitalized since your last visit? No    5. Have you seen or consulted any other health care providers outside of the [address] [place] [address] since your last visit? Include any pap smears or colon screening. No    7. Are you fasting for blood work today? NO    8. Do you have an Advanced Directive/ Living Will in place? NO  If yes, do we have a copy on file NO  If no, would you like information NO    Patient is accompanied by self I have received verbal consent from Valery Green to discuss any/all medical information while they are present in the room.

## 2020-09-15 ENCOUNTER — VIRTUAL VISIT (OUTPATIENT)
Dept: INTERNAL MEDICINE CLINIC | Age: 65
End: 2020-09-15
Payer: COMMERCIAL

## 2020-09-15 DIAGNOSIS — R11.0 NAUSEA: ICD-10-CM

## 2020-09-15 DIAGNOSIS — M54.6 ACUTE BILATERAL THORACIC BACK PAIN: Primary | ICD-10-CM

## 2020-09-15 PROCEDURE — 99441 PR PHYS/QHP TELEPHONE EVALUATION 5-10 MIN: CPT | Performed by: NURSE PRACTITIONER

## 2020-09-15 NOTE — PATIENT INSTRUCTIONS
Since this was a work related possible exposure, she will call her employer about how to proceed with COVID testing.

## 2020-09-15 NOTE — PROGRESS NOTES
Kartik Alexandra is a 63 y.o. female evaluated via telephone on 9/15/2020. She has a laptop which is not working well. Assessment & Plan:     Diagnoses and all orders for this visit:    1. Acute bilateral thoracic back pain    2. Nausea      Follow-up and Dispositions    · Return in about 4 months (around 1/25/2021) for as scheduled with Dr. Reyna Galarza.         Subjective:     64 yo female awoke 3 nights ago with pain in bottoms of both lungs with a deep breath. She is working in homes with clients; one of them sneezed when without a mask, although he did not appear to be sick (mentally ill). She is now fearful of being exposed to [email or web address]. The next AM, she had the same, but today it is not present. She does have nausea today and awoke with it today, but no vomiting. No feverishness or cough. She flew in late July. She is employed by [employer]. Plan:  I recommended she contact her employer about getting a COVID test  ________________________________  Expected course of current diagnosed problem(s) as well as expected progression and possible complications, and desired follow up with provider are discussed with patient. Patient is encouraged to be back in touch with any questions or concerns. Patient expresses understanding of plan of care. The following sections were reviewed and/or updated:     Current Outpatient Medications:     OTHER, Tumeric daily, Disp: , Rfl:     cholecalciferol (VITAMIN D3) 1,000 unit tablet, Take 1,000 Units by mouth daily. , Disp: , Rfl:     VITAMIN B COMPLEX (B COMPLEX 1 PO), Take 1 Cap by mouth daily. , Disp: , Rfl:     azelastine (ASTELIN) 137 mcg (0.1 %) nasal spray, INSTILL 1 SPRAY BY BOTH NOSTRILS ROUTE TWO (2) TIMES A DAY., Disp: 1 Bottle, Rfl: 1    albuterol (PROVENTIL HFA, VENTOLIN HFA, PROAIR HFA) 90 mcg/actuation inhaler, Take 2 Puffs by inhalation every six (6) hours as needed for Wheezing., Disp: 1 Inhaler, Rfl: 0    fluticasone propionate (FLONASE) 50 mcg/actuation nasal spray, 2 Sprays by Both Nostrils route daily. , Disp: 1 Bottle, Rfl: 0      Consent:  Eliana Fernández, who was seen by synchronous (real-time) audio only technology, and/or her healthcare decision maker, is aware that this patient-initiated, Telehealth encounter on 9/15/2020 is a billable service. She is aware that she may receive a bill for any such additional services and has provided verbal consent to proceed: Yes. Patient identification was verified prior to start of the visit. A caregiver was present when appropriate. Due to this being a TeleHealth encounter (during [facility] emergency), evaluation of the following organ systems was limited: VS/Constitutional/EENT/Resp/CV/GI//MS/Neuro/Skin/Heme-Lymph-Imm. Pursuant to the emergency declaration under the [address] [place] [address], [facility] waiver authority and the [employer] Act, this Virtual Visit was conducted, with patient's (and/or legal guardian's) consent, to reduce the patient's risk of exposure to COVID-19 and provide necessary medical care. Services were provided through a synchronous discussion virtually to substitute for in-person clinic visit. I was at home. The patient was at home. I affirm this is a Patient Initiated Episode with an Established Patient who has not had a related appointment within my department in the past 7 days or scheduled within the next 24 hours.     Total Time: minutes: 5-10 minutes    Note: not billable if this call serves to triage the patient into an appointment for the relevant concern    Gabriel Medina NP

## 2020-11-11 ENCOUNTER — HOSPITAL ENCOUNTER (OUTPATIENT)
Age: 65
Setting detail: OBSERVATION
Discharge: HOME | End: 2020-11-12
Attending: EMERGENCY MEDICINE | Admitting: FAMILY MEDICINE
Payer: COMMERCIAL

## 2020-11-11 ENCOUNTER — OFFICE VISIT (OUTPATIENT)
Dept: INTERNAL MEDICINE CLINIC | Age: 65
End: 2020-11-11

## 2020-11-11 ENCOUNTER — APPOINTMENT (OUTPATIENT)
Dept: CT IMAGING | Age: 65
End: 2020-11-11
Attending: EMERGENCY MEDICINE
Payer: COMMERCIAL

## 2020-11-11 VITALS
HEIGHT: 66 IN | TEMPERATURE: 97.8 F | RESPIRATION RATE: 16 BRPM | OXYGEN SATURATION: 97 % | HEART RATE: 80 BPM | DIASTOLIC BLOOD PRESSURE: 93 MMHG | WEIGHT: 190.6 LBS | SYSTOLIC BLOOD PRESSURE: 143 MMHG | BODY MASS INDEX: 30.63 KG/M2

## 2020-11-11 DIAGNOSIS — R47.9 DIFFICULTY WITH SPEECH: ICD-10-CM

## 2020-11-11 DIAGNOSIS — R51.9 NONINTRACTABLE EPISODIC HEADACHE, UNSPECIFIED HEADACHE TYPE: Primary | ICD-10-CM

## 2020-11-11 DIAGNOSIS — R51.9 NONINTRACTABLE HEADACHE, UNSPECIFIED CHRONICITY PATTERN, UNSPECIFIED HEADACHE TYPE: ICD-10-CM

## 2020-11-11 DIAGNOSIS — R26.9 GAIT DISTURBANCE: ICD-10-CM

## 2020-11-11 DIAGNOSIS — R47.01 EXPRESSIVE APHASIA: Primary | ICD-10-CM

## 2020-11-11 PROBLEM — G45.9 TIA (TRANSIENT ISCHEMIC ATTACK): Status: ACTIVE | Noted: 2020-11-11

## 2020-11-11 LAB
ALBUMIN SERPL-MCNC: 3.9 G/DL (ref 3.5–5)
ALBUMIN/GLOB SERPL: 1 {RATIO} (ref 1.1–2.2)
ALP SERPL-CCNC: 89 U/L (ref 45–117)
ALT SERPL-CCNC: 45 U/L (ref 12–78)
ANION GAP SERPL CALC-SCNC: 5 MMOL/L (ref 5–15)
AST SERPL-CCNC: 26 U/L (ref 15–37)
ATRIAL RATE: 81 BPM
BASOPHILS # BLD: 0.1 K/UL (ref 0–0.1)
BASOPHILS NFR BLD: 1 % (ref 0–1)
BILIRUB SERPL-MCNC: 0.5 MG/DL (ref 0.2–1)
BUN SERPL-MCNC: 13 MG/DL (ref 6–20)
BUN/CREAT SERPL: 19 (ref 12–20)
CALCIUM SERPL-MCNC: 9.3 MG/DL (ref 8.5–10.1)
CALCULATED P AXIS, ECG09: 66 DEGREES
CALCULATED R AXIS, ECG10: 62 DEGREES
CALCULATED T AXIS, ECG11: 69 DEGREES
CHLORIDE SERPL-SCNC: 104 MMOL/L (ref 97–108)
CO2 SERPL-SCNC: 27 MMOL/L (ref 21–32)
COMMENT, HOLDF: NORMAL
CREAT SERPL-MCNC: 0.67 MG/DL (ref 0.55–1.02)
DIAGNOSIS, 93000: NORMAL
DIFFERENTIAL METHOD BLD: NORMAL
EOSINOPHIL # BLD: 0.1 K/UL (ref 0–0.4)
EOSINOPHIL NFR BLD: 2 % (ref 0–7)
ERYTHROCYTE [DISTWIDTH] IN BLOOD BY AUTOMATED COUNT: 12.1 % (ref 11.5–14.5)
GLOBULIN SER CALC-MCNC: 4 G/DL (ref 2–4)
GLUCOSE SERPL-MCNC: 102 MG/DL (ref 65–100)
HCT VFR BLD AUTO: 45.5 % (ref 35–47)
HGB BLD-MCNC: 15.9 G/DL (ref 11.5–16)
IMM GRANULOCYTES # BLD AUTO: 0 K/UL (ref 0–0.04)
IMM GRANULOCYTES NFR BLD AUTO: 0 % (ref 0–0.5)
LYMPHOCYTES # BLD: 2.5 K/UL (ref 0.8–3.5)
LYMPHOCYTES NFR BLD: 36 % (ref 12–49)
MCH RBC QN AUTO: 32.9 PG (ref 26–34)
MCHC RBC AUTO-ENTMCNC: 34.9 G/DL (ref 30–36.5)
MCV RBC AUTO: 94.2 FL (ref 80–99)
MONOCYTES # BLD: 0.6 K/UL (ref 0–1)
MONOCYTES NFR BLD: 9 % (ref 5–13)
NEUTS SEG # BLD: 3.7 K/UL (ref 1.8–8)
NEUTS SEG NFR BLD: 52 % (ref 32–75)
NRBC # BLD: 0 K/UL (ref 0–0.01)
NRBC BLD-RTO: 0 PER 100 WBC
P-R INTERVAL, ECG05: 148 MS
PLATELET # BLD AUTO: 266 K/UL (ref 150–400)
PMV BLD AUTO: 9 FL (ref 8.9–12.9)
POTASSIUM SERPL-SCNC: 3.8 MMOL/L (ref 3.5–5.1)
PROT SERPL-MCNC: 7.9 G/DL (ref 6.4–8.2)
Q-T INTERVAL, ECG07: 390 MS
QRS DURATION, ECG06: 92 MS
QTC CALCULATION (BEZET), ECG08: 453 MS
RBC # BLD AUTO: 4.83 M/UL (ref 3.8–5.2)
SAMPLES BEING HELD,HOLD: NORMAL
SODIUM SERPL-SCNC: 136 MMOL/L (ref 136–145)
VENTRICULAR RATE, ECG03: 81 BPM
WBC # BLD AUTO: 7 K/UL (ref 3.6–11)

## 2020-11-11 PROCEDURE — 99218 HC RM OBSERVATION: CPT

## 2020-11-11 PROCEDURE — 74011250637 HC RX REV CODE- 250/637: Performed by: EMERGENCY MEDICINE

## 2020-11-11 PROCEDURE — 80053 COMPREHEN METABOLIC PANEL: CPT

## 2020-11-11 PROCEDURE — 85025 COMPLETE CBC W/AUTO DIFF WBC: CPT

## 2020-11-11 PROCEDURE — 74011250637 HC RX REV CODE- 250/637: Performed by: FAMILY MEDICINE

## 2020-11-11 PROCEDURE — 99283 EMERGENCY DEPT VISIT LOW MDM: CPT

## 2020-11-11 PROCEDURE — 70450 CT HEAD/BRAIN W/O DYE: CPT

## 2020-11-11 PROCEDURE — 74011250636 HC RX REV CODE- 250/636: Performed by: FAMILY MEDICINE

## 2020-11-11 PROCEDURE — 36415 COLL VENOUS BLD VENIPUNCTURE: CPT

## 2020-11-11 PROCEDURE — 93005 ELECTROCARDIOGRAM TRACING: CPT

## 2020-11-11 RX ORDER — GUAIFENESIN 100 MG/5ML
81 LIQUID (ML) ORAL DAILY
Status: DISCONTINUED | OUTPATIENT
Start: 2020-11-12 | End: 2020-11-12

## 2020-11-11 RX ORDER — FAMOTIDINE 20 MG/1
20 TABLET, FILM COATED ORAL EVERY 12 HOURS
Status: DISCONTINUED | OUTPATIENT
Start: 2020-11-11 | End: 2020-11-12 | Stop reason: HOSPADM

## 2020-11-11 RX ORDER — BUTALBITAL, ACETAMINOPHEN AND CAFFEINE 50; 325; 40 MG/1; MG/1; MG/1
1 TABLET ORAL
Status: DISCONTINUED | OUTPATIENT
Start: 2020-11-11 | End: 2020-11-12 | Stop reason: HOSPADM

## 2020-11-11 RX ORDER — LORAZEPAM 0.5 MG/1
1 TABLET ORAL
Status: COMPLETED | OUTPATIENT
Start: 2020-11-11 | End: 2020-11-11

## 2020-11-11 RX ORDER — THERA TABS 400 MCG
1 TAB ORAL DAILY
COMMUNITY
End: 2021-01-25 | Stop reason: ALTCHOICE

## 2020-11-11 RX ORDER — ACETAMINOPHEN 650 MG/1
650 SUPPOSITORY RECTAL
Status: DISCONTINUED | OUTPATIENT
Start: 2020-11-11 | End: 2020-11-12 | Stop reason: HOSPADM

## 2020-11-11 RX ORDER — ATORVASTATIN CALCIUM 40 MG/1
80 TABLET, FILM COATED ORAL
Status: DISCONTINUED | OUTPATIENT
Start: 2020-11-11 | End: 2020-11-12

## 2020-11-11 RX ORDER — SODIUM CHLORIDE 9 MG/ML
75 INJECTION, SOLUTION INTRAVENOUS CONTINUOUS
Status: DISCONTINUED | OUTPATIENT
Start: 2020-11-11 | End: 2020-11-12 | Stop reason: HOSPADM

## 2020-11-11 RX ORDER — ACETAMINOPHEN 325 MG/1
650 TABLET ORAL
Status: DISCONTINUED | OUTPATIENT
Start: 2020-11-11 | End: 2020-11-12 | Stop reason: HOSPADM

## 2020-11-11 RX ADMIN — LORAZEPAM 1 MG: 0.5 TABLET ORAL at 19:39

## 2020-11-11 RX ADMIN — ATORVASTATIN CALCIUM 80 MG: 40 TABLET, FILM COATED ORAL at 22:30

## 2020-11-11 RX ADMIN — SODIUM CHLORIDE 75 ML/HR: 900 INJECTION, SOLUTION INTRAVENOUS at 22:30

## 2020-11-11 RX ADMIN — FAMOTIDINE 20 MG: 20 TABLET ORAL at 22:30

## 2020-11-11 RX ADMIN — BUTALBITAL, ACETAMINOPHEN, AND CAFFEINE 1 TABLET: 50; 325; 40 TABLET ORAL at 19:39

## 2020-11-11 NOTE — H&P
History & Physical    Primary Care Provider: Felix Silver MD  Source of Information: Patient     History of Presenting Illness:   Annemarie Montana is a 63 y.o. female who presents with headache    History is primary obtained from the patient, patient reports that she has persistent headache in the right occipital region that has been going on for the last 2 weeks. Per chart review patient has also had some intermittent confusion and some dysarthria associated with her symptoms. Patient works as a  for [address] [place] [address] I Had Cancer and is under a great deal of stress. Patient has had trouble getting her words out and has had trouble thinking clearly. Per ER physician patient has had episodes which she has had been leaning on the left side when she walks. Patient came to the ER today and was requested to be observed under the hospitalist service    Denies any other complaints or problems    The patient denies any Headache, blurry vision, sore throat, trouble swallowing, trouble with speech, chest pain, SOB, cough, fever, chills, N/V/D, abd pain, urinary symptoms, constipation, recent travels, sick contacts,,  falls, injuries, rashes, contact with COVID-19 diagnosed patients, hematemesis, melena, hemoptysis, hematuria, rashes, denies starting any new medications and denies any other concerns or problems besides as mentioned above. Review of Systems:  A comprehensive review of systems was negative except for that written in the History of Present Illness. Past Medical History:   Diagnosis Date    Anxiety     Dermatitis     atypical    Fibrocystic breast     Hemorrhoid     History of kidney stones     Last times was circa .    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 Prior to Admission medications    Medication Sig Start Date End Date Taking? Authorizing Provider   therapeutic multivitamin [facility]) tablet Take 1 Tab by mouth daily. Yes Provider, Historical   azelastine (ASTELIN) 137 mcg (0.1 %) nasal spray INSTILL 1 SPRAY BY BOTH NOSTRILS ROUTE TWO (2) TIMES A DAY. 11/13/19  Yes Noemi Alpers D, NP   fluticasone propionate (FLONASE) 50 mcg/actuation nasal spray 2 Sprays by Both Nostrils route daily. 9/20/19  Yes Noemi Alpers D, NP   cholecalciferol (VITAMIN D3) 1,000 unit tablet Take 1,000 Units by mouth daily. Yes Provider, Historical   VITAMIN B COMPLEX (B COMPLEX 1 PO) Take 1 Cap by mouth daily.    Yes Provider, Historical     Allergies   Allergen Reactions    Sulfa (Sulfonamide Antibiotics) Hives     1 week after starting Bactrim - Feb 2016    Clindamycin Rash    Keflex [Cephalexin] Rash    Morphine Other (comments)     Dysphoria/anxiety    Pcn [Penicillins] Rash      Family History   Problem Relation Age of Onset    Arthritis-osteo Mother     Cancer Father         lung -heavy smoker    Cancer Maternal Aunt         breast    Breast Cancer Maternal Aunt     Breast Cancer Maternal Aunt         SOCIAL HISTORY:  Patient resides:  Independently x   Assisted Living    SNF    With family care       Smoking history:   None    Former    Chronic x     Alcohol history:   None    Social x   Chronic      Ambulates:   Independently x   w/cane    w/walker    w/wc    CODE STATUS:  DNR    Full x   Other      Objective:     Physical Exam:     Visit Vitals  BP (!) 175/110   Pulse 80   Temp 97.4 °F (36.3 °C)   Resp 16   LMP 10/28/2002 (Exact Date) Comment: age 54   SpO2 99%      O2 Device: Room air    General : alert x 3, awake, moderately distressed, pleasant female, appears to be stated age  [de-identified]: PEERL, EOMI, moist mucus membrane, TM clear  Neck: supple, no JVD, no meningeal signs  Chest: Clear to auscultation bilaterally   CVS: S1 S2 heard, Capillary refill less than 2 seconds  Abd: soft/ Non tender, non distended, BS physiological,   Ext: no clubbing, no cyanosis, no edema, brisk 2+ DP pulses  Neuro/Psych: pleasant mood and affect, CN 2-12 grossly intact, sensory grossly within normal limit, Strength 5/5 in all extremities, DTR 1+ x 4  Skin: warm    EKG: Normal sinus rhythm with nonspecific ST changes      Data Review:     Recent Days:  Recent Labs     11/11/20  1131   WBC 7.0   HGB 15.9   HCT 45.5        Recent Labs     11/11/20  1131      K 3.8      CO2 27   *   BUN 13   CREA 0.67   CA 9.3   ALB 3.9   ALT 45     No results for input(s): PH, PCO2, PO2, HCO3, FIO2 in the last 72 hours.     24 Hour Results:  Recent Results (from the past 24 hour(s))   EKG, 12 LEAD, INITIAL    Collection Time: 11/11/20 11:26 AM   Result Value Ref Range    Ventricular Rate 81 BPM    Atrial Rate 81 BPM    P-R Interval 148 ms    QRS Duration 92 ms    Q-T Interval 390 ms    QTC Calculation (Bezet) 453 ms    Calculated P Axis 66 degrees    Calculated R Axis 62 degrees    Calculated T Axis 69 degrees    Diagnosis       Normal sinus rhythm  When compared with ECG of 08-JAN-2015 12:43,  No significant change was found  Confirmed by Penelope Cannon MD, Kaya Be ([id number]) on 11/11/2020 5:37:41 PM     CBC WITH AUTOMATED DIFF    Collection Time: 11/11/20 11:31 AM   Result Value Ref Range    WBC 7.0 3.6 - 11.0 K/uL    RBC 4.83 3.80 - 5.20 M/uL    HGB 15.9 11.5 - 16.0 g/dL    HCT 45.5 35.0 - 47.0 %    MCV 94.2 80.0 - 99.0 FL    MCH 32.9 26.0 - 34.0 PG    MCHC 34.9 30.0 - 36.5 g/dL    RDW 12.1 11.5 - 14.5 %    PLATELET 013 423 - 252 K/uL    MPV 9.0 8.9 - 12.9 FL    NRBC 0.0 0  WBC    ABSOLUTE NRBC 0.00 0.00 - 0.01 K/uL    NEUTROPHILS 52 32 - 75 %    LYMPHOCYTES 36 12 - 49 %    MONOCYTES 9 5 - 13 %    EOSINOPHILS 2 0 - 7 %    BASOPHILS 1 0 - 1 %    IMMATURE GRANULOCYTES 0 0.0 - 0.5 %    ABS. NEUTROPHILS 3.7 1.8 - 8.0 K/UL    ABS. LYMPHOCYTES 2.5 0.8 - 3.5 K/UL    ABS. MONOCYTES 0.6 0.0 - 1.0 K/UL    ABS. EOSINOPHILS 0.1 0.0 - 0.4 K/UL    ABS. BASOPHILS 0.1 0.0 - 0.1 K/UL    ABS. IMM. GRANS. 0.0 0.00 - 0.04 K/UL    DF AUTOMATED     METABOLIC PANEL, COMPREHENSIVE    Collection Time: 11/11/20 11:31 AM   Result Value Ref Range    Sodium 136 136 - 145 mmol/L    Potassium 3.8 3.5 - 5.1 mmol/L    Chloride 104 97 - 108 mmol/L    CO2 27 21 - 32 mmol/L    Anion gap 5 5 - 15 mmol/L    Glucose 102 (H) 65 - 100 mg/dL    BUN 13 6 - 20 MG/DL    Creatinine 0.67 0.55 - 1.02 MG/DL    BUN/Creatinine ratio 19 12 - 20      GFR est AA >60 >60 ml/min/1.73m2    GFR est non-AA >60 >60 ml/min/1.73m2    Calcium 9.3 8.5 - 10.1 MG/DL    Bilirubin, total 0.5 0.2 - 1.0 MG/DL    ALT (SGPT) 45 12 - 78 U/L    AST (SGOT) 26 15 - 37 U/L    Alk. phosphatase 89 45 - 117 U/L    Protein, total 7.9 6.4 - 8.2 g/dL    Albumin 3.9 3.5 - 5.0 g/dL    Globulin 4.0 2.0 - 4.0 g/dL    A-G Ratio 1.0 (L) 1.1 - 2.2     SAMPLES BEING HELD    Collection Time: 11/11/20 11:31 AM   Result Value Ref Range    SAMPLES BEING HELD 1RED,1BLU     COMMENT        Add-on orders for these samples will be processed based on acceptable specimen integrity and analyte stability, which may vary by analyte. Imaging:   Ct Head Wo Cont    Result Date: 11/11/2020  IMPRESSION: No acute intracranial hemorrhage, mass or infarct.       Assessment/Plan      Dysarthria/altered mental status: Patient will be observed on a telemetry bed, rule out CVA, MRI of the brain, neurology consult, aspirin, statin, echocardiogram, PT/OT/consult, limited monitoring, supportive care, TSH, B12, folate , UA and UDS, await neurology input and continue to monitor, further intervention per hospital course, reassess as needed    Right occipital headache: Unclear etiology, cervicalgia versus uncontrolled hypertension versus other etiology, MRI of the brain is pending, could be atypical migraine, will start patient on Fioricet, analgesia, pain control, neurology input, ESR CRP, further intervention per hospital course, reassess as needed, monitor    Hypertension: Poorly controlled, optimize blood pressure control, supportive care, close monitoring, further intervention per hospital course, reassess as needed    Tobacco abuse: Patient was counseled    GI DVT prophylaxis: Patient will be on heparin                       Signed By: Deneen Maciel MD     November 11, 2020

## 2020-11-11 NOTE — ED NOTES
I have just arrived for my shift this patient has been in the waiting room for an extended period of time. Labs and CT are negative. There is no where for me to examine this patient. I have asked the charge nurse and they are attempting to find a space for me to see the patient.

## 2020-11-11 NOTE — ED PROVIDER NOTES
This is a 61-year-old female with a history of kidney stones, vertigo and anxiety. She presents from Dr. Josr Avilez office with a history of 2 weeks of intermittent pain in the posterior aspect of the right side of her head, as well as intermittent confusion. She works as a  for [address] [place] [address] and is under a great deal of stress. Over the last 2 weeks she has had these intermittent pains and gets to a point where she has trouble getting her words a or thinking clearly. The same symptoms occurred today and she had to go home. The  states that when she walked and she was leaning to the left and having a difficult time finding the right words and getting them out. She again continued to have pain in the right posterior aspect of her head. Denies any weakness or numbness and no tingling. She denies any fever or chills, sweats, nausea or vomiting and no GI or  symptoms. She was sent here for further evaluation of possible stroke. Past Medical History:   Diagnosis Date    Anxiety     Dermatitis     atypical    Fibrocystic breast     Hemorrhoid     History of kidney stones     Last times was circa . 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          Family History:   Problem Relation Age of Onset    Arthritis-osteo Mother     Cancer Father         lung -heavy smoker    Cancer Maternal Aunt         breast    Breast Cancer Maternal Aunt     Breast Cancer Maternal Aunt        Social History     Socioeconomic History    Marital status:      Spouse name: Not on file    Number of children: Not on file    Years of education: Not on file    Highest education level: Not on file   Occupational History    Not on file   Social Needs    Financial resource strain: Not on file    Food insecurity     Worry: Not on file     Inability: Not on file    Transportation needs     Medical: Not on file     Non-medical: Not on file   Tobacco Use    Smoking status: Current Every Day Smoker     Packs/day: 0.50     Years: 35.00     Pack years: 17.50     Types: Cigarettes    Smokeless tobacco: Never Used   Substance and Sexual Activity    Alcohol use: Yes     Alcohol/week: 3.3 standard drinks     Types: 4 Glasses of wine per week    Drug use: No    Sexual activity: Yes     Partners: Male   Lifestyle    Physical activity     Days per week: Not on file     Minutes per session: Not on file    Stress: Not on file   Relationships    Social connections     Talks on phone: Not on file     Gets together: Not on file     Attends Jew service: Not on file     Active member of club or organization: Not on file     Attends meetings of clubs or organizations: Not on file     Relationship status: Not on file    Intimate partner violence     Fear of current or ex partner: Not on file     Emotionally abused: Not on file     Physically abused: Not on file     Forced sexual activity: Not on file   Other Topics Concern    Not on file   Social History Narrative    Not on file         ALLERGIES: Sulfa (sulfonamide antibiotics); Clindamycin; Keflex [cephalexin]; Morphine; and Pcn [penicillins]    Review of Systems   Constitutional: Negative for activity change, appetite change and fatigue. HENT: Negative for ear pain, facial swelling, sore throat and trouble swallowing. Eyes: Negative for pain, discharge and visual disturbance. Respiratory: Negative for chest tightness, shortness of breath and wheezing. Cardiovascular: Negative for chest pain and palpitations. Gastrointestinal: Negative for abdominal pain, blood in stool, nausea and vomiting.    Genitourinary: Negative for difficulty urinating, flank pain and hematuria. Musculoskeletal: Negative for arthralgias, joint swelling, myalgias and neck pain. Skin: Negative for color change and rash. Neurological: Positive for headaches. Negative for dizziness, weakness and numbness. Difficulty getting words out. Hematological: Negative for adenopathy. Does not bruise/bleed easily. Psychiatric/Behavioral: Positive for confusion. Negative for behavioral problems and sleep disturbance. All other systems reviewed and are negative. Vitals:    11/11/20 1121   BP: (!) 175/110   Pulse: 80   Resp: 16   Temp: 97.4 °F (36.3 °C)   SpO2: 99%            Physical Exam  Vitals signs and nursing note reviewed. Constitutional:       General: She is not in acute distress. Appearance: She is well-developed. HENT:      Head: Normocephalic and atraumatic. Nose: Nose normal.   Eyes:      General: No scleral icterus. Conjunctiva/sclera: Conjunctivae normal.      Pupils: Pupils are equal, round, and reactive to light. Neck:      Musculoskeletal: Normal range of motion and neck supple. No neck rigidity. Thyroid: No thyromegaly. Vascular: No JVD. Trachea: No tracheal deviation. Comments: No carotid bruits noted. There is some localized tenderness in the high posterior neck on the right. Tends to reproduce some of the patient's symptoms of discomfort. Cardiovascular:      Rate and Rhythm: Normal rate and regular rhythm. Heart sounds: Normal heart sounds. No murmur. No friction rub. No gallop. Pulmonary:      Effort: Pulmonary effort is normal. No respiratory distress. Breath sounds: Normal breath sounds. No wheezing or rales. Chest:      Chest wall: No tenderness. Abdominal:      General: Bowel sounds are normal. There is no distension. Palpations: Abdomen is soft. There is no mass. Tenderness: There is no abdominal tenderness. There is no guarding or rebound. Musculoskeletal: Normal range of motion. General: No tenderness. Comments: There is some tenderness to palpation over the right occipital to parietal lobe of the skull. Pressure in just the right spot also helps this discomfort disappear there is no focal weakness or numbness noted. Lymphadenopathy:      Cervical: No cervical adenopathy. Skin:     General: Skin is warm and dry. Findings: No erythema or rash. Neurological:      General: No focal deficit present. Mental Status: She is alert and oriented to person, place, and time. Cranial Nerves: No cranial nerve deficit. Coordination: Coordination normal.      Deep Tendon Reflexes: Reflexes are normal and symmetric. Comments: Unable to get all words out clearly and is somewhat confused. Psychiatric:         Behavior: Behavior normal.         Thought Content: Thought content normal.         Judgment: Judgment normal.      Comments: Patient is quite anxious. Patient's thought process is somewhat disjointed. MDM  Number of Diagnoses or Management Options  Expressive aphasia: new and requires workup  Nonintractable headache, unspecified chronicity pattern, unspecified headache type: new and requires workup     Amount and/or Complexity of Data Reviewed  Clinical lab tests: reviewed and ordered  Tests in the radiology section of CPT®: ordered and reviewed  Decide to obtain previous medical records or to obtain history from someone other than the patient: yes  Review and summarize past medical records: yes  Discuss the patient with other providers: yes  Independent visualization of images, tracings, or specimens: yes    Risk of Complications, Morbidity, and/or Mortality  Presenting problems: high  Diagnostic procedures: high  Management options: high    Patient Progress  Patient progress: stable         Procedures    Labs and CT appear unremarkable.   I have spoken with Dr. Jean Pierre Hall who is concerned as well of the patient's presentation. She states that even watching her walking she was not right, with the patient being normally anxious, she says this is a distinctly different presentation for her. Definitely was having difficulty getting words out and wondered whether she was having some form of TIA. I will go ahead and consult the hospitalist for possible admission. Perfect Serve Consult for Admission  4:02 PM    ED Room Number: Room/bed info not found  Patient Name and age:  Sloane Bradshaw 63 y.o.  female  Working Diagnosis:   1. Expressive aphasia    2. Nonintractable headache, unspecified chronicity pattern, unspecified headache type        COVID-19 Suspicion:  no  Sepsis present:  no  Reassessment needed: no  Code Status:  Full Code  Readmission: no  Isolation Requirements:  no  Recommended Level of Care:  telemetry  Department:[facility] Adult ED - 21   Other: The hospitalist is having a difficult time seeing the patient in the waiting room. The patient's not showing up on his board. Nursing is aware of this and without that capability, he is unable to put in the orders for admission. They are attempting administratively to resolve this problem. 4:49 PM.

## 2020-11-11 NOTE — PROGRESS NOTES
Admission Medication Reconciliation:          Spoke with  Buster Degroot) by telephone @ [phone], unable to speak with patient face to face at this time due to general isolation precautions in the ED related to COVID-19 pandemic.  is a reliable historian. RX query is available at this time. Interview included questions regarding use of:  PTA medications including prescription/OTC, vitamins, supplements, inhaled, topical, injectable, otic and ophthalmic medications  Alcohol, nicotine products, THC and related compounds, illicit drugs, stimulant use (i.e., Red Bull), agents used to assist with sleep and/or pain control issues, and whether patient uses other people's medications of any kind    Notes (per ):  ETOH: 2-3 \"large\" glasses of Chardonnay nightly  Nicotine use: 1 pack daily      Thank you for allowing me to participate in the care of your patient. Marcel Rodgers PharmD, RN # [phone]       [facility] pharmacy benefit data reflects medications filled and processed through the patient's insurance, however   this data does NOT capture whether the medication was picked up or is currently being taken by the patient. Allergies:  Sulfa (sulfonamide antibiotics); Clindamycin; Keflex [cephalexin]; Morphine; and Pcn [penicillins]    Significant PMH/Disease States:   Past Medical History:   Diagnosis Date    Anxiety     Dermatitis     atypical    Fibrocystic breast     Hemorrhoid     History of kidney stones     Last times was circa 2004. History of vertigo      Chief Complaint for this Admission:    Chief Complaint   Patient presents with    Headache     Prior to Admission Medications:   Prior to Admission Medications   Prescriptions Last Dose Informant Taking? VITAMIN B COMPLEX (B COMPLEX 1 PO) 11/11/2020 at Unknown time  Yes   Sig: Take 1 Cap by mouth daily.    azelastine (ASTELIN) 137 mcg (0.1 %) nasal spray 11/11/2020 at Unknown time  Yes   Sig: INSTILL 1 SPRAY BY BOTH NOSTRILS ROUTE TWO (2) TIMES A DAY. cholecalciferol (VITAMIN D3) 1,000 unit tablet 2020 at Unknown time  Yes   Sig: Take 1,000 Units by mouth daily. fluticasone propionate (FLONASE) 50 mcg/actuation nasal spray 2020 at Unknown time  Yes   Si Sprays by Both Nostrils route daily. therapeutic multivitamin (THERAGRAN) tablet 2020 at Unknown time  Yes   Sig: Take 1 Tab by mouth daily. Facility-Administered Medications: None       Please contact the main inpatient pharmacy with any questions or concerns at [phone] and we will direct you to the clinical pharmacist covering this patient's care while in-house.    KAVITHA Crespo

## 2020-11-11 NOTE — ED TRIAGE NOTES
Pt c/o headache and dizziness x 2 weeks, denies fever or chills, +nausea, some tingling to right arm after they took her blood pressure at the md office, \"gait\" is off intermittent for 2 weeks , pt tearful in triage

## 2020-11-11 NOTE — PROGRESS NOTES
Subjective:      Saul Taylor is a 63 y.o. female who presents today with her . She has had intermittent headaches in the back of her head on the left side for about 1 weeks. She states that pain gets worse with having to 'process thinking'. Having some difficulty finding words. She noted today that her gait is not right. 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 1 Bottle 1    albuterol (PROVENTIL HFA, VENTOLIN HFA, PROAIR HFA) 90 mcg/actuation inhaler Take 2 Puffs by inhalation every six (6) hours as needed for Wheezing. 1 Inhaler 0    fluticasone propionate (FLONASE) 50 mcg/actuation nasal spray 2 Sprays by Both Nostrils route daily. 1 Bottle 0    cholecalciferol (VITAMIN D3) 1,000 unit tablet Take 1,000 Units by mouth daily.  VITAMIN B COMPLEX (B COMPLEX 1 PO) Take 1 Cap by mouth daily. Review of Systems    Pertinent items are noted in HPI. Objective:      Wt Readings from Last 3 Encounters:   07/14/20 190 lb (86.2 kg)   01/09/20 186 lb (84.4 kg)   11/12/19 187 lb 3.2 oz (84.9 kg)     BP Readings from Last 3 Encounters:   07/14/20 140/84   01/09/20 146/90   11/12/19 (!) 166/100     Visit Vitals  BP (!) 143/93   Pulse 80   Temp 97.8 °F (36.6 °C)   Resp 16   Ht 5' 6\" (1.676 m)   Wt 190 lb 9.6 oz (86.5 kg)   LMP 10/28/2002 (Exact Date) Comment: age 54   SpO2 97% BMI 30.76 kg/m²     General appearance: alert, cooperative, no distress, appears stated age  [de-identified]. No slurring of speech. Head: Normocephalic, without obvious abnormality, atraumatic  Gait- slight drag of right leg. Assessment/Plan:     Concerning for CVA. Patient sent to ER for immediate evaluation.  will be driving patient to [facility] ER. Follow-up Disposition:     Return if symptoms worsen or fail to improve. Advised patient to call back or return to office if symptoms worsen/change/persist.     Discussed expected course/resolution/complications of diagnosis in detail with patient. Medication risks/benefits/costs/interactions/alternatives discussed with patient. Patient was given an after visit summary which includes diagnoses, current medications, & vitals. Patient expressed understanding with the diagnosis and plan.

## 2020-11-12 ENCOUNTER — APPOINTMENT (OUTPATIENT)
Dept: NON INVASIVE DIAGNOSTICS | Age: 65
End: 2020-11-12
Attending: FAMILY MEDICINE
Payer: COMMERCIAL

## 2020-11-12 ENCOUNTER — APPOINTMENT (OUTPATIENT)
Dept: MRI IMAGING | Age: 65
End: 2020-11-12
Attending: FAMILY MEDICINE
Payer: COMMERCIAL

## 2020-11-12 VITALS
WEIGHT: 194.22 LBS | BODY MASS INDEX: 30.48 KG/M2 | OXYGEN SATURATION: 94 % | DIASTOLIC BLOOD PRESSURE: 86 MMHG | SYSTOLIC BLOOD PRESSURE: 140 MMHG | RESPIRATION RATE: 16 BRPM | HEART RATE: 80 BPM | HEIGHT: 67 IN | TEMPERATURE: 98.3 F

## 2020-11-12 PROBLEM — G45.9 TIA (TRANSIENT ISCHEMIC ATTACK): Status: RESOLVED | Noted: 2020-11-11 | Resolved: 2020-11-12

## 2020-11-12 LAB
AMPHET UR QL SCN: NEGATIVE
ANION GAP SERPL CALC-SCNC: 9 MMOL/L (ref 5–15)
BARBITURATES UR QL SCN: POSITIVE
BENZODIAZ UR QL: NEGATIVE
BUN SERPL-MCNC: 15 MG/DL (ref 6–20)
BUN/CREAT SERPL: 20 (ref 12–20)
CALCIUM SERPL-MCNC: 9.1 MG/DL (ref 8.5–10.1)
CANNABINOIDS UR QL SCN: NEGATIVE
CHLORIDE SERPL-SCNC: 106 MMOL/L (ref 97–108)
CHOLEST SERPL-MCNC: 228 MG/DL
CK SERPL-CCNC: 200 U/L (ref 26–192)
CO2 SERPL-SCNC: 23 MMOL/L (ref 21–32)
COCAINE UR QL SCN: NEGATIVE
CREAT SERPL-MCNC: 0.76 MG/DL (ref 0.55–1.02)
CRP SERPL HS-MCNC: 3.7 MG/L
DRUG SCRN COMMENT,DRGCM: ABNORMAL
ECHO LA TO AORTIC ROOT RATIO: 0.79
ECHO LA TO AORTIC ROOT RATIO: 0.79
ECHO LV INTERNAL DIMENSION DIASTOLIC: 4.47 CM (ref 3.9–5.3)
ECHO LV INTERNAL DIMENSION SYSTOLIC: 2.39 CM
ECHO LV IVSD: 0.91 CM (ref 0.6–0.9)
ECHO LV MASS 2D: 124.6 G (ref 67–162)
ECHO LV MASS INDEX 2D: 62.4 G/M2 (ref 43–95)
ECHO LV POSTERIOR WALL DIASTOLIC: 0.82 CM (ref 0.6–0.9)
ECHO LVOT DIAM: 1.95 CM
ERYTHROCYTE [DISTWIDTH] IN BLOOD BY AUTOMATED COUNT: 11.9 % (ref 11.5–14.5)
ERYTHROCYTE [SEDIMENTATION RATE] IN BLOOD: 6 MM/HR (ref 0–30)
EST. AVERAGE GLUCOSE BLD GHB EST-MCNC: 114 MG/DL
FOLATE SERPL-MCNC: 17.9 NG/ML (ref 5–21)
GLUCOSE BLD STRIP.AUTO-MCNC: 121 MG/DL (ref 65–100)
GLUCOSE SERPL-MCNC: 113 MG/DL (ref 65–100)
HBA1C MFR BLD: 5.6 % (ref 4–5.6)
HCT VFR BLD AUTO: 43.9 % (ref 35–47)
HDLC SERPL-MCNC: 56 MG/DL
HDLC SERPL: 4.1 {RATIO} (ref 0–5)
HGB BLD-MCNC: 14.8 G/DL (ref 11.5–16)
LDLC SERPL CALC-MCNC: 126 MG/DL (ref 0–100)
LIPID PROFILE,FLP: ABNORMAL
MCH RBC QN AUTO: 32.5 PG (ref 26–34)
MCHC RBC AUTO-ENTMCNC: 33.7 G/DL (ref 30–36.5)
MCV RBC AUTO: 96.3 FL (ref 80–99)
METHADONE UR QL: NEGATIVE
NRBC # BLD: 0 K/UL (ref 0–0.01)
NRBC BLD-RTO: 0 PER 100 WBC
OPIATES UR QL: NEGATIVE
PCP UR QL: NEGATIVE
PLATELET # BLD AUTO: 278 K/UL (ref 150–400)
PMV BLD AUTO: 9.3 FL (ref 8.9–12.9)
POTASSIUM SERPL-SCNC: 3.7 MMOL/L (ref 3.5–5.1)
RBC # BLD AUTO: 4.56 M/UL (ref 3.8–5.2)
SERVICE CMNT-IMP: ABNORMAL
SODIUM SERPL-SCNC: 138 MMOL/L (ref 136–145)
TRIGL SERPL-MCNC: 230 MG/DL (ref ?–150)
TROPONIN I SERPL-MCNC: <0.05 NG/ML
TSH SERPL DL<=0.05 MIU/L-ACNC: 8.39 UIU/ML (ref 0.36–3.74)
VIT B12 SERPL-MCNC: 469 PG/ML (ref 193–986)
VLDLC SERPL CALC-MCNC: 46 MG/DL
WBC # BLD AUTO: 7.5 K/UL (ref 3.6–11)

## 2020-11-12 PROCEDURE — 99243 OFF/OP CNSLTJ NEW/EST LOW 30: CPT | Performed by: NURSE PRACTITIONER

## 2020-11-12 PROCEDURE — 84443 ASSAY THYROID STIM HORMONE: CPT

## 2020-11-12 PROCEDURE — 80061 LIPID PANEL: CPT

## 2020-11-12 PROCEDURE — 93306 TTE W/DOPPLER COMPLETE: CPT | Performed by: SPECIALIST

## 2020-11-12 PROCEDURE — C8929 TTE W OR WO FOL WCON,DOPPLER: HCPCS

## 2020-11-12 PROCEDURE — 80048 BASIC METABOLIC PNL TOTAL CA: CPT

## 2020-11-12 PROCEDURE — 83036 HEMOGLOBIN GLYCOSYLATED A1C: CPT

## 2020-11-12 PROCEDURE — 82746 ASSAY OF FOLIC ACID SERUM: CPT

## 2020-11-12 PROCEDURE — 84484 ASSAY OF TROPONIN QUANT: CPT

## 2020-11-12 PROCEDURE — 36415 COLL VENOUS BLD VENIPUNCTURE: CPT

## 2020-11-12 PROCEDURE — 86141 C-REACTIVE PROTEIN HS: CPT

## 2020-11-12 PROCEDURE — 85027 COMPLETE CBC AUTOMATED: CPT

## 2020-11-12 PROCEDURE — 97165 OT EVAL LOW COMPLEX 30 MIN: CPT

## 2020-11-12 PROCEDURE — 70551 MRI BRAIN STEM W/O DYE: CPT

## 2020-11-12 PROCEDURE — 97535 SELF CARE MNGMENT TRAINING: CPT

## 2020-11-12 PROCEDURE — 85652 RBC SED RATE AUTOMATED: CPT

## 2020-11-12 PROCEDURE — 74011250636 HC RX REV CODE- 250/636: Performed by: HOSPITALIST

## 2020-11-12 PROCEDURE — 74011250637 HC RX REV CODE- 250/637: Performed by: FAMILY MEDICINE

## 2020-11-12 PROCEDURE — 82607 VITAMIN B-12: CPT

## 2020-11-12 PROCEDURE — 82962 GLUCOSE BLOOD TEST: CPT

## 2020-11-12 PROCEDURE — 82550 ASSAY OF CK (CPK): CPT

## 2020-11-12 PROCEDURE — 80307 DRUG TEST PRSMV CHEM ANLYZR: CPT

## 2020-11-12 PROCEDURE — 74011000250 HC RX REV CODE- 250: Performed by: HOSPITALIST

## 2020-11-12 PROCEDURE — 99218 HC RM OBSERVATION: CPT

## 2020-11-12 RX ORDER — BUTALBITAL, ACETAMINOPHEN AND CAFFEINE 50; 325; 40 MG/1; MG/1; MG/1
1 TABLET ORAL
Qty: 20 TAB | Refills: 0 | Status: SHIPPED | OUTPATIENT
Start: 2020-11-12 | End: 2020-11-17

## 2020-11-12 RX ADMIN — FAMOTIDINE 20 MG: 20 TABLET ORAL at 08:58

## 2020-11-12 RX ADMIN — SODIUM CHLORIDE 1.5 ML: 9 INJECTION INTRAMUSCULAR; INTRAVENOUS; SUBCUTANEOUS at 10:00

## 2020-11-12 NOTE — ROUTINE PROCESS
Bedside RN performed patient education and medication education. Discharge concerns initiated and discussed with patient and pt's , including clarification on \"who\" assists the patient at their home and instructions for when the home going patient should call their provider after discharge. Opportunity for questions and clarification was provided. Patient receptive to education: YES  Patient stated: \"I'm feeling so relaxed now\"  Barriers to Education: no  Diagnosis Education given:  YES    Length of stay: 0  Expected Day of Discharge: 0  Ask if they have \"Help at Home\" & add to white board?   YES    Education Day #: 0    Medication Education Given:  YES  M in the box Medication name: fioricet    Pt aware of [facility]CA[facility] survey: YES

## 2020-11-12 NOTE — CONSULTS
Neurology [address] [facility]      Date of admission: 11/11/2020    Patient: Abelardo Whatley MRN: [id number]  SSN: [id number]    YOB: 1955  Age: 63 y.o. Sex: female      Chief complaint: headache and dizziness x 2 weeks    Subjective:     HPI: Abelardo Whatley is a 63 y.o. female that reports a persistent headache in the right occipital region, waxing and waning almost everyday for the last 2 weeks, described as feeling like it's bruised but can become pulsating \"banging\" and rarely has associated tingling in a small area of the right shoulder by the neck. Endorses exacerbated by stressed. No visual disturbance. Endorse photo and phonophobia. Denies N/V. Rarely takes ibuprofen but of little use. No hx of head or neck trauma, no neck pain now or in the past. Endorses hx of vertigo, room spinning, but not currently a significant issue. Patient works as a  for [address] [place] [address] and is under a great deal of stress and her  has health issues. Pt reports she had gait issues yesterday, but has since resolved. She feels that the gait and her HAs are stress related. Since she has been admitted, her HAs have resolved, which she attributes to reduced stress and a dose of ativan. No dysarthria, maybe some stress related cognitive clouding, but nothing significant. Patient came to the ER. /110 on admission, . B12, folate normal. A1C 5.6. CMP and CBC w/ diff unremarkable. ESR normal. CRP 3.7, elevated at 3.7, but elevated similarly in 2016. . TSH noted to be 8.39. MRI normal.      Interval 11/12/20:     No acute process on MRI brain. TSH 8.39. HAs now resolved, attributed to stress reduction. HAs have resolved. Past Medical History:   Diagnosis Date    Anxiety     Dermatitis     atypical    Fibrocystic breast     Hemorrhoid     History of kidney stones     Last times was circa 2004.     History of vertigo      Past Surgical History:   Procedure Laterality Date    HX GYN       x2    HX ORTHOPAEDIC      cyst removed left 3rd digit - Dr Jeni Closs HX OTHER SURGICAL  10/28/2011    Incision and drainage of septoc left 3rd finger joint - catbite    HX OTHER SURGICAL  2014    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 Family History   Problem Relation Age of Onset    Arthritis-osteo Mother     Cancer Father         lung -heavy smoker    Cancer Maternal Aunt         breast    Breast Cancer Maternal Aunt     Breast Cancer Maternal Aunt      Social History     Tobacco Use    Smoking status: Current Every Day Smoker     Packs/day: 0.50     Years: 35.00     Pack years: 17.50     Types: Cigarettes    Smokeless tobacco: Never Used   Substance Use Topics    Alcohol use: Yes     Alcohol/week: 3.3 standard drinks     Types: 4 Glasses of wine per week      Prior to Admission medications    Medication Sig Start Date End Date Taking? Authorizing Provider   therapeutic multivitamin [facility]) tablet Take 1 Tab by mouth daily. Yes Provider, Historical   azelastine (ASTELIN) 137 mcg (0.1 %) nasal spray INSTILL 1 SPRAY BY BOTH NOSTRILS ROUTE TWO (2) TIMES A DAY. 19  Yes Moshe COLEY NP   fluticasone propionate (FLONASE) 50 mcg/actuation nasal spray 2 Sprays by Both Nostrils route daily. 19  Yes Moshe COLEY NP   cholecalciferol (VITAMIN D3) 1,000 unit tablet Take 1,000 Units by mouth daily. Yes Provider, Historical   VITAMIN B COMPLEX (B COMPLEX 1 PO) Take 1 Cap by mouth daily.    Yes Provider, Historical     Current Facility-Administered Medications   Medication Dose Route Frequency Provider Last Rate Last Dose    perflutren lipid microspheres (DEFINITY) in NS bolus IV  1.5 mL IntraVENous RAD ONCE Ivy Vargas MD        acetaminophen (TYLENOL) tablet 650 mg  650 mg Oral Q4H PRN Ovi Bolivar MD        Or    acetaminophen (TYLENOL) solution 650 mg  650 mg Per NG tube Q4H PRN Ovi Bolivar MD        Or   [address] [place] [address] acetaminophen (TYLENOL) suppository 650 mg  650 mg Rectal Q4H PRN Shamir Muñoz MD        0.9% sodium chloride infusion  75 mL/hr IntraVENous CONTINUOUS Shamir Muñoz MD 75 mL/hr at 20 75 mL/hr at 20    famotidine (PEPCID) tablet 20 mg  20 mg Oral Q12H Shamir Muñoz MD   20 mg at 20 0858    butalbital-acetaminophen-caffeine (FIORICET, ESGIC) -40 mg per tablet 1 Tab  1 Tab Oral Q6H PRN Shamir Muñoz MD   1 Tab at 20 1939        Allergies   Allergen Reactions    Sulfa (Sulfonamide Antibiotics) Hives     1 week after starting Bactrim - 2016    Clindamycin Rash    Keflex [Cephalexin] Rash    Morphine Other (comments)     Dysphoria/anxiety    Pcn [Penicillins] Rash       Review of systems  Comprehensive review of systems performed and negative except for as documented above. Objective:     Vitals:    20 2204 20 0215 20 0417 20 0558   BP: (!) 149/73 127/66  133/84   Pulse: 83 79  67   Resp: 17 18  14   Temp: 98.6 °F (37 °C) 98.4 °F (36.9 °C)  98.2 °F (36.8 °C)   SpO2: 95% 96% 96% 95%        Temp (24hrs), Av.1 °F (36.7 °C), Min:97.4 °F (36.3 °C), Max:98.6 °F (37 °C)        O2 Device: Room air       No intake or output data in the 24 hours ending 20 1007    General: In NAD. Cardiac: RRR  Lungs: Unlabored breathing. Abdomen: Soft/NT/non-distended. Extremities. No edema. Neurologic Exam:  Mental Status:  Alert and oriented x 4. Language:    Grossly intact fluency and comprehension    Cranial Nerves:   Pupils equal, round and reactive to light. Visual fields intact. Extraocular movements intact w/o nystagmus      Facial sensation intact to LT     Facial activation symmetric. Hearing intact bilaterally. No dysarthria. Shoulder shrug 5/5 bilaterally. Motor:    Bulk and tone normal.      5/5 strength in all extremities. No pronator drift. No involuntary movements.     Sensation: Sensation intact throughout to light touch. Coordination & Gait: No ataxia with finger to nose. Gait deferred    LABS:  Recent Labs     11/12/20 0039 11/11/20  1131   WBC 7.5 7.0   HGB 14.8 15.9   HCT 43.9 45.5    266     Recent Labs     11/12/20 0039 11/11/20  1131    136   K 3.7 3.8    104   CO2 23 27   BUN 15 13   CREA 0.76 0.67   * 102*   CA 9.1 9.3     Recent Labs     11/11/20  1131   ALT 45   AP 89   TBILI 0.5   TP 7.9   ALB 3.9   GLOB 4.0     No results for input(s): INR, PTP, APTT, INREXT in the last 72 hours. No results for input(s): PHI, PCO2I, PO2I, HCO3I in the last 72 hours. Recent Labs     11/12/20 0039   *   TROIQ <0.05     Lab Results   Component Value Date/Time    Cholesterol, total 228 (H) 11/12/2020 12:39 AM    HDL Cholesterol 56 11/12/2020 12:39 AM    LDL, calculated 126 (H) 11/12/2020 12:39 AM    Triglyceride 230 (H) 11/12/2020 12:39 AM    CHOL/HDL Ratio 4.1 11/12/2020 12:39 AM     No results found for: GLUCPOC  No results found for: COLOR, APPRN, SPGRU, REFSG, GOLDIE, PROTU, GLUCU, KETU, BILU, UROU, WENDI, LEUKU, GLUKE, EPSU, BACTU, WBCU, RBCU, CASTS, UCRY    No results for input(s): FE, TIBC, PSAT, FERR in the last 72 hours. Lab Results   Component Value Date/Time    Folate 17.9 11/12/2020 12:39 AM      No results for input(s): PH, PCO2, PO2 in the last 72 hours. Recent Labs     11/12/20 0039   *   TROIQ <0.05       Imaging:  Mri Brain Wo Cont    Result Date: 11/12/2020  IMPRESSION: Normal MRI of the brain. There is no intracranial mass, hemorrhage or evidence of acute infarction. No acute intracranial process is demonstrated. Right parotid lesion. 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 Nonemergent outpatient ENT consultation and parotid ultrasound for further delineation recommended. Ct Head Wo Cont    Result Date: 11/11/2020  IMPRESSION: No acute intracranial hemorrhage, mass or infarct.         CT Results:  [place] [facility] [place]   CT HEAD WO CONT    Narrative INDICATION: dizziness     Exam: Noncontrast CT of the brain is performed with 5 mm collimation. CT dose reduction was achieved with the use of the standardized protocol  tailored for this examination and automatic exposure control for dose  modulation. Adaptive statistical iterative reconstruction (ASIR) was utilized. FINDINGS: There is no acute intracranial hemorrhage, mass, mass effect or  herniation. Ventricular system is normal. The gray-white matter differentiation  is well-preserved. The mastoid air cells are well pneumatized. The visualized  paranasal sinuses are normal.      Impression IMPRESSION: No acute intracranial hemorrhage, mass or infarct. Results from [place] encounter on 08/16/18   CT PELV W CONT    Narrative EXAM:  CT PELV W CONT    INDICATION:   left lower abdominal pain - suspect ventral hernia    COMPARISON: None. CONTRAST:  100 mL of Isovue-370. TECHNIQUE:   Multislice helical CT was performed from the iliac crest to the symphysis pubis  during uneventful rapid bolus intravenous contrast administration. Oral  contrast was administered. Coronal and sagittal reformations were generated. CT dose reduction was achieved through use of a standardized protocol tailored  for this examination and automatic exposure control for dose modulation. FINDINGS:  Numerous diverticula are seen in the sigmoid colon. No evidence of  diverticulitis. The uterus is unremarkable. The bladder is normal.    No acute fracture or dislocation. The sacroiliac joints, pubic symphysis, and  hips are intact. The soft tissues appear unremarkable. Impression IMPRESSION: No evidence of acute process. No evidence of a hernia. Diverticulosis coli. Results from [place] encounter on 01/08/15   CTA CHEST W WO CONT    Narrative **Final Report**       ICD Codes / Adm. Diagnosis: 12  034186 / Back Pain Chest Pain  Examination:  CT CHEST ANGIOGRAPHY  - [id number] - Jan 8 2015  2:59PM  Accession No:  [id number]  Reason:  Chest pain R/O PE      REPORT:  CT ANGIOGRAPHY CHEST. 1/8/2015 2:59 PM     INDICATION: Chest pain, back pain. COMPARISON: None. TECHNIQUE: CT angiography of the chest was performed after the   administration of 70 cc IV contrast (Optiray 350). Coronal and sagittal, and   coronal MIP reconstructions were performed. FINDINGS:  There is no pulmonary embolism. The lungs are clear and the central airways   are patent. No pneumothorax or pleural effusion. Incidental note is made of   thyroid goiter without focal nodule. Bovine arch is a normal variant   branching of the great vessels. The heart size is normal. No thoracic   lymphadenopathy. Incidental left upper pole renal cyst. There may be a   partially imaged left adrenal nodule at the inferior field-of-view. The   visualized upper abdomen is otherwise normal.        IMPRESSION:  No PE. Clear lungs. Signing/Reading Doctor: Wilson Cochran ([id number])    Approved: Wilson Cochran ([id number])  Jan 8 2015  3:11PM                                 MRI Results:  Results from Hospital Encounter encounter on 11/11/20   MRI BRAIN WO CONT    Narrative CLINICAL HISTORY: AMS. INDICATION: AMS. COMPARISON: CT head 11/11/2020    TECHNIQUE: MR examination of the brain includes axial and sagittal T1, coronal  T2, axial T2, axial FLAIR, axial gradient echo, axial DWI. CONTRAST: None    FINDINGS:   There is no intracranial mass, hemorrhage or evidence of acute infarction. IACs are symmetric in size. Trace fluid in the mastoid air cells on the right. Trace mucoperiosteal thickening in the right maxillary sinus. T2 hyperintense  lesion in the right parotid gland. The brain architecture is normal. There is no evidence of midline shift or  mass-effect. There are no extra-axial fluid collections. There is no Chiari or  syrinx.  The pituitary and infundibulum are grossly unremarkable. There is no  skull base mass. Cerebellopontine angles are grossly unremarkable. The major  intracranial vascular flow-voids are unremarkable. The cavernous sinuses are  symmetric. Optic chiasm and infundibulum grossly unremarkable. Orbits are  grossly symmetric. Dural venous sinuses are grossly patent. The mastoid air cells are well pneumatized and clear. Impression IMPRESSION:  Normal MRI of the brain. There is no intracranial mass, hemorrhage or evidence of acute infarction. No acute intracranial process is demonstrated. Right parotid lesion. 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 Nonemergent outpatient ENT consultation and parotid ultrasound for further  delineation recommended. XR Results   Results from [place] encounter on 10/30/18   XR CHEST PA LAT    Impression IMPRESSION:    Normal heart size. No acute infiltrate or overt cardiac decompensation. Osteopenic-appearing bones. Results from [place] encounter on 04/16/16   XR WRIST LT AP/LAT/OBL   Results from [place] encounter on 01/07/15   XR CHEST PA LAT       VAS/US Results (maximum last 3): No results found for this or any previous visit.     TTE         EKG  Results for orders placed or performed during the hospital encounter of 11/11/20   EKG, 12 LEAD, INITIAL   Result Value Ref Range    Ventricular Rate 81 BPM    Atrial Rate 81 BPM    P-R Interval 148 ms    QRS Duration 92 ms    Q-T Interval 390 ms    QTC Calculation (Bezet) 453 ms    Calculated P Axis 66 degrees    Calculated R Axis 62 degrees    Calculated T Axis 69 degrees    Diagnosis       Normal sinus rhythm  When compared with ECG of 08-JAN-2015 12:43,  No significant change was found  Confirmed by Arnie Rodriguez MD, Socorro Gil ([id number]) on 11/11/2020 5:37:41 PM         Hospital Problems  Date Reviewed: 11/11/2020          Codes Class Noted POA    TIA (transient ischemic attack) ICD-10-CM: G45.9  ICD-9-CM: 435.9 11/11/2020 Unknown              Assessment/Plan:     Ivon Hooker is a 63 y.o. female that reports a persistent headache in the right occipital region, waxing and waning almost everyday for the last 2 weeks, described as feeling like it's bruised but can become pulsating \"banging\" and rarely has associated tingling in a small area of the right shoulder by the neck. Stress related, somewhat migrainous, but do not want to diagnose with migraine based on one episode. Pt denies hx of stress related HAs, she usually \"just dealt with it (stress)\". Denies HA in occipital region prior to this event. Endorses HA exacerbated by stress. No visual disturbance. Endorse photo and phonophobia. Denies N/V. No hx of head or neck trauma, no neck pain now or in the past. Endorses hx of vertigo, room spinning, but not currently a significant issue. Goes home and sits in the dark on her porch for 20 minutes and let's her \"brain dump\" and HA resolves. Patient works as a  for [address] [place] [address] and is under a great deal of stress and her  has health issues and she feels \"overwhelmed\". Since admission, her HAs have resolved, which she attributes to reduced stress and a dose of ativan. No dysarthria related to to the HAs, maybe some transient stress related cognitive clouding, but nothing significant and not experiencing currently. Tearful at some points during this history taking discussion. Otherwise cheerful. MRI negative for acute process. Neurological exam non-focal.     - Recommend outpatient counseling for strategies to manage stress  - Consider psych consult  - Recommend treating TSH noted to be 8.39. Thank you for this consult.     Signed By: Veronica Rodriguez NP     November 12, 2020 10:07 AM

## 2020-11-12 NOTE — DISCHARGE SUMMARY
Discharge Summary       PATIENT ID: Giselle Gatica  MRN: [id number]   YOB: 1955    DATE OF ADMISSION: 11/11/2020  5:20 PM    DATE OF DISCHARGE: 11/12/20   PRIMARY CARE PROVIDER: Swapna Chavarria MD     ATTENDING PHYSICIAN: Margot Mcghee  DISCHARGING PROVIDER: Buzz Ortiz MD    To contact this individual call [phone] and ask the  to page. If unavailable ask to be transferred the Adult Hospitalist Department. CONSULTATIONS: IP CONSULT TO NEUROLOGY    PROCEDURES/SURGERIES: * No surgery found *    ADMITTING [address] [place] [address] COURSE:   Giselle Gatica is a 63 y.o. female who presents with headache     History is primary obtained from the patient, patient reports that she has persistent headache in the right occipital region that has been going on for the last 2 weeks. Per chart review patient has also had some intermittent confusion and some dysarthria associated with her symptoms. Patient works as a  for [address] [place] [address] and is under a great deal of stress. Patient has had trouble getting her words out and has had trouble thinking clearly. Per ER physician patient has had episodes which she has had been leaning on the left side when she walks. Patient came to the ER today and was requested to be observed under the hospitalist service      Mri Brain Wo Cont    Result Date: 11/12/2020  IMPRESSION: Normal MRI of the brain. There is no intracranial mass, hemorrhage or evidence of acute infarction. No acute intracranial process is demonstrated. Right parotid lesion. 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 Nonemergent outpatient ENT consultation and parotid ultrasound for further delineation recommended. Ct Head Wo Cont    Result Date: 11/11/2020  IMPRESSION: No acute intracranial hemorrhage, mass or infarct.       Echo - EF normal [facility]      DISCHARGE DIAGNOSES / PLAN:      1. D/c HOME WITH pcp F/U      ADDITIONAL CARE RECOMMENDATIONS:        PENDING TEST RESULTS:   At the time of discharge the following test results are still pending:     FOLLOW UP APPOINTMENTS:    Follow-up Information     Follow up With Specialties Details Why Contact Info    Den Lua MD Internal Medicine In 1 week  [address] [facility]      Semaj Rodriguez MD Otolaryngology In 4 weeks parotid lesion [address] [place] [address] [address]   [phone]      Ligia Adler NP Family Medicine Go on 11/16/2020 Hospital follow up appt has been scheduled for Nov 16 @ 10:00AM [address]  [address] [facility]  [phone]               DIET: Cardiac Diet    ACTIVITY: Activity as tolerated    WOUND CARE:     EQUIPMENT needed:       DISCHARGE MEDICATIONS:  Current Discharge Medication List      START taking these medications    Details   butalbital-acetaminophen-caffeine (FIORICET, ESGIC) -40 mg per tablet Take 1 Tab by mouth every six (6) hours as needed for Headache for up to 5 days. Qty: 20 Tab, Refills: 0         CONTINUE these medications which have NOT CHANGED    Details   therapeutic multivitamin (THERAGRAN) tablet Take 1 Tab by mouth daily. azelastine (ASTELIN) 137 mcg (0.1 %) nasal spray INSTILL 1 SPRAY BY BOTH NOSTRILS ROUTE TWO (2) TIMES A DAY. Qty: 1 Bottle, Refills: 1      fluticasone propionate (FLONASE) 50 mcg/actuation nasal spray 2 Sprays by Both Nostrils route daily. Qty: 1 Bottle, Refills: 0      cholecalciferol (VITAMIN D3) 1,000 unit tablet Take 1,000 Units by mouth daily. VITAMIN B COMPLEX (B COMPLEX 1 PO) Take 1 Cap by mouth daily. NOTIFY YOUR PHYSICIAN FOR ANY OF THE FOLLOWING:   Fever over 101 degrees for 24 hours. Chest pain, shortness of breath, fever, chills, nausea, vomiting, diarrhea, change in mentation, falling, weakness, bleeding. Severe pain or pain not relieved by medications. Or, any other signs or symptoms that you may have questions about.     DISPOSITION:  x  Home With:   OT  PT HH  RN       Long term SNF/Inpatient Rehab    Independent/assisted living    Hospice    Other:       PATIENT CONDITION AT DISCHARGE:     Functional status    Poor     Deconditioned    x Independent      Cognition   x  Lucid     Forgetful     Dementia      Catheters/lines (plus indication)    Lopez     PICC     PEG    x None      Code status   x  Full code     DNR      PHYSICAL EXAMINATION AT DISCHARGE:  General:          Alert, cooperative, no distress, appears stated age. HEENT:           Atraumatic, anicteric sclerae, pink conjunctivae                          No oral ulcers, mucosa moist, throat clear, dentition fair  Neck:               Supple, symmetrical  Lungs:             Clear to auscultation bilaterally. No Wheezing or Rhonchi. No rales. Chest wall:      No tenderness  No Accessory muscle use. Heart:              Regular  rhythm,  No  murmur   No edema  Abdomen:        Soft, non-tender. Not distended. Bowel sounds normal  Extremities:     No cyanosis. No clubbing,                            Skin turgor normal, Capillary refill normal  Skin:                Not pale. Not Jaundiced  No rashes   Psych:             Not anxious or agitated. Neurologic:      Alert, moves all extremities, answers questions appropriately and responds to commands       CHRONIC MEDICAL DIAGNOSES:  Problem List as of 11/12/2020 Date Reviewed: 11/12/2020          Codes Class Noted - Resolved    Advance directive discussed with patient (Chronic) ICD-10-CM: Z71.89  ICD-9-CM: V65.49  2/24/2017 - Present    Overview Signed 2/24/2017 11:02 AM by Alverna Goodell, MD     2/24/2017 - -the [place] Advanced Directive for Healthcare template given to patient for review and completion. Patient asked to provide us with a copy once it is completed.               Controlled substance agreement signed (Chronic) ICD-10-CM: J09.045  ICD-9-CM: V58.69  2/24/2017 - Present    Overview Signed 2/24/2017  4:15 PM by Alverna Goodell, MD     Signed  2/24/2017 for ativan use. Colon cancer screening (Chronic) ICD-10-CM: Z12.11  ICD-9-CM: V76.51  8/22/2014 - Present    Overview Addendum 2/11/2020  3:41 PM by MD Dr. Estella Marti, 1/23/2020. Follow up in 10 years. Squamous cell carcinoma of foot ICD-10-CM: C44.721  ICD-9-CM: 173.72  8/22/2014 - Present    Overview Signed 8/22/2014  5:53 PM by MD Dr. Patti Marti.             Allergic rhinitis ICD-10-CM: J30.9  ICD-9-CM: 477.9  7/22/2014 - Present        Pap smear for cervical cancer screening (Chronic) ICD-10-CM: Z12.4  ICD-9-CM: V76.2  7/23/2012 - Present    Overview Addendum 2/24/2017 10:44 AM by Ja Reilly MD     2/24/2017   , Dr. Lora Moralez             History of screening mammography (Chronic) ICD-10-CM: Z92.89  ICD-9-CM: V15.89  7/23/2012 - Present    Overview Addendum 8/16/2018  2:22 PM by Ja Reilly MD     8/15/18, [facility]             History of kidney stones ICD-10-CM: N01.412  ICD-9-CM: V13.01  7/23/2012 - Present    Overview Signed 7/23/2012 10:58 AM by Ja Reilly MD     2005             Hemorrhoid ICD-10-CM: K64.9  ICD-9-CM: 455.6  10/12/2011 - Present        Fibrocystic breast ICD-10-CM: N60.19  ICD-9-CM: 610.1  10/12/2011 - Present        Anxiety ICD-10-CM: F41.9  ICD-9-CM: 300.00  10/12/2011 - Present        RESOLVED: TIA (transient ischemic attack) ICD-10-CM: G45.9  ICD-9-CM: 435.9  11/11/2020 - 11/12/2020        RESOLVED: Cat bite ICD-10-CM: W55. 01XA  ICD-9-CM: 879.8, E906.3  4/17/2016 - 2/24/2017        RESOLVED: Allergy to sulfa drugs ICD-10-CM: Z88.2  ICD-9-CM: V14.2  2/15/2016 - 2/24/2017        RESOLVED: Atopic dermatitis ICD-10-CM: L20.9  ICD-9-CM: 691.8  7/23/2012 - 2/24/2017        RESOLVED: Cellulitis and abscess of hand ICD-10-CM: L03.119, L02.519  ICD-9-CM: 682.4  10/28/2011 - 7/23/2012        RESOLVED: Pneumonia ICD-10-CM: J18.9  ICD-9-CM: 913  10/12/2011 - 7/23/2012        RESOLVED: Dermatitis ICD-10-CM: L30.9  ICD-9-CM: 692.9  10/12/2011 - 7/23/2012 Greater than 30  minutes were spent with the patient on counseling and coordination of care    Signed:   Codie Hinds MD  11/12/2020  2:45 PM

## 2020-11-12 NOTE — DISCHARGE INSTRUCTIONS
Discharge Instructions       PATIENT ID: Silvia Hassan  MRN: [id number]   YOB: 1955    DATE OF ADMISSION: 11/11/2020  5:20 PM    DATE OF DISCHARGE: 11/12/2020    PRIMARY CARE PROVIDER: Jmaila Wilson MD     ATTENDING PHYSICIAN: Chapincito Vora MD  DISCHARGING PROVIDER: Jacey Hawley MD    To contact this individual call [phone] and ask the  to page. If unavailable ask to be transferred the Adult Hospitalist Department. DISCHARGE DIAGNOSES Migraine    CONSULTATIONS: IP CONSULT TO NEUROLOGY    PROCEDURES/SURGERIES: * No surgery found *    PENDING TEST RESULTS:   At the time of discharge the following test results are still pending:     FOLLOW UP APPOINTMENTS:   Follow-up Information     Follow up With Specialties Details Why Contact Info    Jamila Wilson MD Internal Medicine In 1 week  [address] [place] [address] [address] [facility]      Boy Johnson MD Otolaryngology In 4 weeks parotid lesion [address]  [phone]             ADDITIONAL CARE RECOMMENDATIONS: please f/u with ENT doctor as instructed ( Dr. Mayra Borden)     DIET: Cardiac Diet and Diabetic Diet      ACTIVITY: Activity as tolerated    WOUND CARE:     EQUIPMENT needed:       Radiology      DISCHARGE MEDICATIONS:   See Medication Reconciliation Form    · It is important that you take the medication exactly as they are prescribed. · Keep your medication in the bottles provided by the pharmacist and keep a list of the medication names, dosages, and times to be taken in your wallet. · Do not take other medications without consulting your doctor. NOTIFY YOUR PHYSICIAN FOR ANY OF THE FOLLOWING:   Fever over 101 degrees for 24 hours. Chest pain, shortness of breath, fever, chills, nausea, vomiting, diarrhea, change in mentation, falling, weakness, bleeding. Severe pain or pain not relieved by medications.   Or, any other signs or symptoms that you may have questions about.      DISPOSITION:  x  Home With:   OT  PT  HH  RN       SNF/Inpatient Rehab/LTAC    Independent/assisted living    Hospice    Other:     CDMP Checked:   Yes x     PROBLEM LIST Updated:  Yes x       Signed:   Judie Gross MD  11/12/2020  10:11 AM

## 2020-11-12 NOTE — PROGRESS NOTES
Bedside and Verbal shift change report given to ESSIE Cao (oncoming nurse) by Anna Marie Claros RN (offgoing nurse). Report included the following information SBAR, Kardex, ED Summary, MAR, Recent Results, Cardiac Rhythm NSR and Dual Neuro Assessment.

## 2020-11-12 NOTE — PROGRESS NOTES
SLP Contact Note    Consult received and appreciated. Patient chart reviewed and discussed with RN. Pt passed STAND and is tolerating diet. MRI negative for acute infarct. Therefore, no further SLP needs anticipated. Will sign-off. Please reconsult should SLP be of any assistance.       Thank you,  STACY Hinson.Ed, [address] [facility]  Speech-Language Pathologist

## 2020-11-12 NOTE — ROUTINE PROCESS
Hospital follow-up PCP transitional care appointment has been scheduled with ANASTASIYA Larkin for Nov 16 @ 10AM.  Pending patient discharge.   Mikki Randall, Care Management Specialist.

## 2020-11-12 NOTE — PROGRESS NOTES
Spiritual Care Assessment/Progress Note  [facility]      NAME: Anne-Marie Swenson      MRN: [id number]  AGE: 63 y.o.  SEX: female  Restorationism Affiliation: Quaker   Language: English     11/12/2020     Total Time (in minutes): 20     Spiritual Assessment begun in [address] through conversation with:         [x]Patient        [] Family    [] Friend(s)        Reason for Consult: Advance medical directive consult, Initial/Spiritual assessment, patient floor     Spiritual beliefs: (Please include comment if needed)     [x] Identifies with a saw tradition:    Quaker     [] Supported by a saw community:            [] Claims no spiritual orientation:           [] Seeking spiritual identity:                [] Adheres to an individual form of spirituality:           [] Not able to assess:                           Identified resources for coping:      [] Prayer                               [] Music                  [] Guided Imagery     [x] Family/friends                 [] Pet visits     [] Devotional reading                         [] Unknown     [] Other:                                               Interventions offered during this visit: (See comments for more details)    Patient Interventions: Advance medical directive consult, Affirmation of emotions/emotional suffering, Affirmation of saw, Catharsis/review of pertinent events in supportive environment, Coping skills reviewed/reinforced, Iconic (affirming the presence of God/Higher Power), End of life issues discussed           Plan of Care:     [] Support spiritual and/or cultural needs    [] Support AMD and/or advance care planning process      [] Support grieving process   [] Coordinate Rites and/or Rituals    [] Coordination with community clergy   [] No spiritual needs identified at this time   [] Detailed Plan of Care below (See Comments)  [] Make referral to Music Therapy  [] Make referral to Pet Therapy     [] Make referral to Addiction services  [] Make referral to [facility]  [] Make referral to Spiritual Care Partner  [] No future visits requested        [x] Follow up visits as needed     Comments:  visit for initial spiritual assessment and Advance Directive (AMD) consult. Patient reclining in bed, resting. Good eye contact, friendly. Says she is feeling better. Provided spiritual presence and listening as she spoke of her present thoughts, feelings, and concerns. Spoke of her health and events leading to this hospitalization. Patient requested information concerning AMD.  Provided blank copy of AMD, a copy of the booklet, \"Your Right to Decide,\" and information card describing availability of  and pastoral care services. Explained the document page by page, completing as much of document as able. Patient would like to think about the type of care she would like to include on the AMD and discuss this with her family prior to making a decision. Asked patient to have her nurse contact the  should she wish to complete AMD during this hospitalization and she agreed. She appeared comforted as a result of this visit and expressed gratitude for this visit.       Visited by Rev. April Bhakta MDiv, [facility], [facility]   paging service: [phone]

## 2020-11-12 NOTE — PROGRESS NOTES
OCCUPATIONAL THERAPY EVALUATION/DISCHARGE  Patient: Meg Black (22 y.o. female)  Date: 11/12/2020  Primary Diagnosis: TIA (transient ischemic attack) [G45.9]       Precautions:        ASSESSMENT  Based on the objective data described below, the patient presents with headache, anxiety, and difficulty visual tracking with formal assessment. Pt received in bed, just waking up from a nap. Initially disoriented, reporting therapist's voice was too loud and blinking frequently. Willing to participate in therapy session, demonstrating normal UB strength and ROM. Pt able to track in all directions, however, demonstrated difficulty with formal visual assessment with increased blinking at midline while tracking and closing eyes to reorient. No reports of diplopia and no nystagmus. Pt demonstrated anxiety with visual assessment, however, able to read document without difficulty. Completed functional mobility to bathroom, toileting, and hygiene at sink (Supervision). Pt fully oriented by end of session. Pt expressed that she feels she's improving and functioning at baseline for ADLs. No OT needs currently identified at discharge. Current Level of Function (ADLs/self-care): Independent in self-care     Functional Outcome Measure:  100/100 on Barthel indicating no dependency for ADLs. Other factors to consider for discharge: Lives with      PLAN :  Recommendation for discharge: (in order for the patient to meet his/her long term goals)  No skilled occupational therapy/ follow up rehabilitation needs identified at this time. This discharge recommendation:  Has been made in collaboration with the attending provider and/or case management    IF patient discharges home will need the following DME: none       SUBJECTIVE:   Patient stated I don't know why I feel anxious about this.     OBJECTIVE DATA SUMMARY:   HISTORY:   Past Medical History:   Diagnosis Date    Anxiety     Dermatitis     atypical    Fibrocystic breast     Hemorrhoid     History of kidney stones     Last times was circa .  History of vertigo      Past Surgical History:   Procedure Laterality Date    HX GYN       x2    HX ORTHOPAEDIC      cyst removed left 3rd digit - Dr Marshall Granados HX OTHER SURGICAL  10/28/2011    Incision and drainage of septoc left 3rd finger joint - catbite    HX OTHER SURGICAL  2014    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 Prior Level of Function/Environment/Context: Independent   Expanded or extensive additional review of patient history:     Home Situation  Home Environment: Private residence  One/Two Story Residence: Two story  Living Alone: No  Support Systems: Spouse/Significant Other/Partner  Patient Expects to be Discharged to[de-identified] Private residence  Current DME Used/Available at Home: None        EXAMINATION OF PERFORMANCE DEFICITS:  Cognitive/Behavioral Status:  Neurologic State: Alert  Orientation Level: Oriented X4  Cognition: Appropriate decision making; Appropriate for age attention/concentration; Appropriate safety awareness; Follows commands             Skin: dry, intact    Edema: none noted     Hearing: Auditory  Auditory Impairment: None    Vision/Perceptual:    Tracking: Able to track stimulus in all quadrants w/o difficulty(Increase blinking at midline,best with unstructured activity)                 Diplopia: No         Corrective Lenses: Glasses    Range of Motion:    AROM: Within functional limits                         Strength:    Strength: Within functional limits                Coordination:  Coordination: Within functional limits  Fine Motor Skills-Upper: Left Intact; Right Intact    Gross Motor Skills-Upper: Left Intact; Right Intact    Tone & Sensation:    Tone: Normal  Sensation: Intact                      Balance:  Sitting: Intact  Standing: Intact    Functional Mobility and Transfers for ADLs:  Bed Mobility:  Rolling: Supervision  Supine to Sit: Supervision  Sit to Supine: Supervision  Scooting: Supervision    Transfers:  Sit to Stand: Supervision  Stand to Sit: Supervision  Bathroom Mobility: Supervision/set up  Toilet Transfer : Supervision    ADL Assessment:  Feeding: Independent    Oral Facial Hygiene/Grooming: Independent    Bathing: Independent    Upper Body Dressing: Independent    Lower Body Dressing: Independent    Toileting: Independent                ADL Intervention and task modifications:       Grooming  Grooming Assistance: Supervision  Position Performed: Standing  Washing Hands: Supervision                        Toileting  Toileting Assistance: Supervision  Bladder Hygiene: Independent  Clothing Management: Independent         Therapeutic Exercise:     Functional Measure:  Barthel Index:    Bathin  Bladder: 10  Bowels: 10  Groomin  Dressing: 10  Feeding: 10  Mobility: 15  Stairs: 10  Toilet Use: 10  Transfer (Bed to Chair and Back): 15  Total: 100/100        The Barthel ADL Index: Guidelines  1. The index should be used as a record of what a patient does, not as a record of what a patient could do. 2. The main aim is to establish degree of independence from any help, physical or verbal, however minor and for whatever reason. 3. The need for supervision renders the patient not independent. 4. A patient's performance should be established using the best available evidence. Asking the patient, friends/relatives and nurses are the usual sources, but direct observation and common sense are also important. However direct testing is not needed. 5. Usually the patient's performance over the preceding 24-48 hours is important, but occasionally longer periods will be relevant. 6. Middle categories imply that the patient supplies over 50 per cent of the effort. 7. Use of aids to be independent is allowed. Lyle Clement., Barthel, D.W. (1117). Functional evaluation: the Barthel Index. [facility] (14)2.   ANNEMARIE Serrano, Suri Hatch., Rochelle Garcia., Breanna Brooks (1999). Measuring the change indisability after inpatient rehabilitation; comparison of the responsiveness of the Barthel Index and Functional Rio Measure. Journal of Neurology, Neurosurgery, and Psychiatry, 66(4), 171-060. GHASSAN Landis, JERMAIN Matson, & Renata Peng M.A. (2004.) Assessment of post-stroke quality of life in cost-effectiveness studies: The usefulness of the Barthel Index and the EuroQoL-5D. Quality of Life Research, 15, 742-90         Occupational Therapy Evaluation Charge Determination   History Examination Decision-Making   LOW Complexity : Brief history review  LOW Complexity : 1-3 performance deficits relating to physical, cognitive , or psychosocial skils that result in activity limitations and / or participation restrictions  LOW Complexity : No comorbidities that affect functional and no verbal or physical assistance needed to complete eval tasks       Based on the above components, the patient evaluation is determined to be of the following complexity level: LOW   Pain Rating:  Initial headache resolved by end of session       Activity Tolerance:   Good    After treatment patient left in no apparent distress:    Supine in bed, Call bell within reach and nsg present    COMMUNICATION/EDUCATION:   The patients plan of care was discussed with: Physical therapist and Registered nurse. Patient and/or family was verbally educated on the BE FAST acronym for signs/symptoms of CVA and TIA. BE FAST was written on patient's communication board  for visual education and reinforcement. All questions answered with patient indicating understanding.      Thank you for this referral.  Michael Corea

## 2020-11-12 NOTE — ACP (ADVANCE CARE PLANNING)
requested for Advance Directive (AMD) consult. Patient requested information concerning AMD.  Provided blank copy of AMD, a copy of the booklet, \"Your Right to Decide,\" and information card describing availability of  and pastoral care services. Explained the document page by page, completing as much of document as able. Patient would like to think about the type of care she would like to include on the AMD and discuss this with her family prior to making a decision. Asked patient to have her nurse contact the  should she wish to complete AMD during this hospitalization and she agreed. She appeared comforted as a result of this visit and expressed gratitude for this visit.       Visited by Rev. Wade Mckeon MDiv, [facility], [facility]   paging service: [phone]

## 2020-11-12 NOTE — PROGRESS NOTES
Transition of Care Plan   RUR- N/A Low Risk   DISPOSITION: The disposition plan is TBD pending therapy and care recommendations but is likely home with family assistance; pending medical progression    Transport: Family; , Latia Neil    Reason for Admission:   TIA                   RUR Score: N/A                    Plan for utilizing home health:   TBD pending care and therapy recommendations. PCP: First and Last name:  Tiffany Wells   Name of Practice: [facility]  [facility] [place] [facility]   Are you a current patient: Yes/No: Yes   Approximate date of last visit: 11/11/2020   Can you participate in a virtual visit with your PCP:  Yes                    Current Advanced Directive/Advance Care Plan: Does not currently have ACP documents on file but would like to complete them while in the hospital.                         Transition of Care Plan: The disposition plan is TBD pending care and therapy recommendations. Reviewed chart for transitions of care,and discussed in rounds. CM met with patient at bedside to explain role and offer support. Patient is alert and oriented x4, and confirmed demographics. Patient lives with her  in a two story home. The home has 10 steps to get inside. She is independent and does not require any DMEs to ambulate. Her preferred pharmacy is [facility] on [address] [facility]. Her , Latia Neil, is expected to transport home when discharged. CM will page pastoral care to complete ACP documents with patient. Observation notice provided in writing to patient and/or caregiver as well as verbal explanation of the policy. Patients who are in outpatient status also receive the Observation notice. Care Management Interventions  PCP Verified by CM: Lizbeth Mosley MD)  Last Visit to PCP: 11/11/20  Palliative Care Criteria Met (RRAT>21 & CHF Dx)?: No  Mode of Transport at Discharge:  Other (see comment)(, Latia Neil)  Transition of Care Consult (CM Consult): Discharge Planning  MyChart Signup: Yes  Discharge Durable Medical Equipment: No  Physical Therapy Consult: Yes  Occupational Therapy Consult: Yes  Speech Therapy Consult: Yes  Current Support Network: Lives with Spouse, Own Home  Confirm Follow Up Transport: Family(, Blanca Christianson)  The Patient and/or Patient Representative was Provided with a Choice of Provider and Agrees with the Discharge Plan?: Yes  Discharge Location  Discharge Placement: [address], [facility] [address] Work Student

## 2020-11-12 NOTE — PROGRESS NOTES
Physical Therapy: Defer    Orders acknowledged, chart reviewed, attempted to see pt for PT eval.  Pt currently leaving the floor for testing and is unavailable at this time. Will defer follow up. @0202: Discussed with OT who just evaluated pt. Patient has no mobility needs and is cleared to d/c home from a mobility standpoint. Will sign off.       Flores Lemon, PT, DPT

## 2020-11-12 NOTE — PROGRESS NOTES
Problem: Falls - Risk of  Goal: *Absence of Falls  Description: Document Elmer Heath Fall Risk and appropriate interventions in the flowsheet.   Outcome: Progressing Towards Goal  Note: Fall Risk Interventions:  Mobility Interventions: Patient to call before getting OOB         Medication Interventions: Assess postural VS orthostatic hypotension, Patient to call before getting OOB, Teach patient to arise slowly                   Problem: TIA/CVA Stroke: Day 2 Until Discharge  Goal: Activity/Safety  Outcome: Progressing Towards Goal  Goal: Diagnostic Test/Procedures  Outcome: Progressing Towards Goal  Goal: Nutrition/Diet  Outcome: Progressing Towards Goal  Goal: Discharge Planning  Outcome: Progressing Towards Goal  Goal: Medications  Outcome: Progressing Towards Goal  Goal: Respiratory  Outcome: Progressing Towards Goal  Goal: Treatments/Interventions/Procedures  Outcome: Progressing Towards Goal  Goal: Psychosocial  Outcome: Progressing Towards Goal  Goal: *Verbalizes anxiety and depression are reduced or absent  Outcome: Progressing Towards Goal  Goal: *Absence of aspiration  Outcome: Progressing Towards Goal  Goal: *Absence of deep venous thrombosis signs and symptoms(Stroke Metric)  Outcome: Progressing Towards Goal  Goal: *Optimal pain control at patient's stated goal  Outcome: Progressing Towards Goal  Goal: *Tolerating diet  Outcome: Progressing Towards Goal  Goal: *Ability to perform ADLs and demonstrates progressive mobility and function  Outcome: Progressing Towards Goal  Goal: *Stroke education continued(Stroke Metric)  Outcome: Progressing Towards Goal

## 2020-11-12 NOTE — PROGRESS NOTES
Problem: TIA/CVA Stroke: 0-24 hours  Goal: Off Pathway (Use only if patient is Off Pathway)  Outcome: Progressing Towards Goal  Goal: Activity/Safety  Outcome: Progressing Towards Goal  Goal: Consults, if ordered  Outcome: Progressing Towards Goal  Goal: Diagnostic Test/Procedures  Outcome: Progressing Towards Goal  Goal: Nutrition/Diet  Outcome: Progressing Towards Goal

## 2020-11-12 NOTE — PROGRESS NOTES
Occupational Therapy  11/12/2020    Order received, chart reviewed. Attempted to visit for OT. Pt leaving floor for ECHO. Will follow up as able and appropriate. Thank you.      Royce Mackenzie, OTS

## 2020-11-13 ENCOUNTER — PATIENT OUTREACH (OUTPATIENT)
Dept: CASE MANAGEMENT | Age: 65
End: 2020-11-13

## 2020-11-13 NOTE — PROGRESS NOTES
Patient was admitted to [facility] on 2020 and discharged on 2020 for TIA. Outreach made within 2 business days of discharge: Yes    Top Discharge Challenges to be reviewed by the provider   Additional needs identified to be addressed with provider: confirm need to see ENT for parotid lesion noted on MRI. Patient has tenderness in right parotid area which is the same side where headache begins. Discussed COVID-19 related testing which was not done at this time. Test results were not done. Patient informed of results, if available? N/A   Method of communication with provider : none       Advance Care Planning:   Does patient have an Advance Directive: Currently not on file; education provided. Patient has the paperwork and will fill out to take to PCP office on 2020. Inpatient Readmission Risk score: Not available. Was this a readmission? No     Care Transition Nurse (CTN) contacted the patient by telephone to perform post hospital discharge assessment. Verified name and  with patient as identifiers. Provided introduction to self, and explanation of the CTN role. CTN reviewed discharge instructions, medical action plan and red flags with patient who verbalized understanding. Patient given an opportunity to ask questions and does not have any further questions or concerns at this time. The patient agrees to contact the PCP office for questions related to their healthcare. Medication reconciliation was performed with patient, who verbalizes understanding of administration of home medications. Advised obtaining a 90-day supply of all daily and as-needed medications. Referral to Pharm D needed: no     Home Health/Outpatient orders at discharge: none     Durable Medical Equipment ordered at discharge: None    Covid Risk Education    Patient has following risk factors of: no known risk factors.  Education provided regarding infection prevention, and signs and symptoms of COVID-19 and when to seek medical attention with patient who verbalized understanding. Discussed exposure protocols and quarantine From CDC: Are you at higher risk for severe illness?  and given an opportunity for questions and concerns. The patient agrees to contact the COVID-19 hotline 360-977-2195 or PCP office for questions related to COVID-19. For more information on steps you can take to protect yourself, see CDC's How to Protect Yourself     Patient/family/caregiver given information for GetWell Loop and agrees to enroll no    Discussed follow-up appointments. If no appointment was previously scheduled, appointment scheduling offered: Patient will schedule ENT appointment today. [facility] follow up appointment(s):   Future Appointments   Date Time Provider Colette Camacho   11/16/2020 10:00 AM ANASTASIYA Evans   1/25/2021 12:40 PM MD SHONNA Howe     [facility] follow up appointment(s): None at this time. Plan for follow-up call in 5-7 days based on severity of symptoms and risk factors. CTN provided contact information for future needs. Goals Addressed                 This Visit's Progress       Post Hospitalization     Understands red flags post discharge. 11/13/2020 Patient reports she is feeling better today. She did develop a slight headache last night. She took Fiorecett as directed, headache was relieved, and slept for 12 hours. Discussed need to see ENT for parotid lesion noted on MRI. Patient does have tenderness in right parotid area which is the same side where headache begins. Reviewed discharge instructions, red flags for stroke, when to call 911, patient voiced understanding. Reviewed common triggers for migraines. She did discuss work has been stressful for last several months. She has follow up with PCP scheduled for Monday 11/16/2020. She will schedule ENT appointment with Dr. Rama Vázquez. Next outreach planned around 11/20/20.  PAC

## 2020-11-16 ENCOUNTER — OFFICE VISIT (OUTPATIENT)
Dept: INTERNAL MEDICINE CLINIC | Age: 65
End: 2020-11-16
Payer: COMMERCIAL

## 2020-11-16 VITALS
HEART RATE: 79 BPM | BODY MASS INDEX: 29.76 KG/M2 | RESPIRATION RATE: 16 BRPM | OXYGEN SATURATION: 97 % | DIASTOLIC BLOOD PRESSURE: 80 MMHG | SYSTOLIC BLOOD PRESSURE: 122 MMHG | WEIGHT: 190 LBS | TEMPERATURE: 97.5 F

## 2020-11-16 DIAGNOSIS — G45.9 TIA (TRANSIENT ISCHEMIC ATTACK): ICD-10-CM

## 2020-11-16 DIAGNOSIS — G43.909 MIGRAINE WITHOUT STATUS MIGRAINOSUS, NOT INTRACTABLE, UNSPECIFIED MIGRAINE TYPE: ICD-10-CM

## 2020-11-16 DIAGNOSIS — J30.9 ALLERGIC RHINITIS, UNSPECIFIED SEASONALITY, UNSPECIFIED TRIGGER: ICD-10-CM

## 2020-11-16 DIAGNOSIS — Z09 HOSPITAL DISCHARGE FOLLOW-UP: Primary | ICD-10-CM

## 2020-11-16 DIAGNOSIS — K11.8 MASS OF RIGHT PAROTID GLAND: ICD-10-CM

## 2020-11-16 PROCEDURE — 99215 OFFICE O/P EST HI 40 MIN: CPT | Performed by: NURSE PRACTITIONER

## 2020-11-16 RX ORDER — AZELASTINE 1 MG/ML
SPRAY, METERED NASAL
Qty: 1 BOTTLE | Refills: 1 | Status: SHIPPED | OUTPATIENT
Start: 2020-11-16 | End: 2021-05-11 | Stop reason: SDUPTHER

## 2020-11-16 NOTE — PROGRESS NOTES
Ms. Eliza Mccartney is a 63y.o. year old female, she is seen today for Transition of Care services following a hospital discharge for TIA on 11/12/2020. Nurse Navigator, Ton Dykes RN performed an outreach to Ms. Stacey Gooden on 11/13/2020 (within 2 business days of discharge) to complete medication reconciliation and a telephonic assessment of her condition. She is going to call for appointment with ENT (Dr. Danielle Juarez recommended by hospital) as advised for fol up of incidental finding of a R Parotid abnormality. This was advised for 4 weeks. No Neurologist fol up was suggested. Studies revealed no specific findings otherwise, and Migraine was thought to be the operative Dx. She was prescribed Fioricet, which she reports does help. Since her hospitalization for TIA, she has continued to have \"pressure and banging\" in R lower posterior head along with feeling of intermittent dysequilibrium and decrease in cognition. Her job is very stressful (a ), and she already has an inordinate number of emails to address. She reports she really likes her job, but is dreading going back. She is continuing to smoke 15 cigs/day, but does want to try Chantix for stopping this habit. PE: Overweight WF is tearful though-out our encounter   BP = 122/80   Heart - 79/M and regular     Imp: Hospital discharge follow up    TIA - possible Migraine variant - continuing sxs   R Parotid Abnormality     Plan: She will check with her job about FMLA, etc - not back to work until that conversation   Refer to Neurology   She will schedule with ENT for further eval of parotid finding   Next scheduled visit with Dr. Jayson Bernal Jan 25  _____________________________  Expected course of current diagnosed problem(s) as well as expected progression and possible complications, and desired follow up with provider are discussed with patient. Patient is encouraged to be back in touch with any questions or concerns.     Patient expresses understanding of plan of care. Patient is given AVS which includes diagnoses, current medications, vitals.

## 2020-11-16 NOTE — PROGRESS NOTES
Verified name and birth date for privacy precautions. Chart reviewed in preparation for today's visit. Chief Complaint   Patient presents with   [address] Maintenance Due   Topic    Pneumococcal 0-64 years (1 of 1 - PPSV23)    Shingrix Vaccine Age 50> (1 of 2)    PAP AKA CERVICAL CYTOLOGY     Breast Cancer Screen Mammogram          Wt Readings from Last 3 Encounters:   11/16/20 190 lb (86.2 kg)   11/12/20 194 lb 3.6 oz (88.1 kg)   11/11/20 190 lb (86.2 kg)     Temp Readings from Last 3 Encounters:   11/16/20 97.5 °F (36.4 °C) (Temporal)   11/12/20 98.3 °F (36.8 °C)   11/11/20 97.8 °F (36.6 °C)     BP Readings from Last 3 Encounters:   11/16/20 122/80   11/12/20 (!) 140/86   11/11/20 (!) 143/93     Pulse Readings from Last 3 Encounters:   11/16/20 79   11/12/20 80   11/11/20 80         Learning Assessment:  :     Learning Assessment 7/8/2019 5/15/2015 3/25/2014   PRIMARY LEARNER Patient Patient Patient   HIGHEST LEVEL OF EDUCATION - PRIMARY LEARNER  > 4 YEARS OF COLLEGE > 4 YEARS OF COLLEGE 4 YEARS OF COLLEGE   BARRIERS PRIMARY LEARNER NONE NONE NONE   CO-LEARNER CAREGIVER No No No   PRIMARY LANGUAGE ENGLISH ENGLISH ENGLISH    NEED - No No   LEARNER PREFERENCE PRIMARY DEMONSTRATION DEMONSTRATION DEMONSTRATION     - PICTURES READING     - LISTENING LISTENING   LEARNING SPECIAL TOPICS - no no   ANSWERED BY patient  patient patient   RELATIONSHIP SELF SELF SELF       Depression Screening:  :     3 most recent PHQ Screens 11/16/2020   Little interest or pleasure in doing things Not at all   Feeling down, depressed, irritable, or hopeless Not at all   Total Score PHQ 2 0       Fall Risk Assessment:  :     Fall Risk Assessment, last 12 mths 11/16/2020   Able to walk? Yes   Fall in past 12 months?  No   Fall with injury? -   Number of falls in past 12 months -   Fall Risk Score -       Abuse Screening:  :     Abuse Screening Questionnaire 11/16/2020 7/8/2019 8/15/2018 5/15/2015 3/25/2014   Do you ever feel afraid of your partner? N N N N N   Are you in a relationship with someone who physically or mentally threatens you? N N N N N   Is it safe for you to go home?  Lennart Phoenix

## 2020-11-18 ENCOUNTER — TELEPHONE (OUTPATIENT)
Dept: NEUROLOGY | Age: 65
End: 2020-11-18

## 2020-11-19 ENCOUNTER — TELEPHONE (OUTPATIENT)
Dept: NEUROLOGY | Age: 65
End: 2020-11-19

## 2020-11-20 ENCOUNTER — TELEPHONE (OUTPATIENT)
Dept: INTERNAL MEDICINE CLINIC | Age: 65
End: 2020-11-20

## 2020-11-20 ENCOUNTER — TRANSCRIBE ORDER (OUTPATIENT)
Dept: SCHEDULING | Age: 65
End: 2020-11-20

## 2020-11-20 DIAGNOSIS — D11.0 BENIGN TUMOR OF PAROTID GLAND: Primary | ICD-10-CM

## 2020-11-20 NOTE — TELEPHONE ENCOUNTER
Patient calling to see if her FMLA forms she dropped off on Weds have been filled out and faxed to HR at [facility].

## 2020-11-23 NOTE — TELEPHONE ENCOUNTER
Pt calling in regards to appt, stated she has to be back at work on December 1st and would need to be seen before then. Stated when she does activity at home she has pounding in head and numbness on face, says she has to sit down and it takes about 30 minutes to pass. Concerned if this happens at work, fast paced high stressed job. Pt has been out of work since hospital stay. Needing to get a handle on why these episode come on.

## 2020-11-24 ENCOUNTER — OFFICE VISIT (OUTPATIENT)
Dept: NEUROLOGY | Age: 65
End: 2020-11-24
Payer: COMMERCIAL

## 2020-11-24 ENCOUNTER — PATIENT OUTREACH (OUTPATIENT)
Dept: CASE MANAGEMENT | Age: 65
End: 2020-11-24

## 2020-11-24 ENCOUNTER — DOCUMENTATION ONLY (OUTPATIENT)
Dept: NEUROLOGY | Age: 65
End: 2020-11-24

## 2020-11-24 VITALS
RESPIRATION RATE: 18 BRPM | HEART RATE: 85 BPM | BODY MASS INDEX: 29.82 KG/M2 | WEIGHT: 190 LBS | HEIGHT: 67 IN | DIASTOLIC BLOOD PRESSURE: 80 MMHG | OXYGEN SATURATION: 94 % | SYSTOLIC BLOOD PRESSURE: 104 MMHG

## 2020-11-24 DIAGNOSIS — M54.2 CERVICALGIA: Primary | ICD-10-CM

## 2020-11-24 DIAGNOSIS — R20.0 NUMBNESS AND TINGLING OF RIGHT ARM: ICD-10-CM

## 2020-11-24 DIAGNOSIS — R51.9 OCCIPITAL PAIN: ICD-10-CM

## 2020-11-24 DIAGNOSIS — R20.2 NUMBNESS AND TINGLING OF RIGHT ARM: ICD-10-CM

## 2020-11-24 PROCEDURE — 99214 OFFICE O/P EST MOD 30 MIN: CPT | Performed by: PSYCHIATRY & NEUROLOGY

## 2020-11-24 RX ORDER — CYCLOBENZAPRINE HCL 5 MG
TABLET ORAL
Qty: 60 TAB | Refills: 0 | Status: SHIPPED | OUTPATIENT
Start: 2020-11-24 | End: 2020-12-08

## 2020-11-24 NOTE — PATIENT INSTRUCTIONS
[facility]   Statement to Patients  April 1, 2014      In an effort to ensure the large volume of patient prescription refills is processed in the most efficient and expeditious manner, we are asking our patients to assist us by calling your Pharmacy for all prescription refills, this will include also your  Mail Order Pharmacy. The pharmacy will contact our office electronically to continue the refill process. Please do not wait until the last minute to call your pharmacy. We need at least 48 hours (2days) to fill prescriptions. We also encourage you to call your pharmacy before going to  your prescription to make sure it is ready. With regard to controlled substance prescription refill requests (narcotic refills) that need to be picked up at our office, we ask your cooperation by providing us with at least 72 hours (3days) notice that you will need a refill. We will not refill narcotic prescription refill requests after 4:00pm on any weekday, Monday through Thursday, or after 2:00pm on Fridays, or on the weekends. We encourage everyone to explore another way of getting your prescription refill request processed using [employer], our patient web portal through our electronic medical record system. [employer] is an efficient and effective way to communicate your medication request directly to the office and  downloadable as an atul on your smart phone . [employer] also features a review functionality that allows you to view your medication list as well as leave messages for your physician. Are you ready to get connected? If so please review the attatched instructions or speak to any of our staff to get you set up right away! Thank you so much for your cooperation. Should you have any questions please contact our Practice Administrator.     The Physicians and Staff,  [facility]

## 2020-11-24 NOTE — PROGRESS NOTES
Chief Complaint   Patient presents with    TIA         HISTORY OF PRESENT ILLNESS  Sinai Her is a 63 y.o. female who was recently  admitted to the hospital on November 11, 2020. For the previous 2 weeks she had not been feeling right. Her hand has been feeling foggy and heavy, her balance has not been great and she has been having some pain in the right occipital region. The pain got really intense and she was feeling numbness on the top of her shoulder, she was feeling somewhat confused and presented to the emergency department she had an MRI scan of the brain which was unremarkable. She was suspected to have a migraine variant and was discharged. Has not been using any analgesics on a regular basis. She was also found to have a parathyroid nodule and is scheduled for a fine-needle biopsy next week. Overall, she is feeling slightly better but certain movements and postures were aggravate the right occipital pain, stiffness in her neck and numbness over the shoulder on the right. Denies any weakness in the arms. No symptoms in the lower extremities. 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 She has not been able to return to work as yet but does plan to go back next week. Past Medical History:   Diagnosis Date    Anxiety     Dermatitis     atypical    Fibrocystic breast     Hemorrhoid     History of kidney stones     Last times was circa 2004.  History of vertigo      Current Outpatient Medications   Medication Sig    cyclobenzaprine (FLEXERIL) 5 mg tablet 1 in Am 2 at night    TURMERIC PO Take  by mouth.  azelastine (ASTELIN) 137 mcg (0.1 %) nasal spray INSTILL 1 SPRAY BY BOTH NOSTRILS ROUTE TWO (2) TIMES A DAY.  therapeutic multivitamin (THERAGRAN) tablet Take 1 Tab by mouth daily.  fluticasone propionate (FLONASE) 50 mcg/actuation nasal spray 2 Sprays by Both Nostrils route daily.  cholecalciferol (VITAMIN D3) 1,000 unit tablet Take 1,000 Units by mouth daily.  VITAMIN B COMPLEX (B COMPLEX 1 PO) Take 1 Cap by mouth daily. No current facility-administered medications for this visit. Allergies   Allergen Reactions    Sulfa (Sulfonamide Antibiotics) Hives     1 week after starting Bactrim - 2016    Clindamycin Rash    Keflex [Cephalexin] Rash    Morphine Other (comments)     Dysphoria/anxiety    Pcn [Penicillins] Rash     Family History   Problem Relation Age of Onset    Arthritis-osteo Mother     Cancer Father         lung -heavy smoker    Cancer Maternal Aunt         breast    Breast Cancer Maternal Aunt     Breast Cancer Maternal Aunt      Social History     Tobacco Use    Smoking status: Current Every Day Smoker     Packs/day: 0.50     Years: 35.00     Pack years: 17.50     Types: Cigarettes    Smokeless tobacco: Never Used   Substance Use Topics    Alcohol use: Yes     Alcohol/week: 3.3 standard drinks     Types: 4 Glasses of wine per week    Drug use: No     Past Surgical History:   Procedure Laterality Date    HX GYN       x2    HX ORTHOPAEDIC      cyst removed left 3rd digit - Dr Oscar Thurston HX OTHER SURGICAL  10/28/2011    Incision and drainage of septoc left 3rd finger joint - catbite    HX OTHER SURGICAL  2014    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          REVIEW OF SYSTEMS  Review of Systems - History obtained from the patient  Psychological ROS: negative  ENT ROS: negative  Hematological and Lymphatic ROS: negative  Endocrine ROS: negative  Respiratory ROS: no cough, shortness of breath, or wheezing  Cardiovascular ROS: no chest pain or dyspnea on exertion  Gastrointestinal ROS: no abdominal pain, change in bowel habits, or black or bloody stools  Genito-Urinary ROS: no dysuria, trouble voiding, or hematuria  Musculoskeletal ROS: negative  Dermatological ROS: negative      PHYSICAL EXAMINATION:    Visit Vitals  /80   Pulse 85   Resp 18   Ht 5' 7\" (1.702 m)   Wt 190 lb (86.2 kg)   SpO2 94% BMI 29.76 kg/m²     General:  Well nourished and groomed individual in no acute distress. Neck: Supple, nontender, no bruits, no pain with resistance to active range of motion. Heart: Regular rate and rhythm. Normal S1S2. Lungs:  Equal chest expansion, no cough, no wheeze  Musculoskeletal:  Extremities revealed no edema and had full range of motion of joints. Psych:  Good mood and bright affect    NEUROLOGICAL EXAMINATION:     Mental Status:   Alert and oriented to person, place, and time with recent and remote memory intact. Attention span and concentration are normal. Speech is fluent. Cranial Nerves:    II, III, IV, VI:  Visual acuity grossly intact. Visual fields are normal.    Pupils are equal, round, and reactive to light and accommodation. Extra-ocular movements are full and fluid. Fundoscopic exam was benign, no ptosis or nystagmus. V-XII: Hearing is grossly intact. Facial features are symmetric, with normal sensation and strength. The palate rises symmetrically and the tongue protrudes midline. Sternocleidomastoids 5/5. Motor Examination: Normal tone, bulk, and strength. 5/5 muscle strength throughout. No cogwheel rigidity or clonus present. Sensory exam:  Normal throughout to pinprick, temperature, and vibration sense. Normal proprioception. Coordination:  Finger to nose and rapid arm movement testing was normal.   No resting or intention tremor    Gait and Station:  Steady while walking on toes, heels, and with tandem walking. Normal arm swing. No Rhomberg or pronator drift. No muscle wasting or fasiculations noted. Reflexes:  DTRs 2+ throughout. Toes downgoing.       She has tenderness to deep palpation in the right suboccipital region and neck range of motion movements aggravate her symptoms      LABS / IMAGING  MRI Results (most recent):  Results from Hospital Encounter encounter on 11/11/20   MRI BRAIN WO CONT    Narrative CLINICAL HISTORY: AMS.    INDICATION: AMS. COMPARISON: CT head 11/11/2020    TECHNIQUE: MR examination of the brain includes axial and sagittal T1, coronal  T2, axial T2, axial FLAIR, axial gradient echo, axial DWI. CONTRAST: None    FINDINGS:   There is no intracranial mass, hemorrhage or evidence of acute infarction. IACs are symmetric in size. Trace fluid in the mastoid air cells on the right. Trace mucoperiosteal thickening in the right maxillary sinus. T2 hyperintense  lesion in the right parotid gland. The brain architecture is normal. There is no evidence of midline shift or  mass-effect. There are no extra-axial fluid collections. There is no Chiari or  syrinx. The pituitary and infundibulum are grossly unremarkable. There is no  skull base mass. Cerebellopontine angles are grossly unremarkable. The major  intracranial vascular flow-voids are unremarkable. The cavernous sinuses are  symmetric. Optic chiasm and infundibulum grossly unremarkable. Orbits are  grossly symmetric. Dural venous sinuses are grossly patent. The mastoid air cells are well pneumatized and clear. Impression IMPRESSION:  Normal MRI of the brain. There is no intracranial mass, hemorrhage or evidence of acute infarction. No acute intracranial process is demonstrated. Right parotid lesion. 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 Nonemergent outpatient ENT consultation and parotid ultrasound for further  delineation recommended. ESR normal, B12 normal    ASSESSMENT    ICD-10-CM ICD-9-CM    1. Cervicalgia  M54.2 723.1 cyclobenzaprine (FLEXERIL) 5 mg tablet      REFERRAL TO PHYSICAL THERAPY   2. Numbness and tingling of right arm  R20.0 782.0 cyclobenzaprine (FLEXERIL) 5 mg tablet    R20.2  REFERRAL TO PHYSICAL THERAPY   3. Occipital pain  R51.9 784.0        DISCUSSION  Ms. Georgina Samaniego appears to have cervicalgia and possibly cervicogenic headache with a component of occipital neuralgia  This may be related to upper cervical arthritis on the right which can also cause numbness of the right shoulder and pain in the occipital region  She should try a course of physical therapy for neck  Take cyclobenzaprine 5 mg in the morning and 10 mg at bedtime  If she is not better in the next few weeks, we may consider MRI of the cervical spine and possibly a localized nerve block    Yuri Peace MD  Diplomate, American Board of Psychiatry & Neurology (Neurology)  Diplomate, American Board of Psychiatry & Neurology (Clinical Neurophysiology)  Diplomate, American Board of Electrodiagnostic Medicine

## 2020-11-25 ENCOUNTER — TRANSCRIBE ORDER (OUTPATIENT)
Dept: REGISTRATION | Age: 65
End: 2020-11-25

## 2020-11-25 ENCOUNTER — HOSPITAL ENCOUNTER (OUTPATIENT)
Dept: MAMMOGRAPHY | Age: 65
Discharge: HOME | End: 2020-11-25
Attending: INTERNAL MEDICINE
Payer: COMMERCIAL

## 2020-11-25 DIAGNOSIS — Z12.31 VISIT FOR SCREENING MAMMOGRAM: ICD-10-CM

## 2020-11-25 DIAGNOSIS — Z12.31 VISIT FOR SCREENING MAMMOGRAM: Primary | ICD-10-CM

## 2020-11-25 PROCEDURE — 77063 BREAST TOMOSYNTHESIS BI: CPT

## 2020-11-25 NOTE — PROGRESS NOTES
Goals        Post Hospitalization     Understands red flags post discharge. 11/24/2020 Unalble to reach patient by phone, messages not returned. Noted in EMR patient has attended PCP and follow up appointments. Next outreach planned for week of 12/7/20. [facility]    11/13/2020 Patient reports she is feeling better today. She did develop a slight headache last night. She took Fi[place]cett as directed, headache was relieved, and slept for 12 hours. Discussed need to see ENT for parotid lesion noted on MRI. Patient does have tenderness in right parotid area which is the same side where headache begins. Reviewed discharge instructions, red flags for stroke, when to call 911, patient voiced understanding. Reviewed common triggers for migraines. She did discuss work has been stressful for last several months. She has follow up with PCP scheduled for Monday 11/16/2020. She will schedule ENT appointment with Dr. Jake Kovacs. Next outreach planned around 11/20/20.  PAC

## 2020-11-30 ENCOUNTER — HOSPITAL ENCOUNTER (OUTPATIENT)
Dept: ULTRASOUND IMAGING | Age: 65
Discharge: HOME | End: 2020-11-30
Attending: OTOLARYNGOLOGY
Payer: COMMERCIAL

## 2020-11-30 DIAGNOSIS — D11.0 BENIGN TUMOR OF PAROTID GLAND: ICD-10-CM

## 2020-11-30 PROCEDURE — 88172 CYTP DX EVAL FNA 1ST EA SITE: CPT

## 2020-11-30 PROCEDURE — 88305 TISSUE EXAM BY PATHOLOGIST: CPT

## 2020-11-30 PROCEDURE — 10005 FNA BX W/US GDN 1ST LES: CPT

## 2020-11-30 PROCEDURE — 88173 CYTOPATH EVAL FNA REPORT: CPT

## 2020-11-30 PROCEDURE — 77030014115

## 2020-11-30 RX ORDER — LIDOCAINE HYDROCHLORIDE 10 MG/ML
10 INJECTION, SOLUTION EPIDURAL; INFILTRATION; INTRACAUDAL; PERINEURAL
Status: COMPLETED | OUTPATIENT
Start: 2020-11-30 | End: 2020-11-30

## 2020-11-30 RX ADMIN — LIDOCAINE HYDROCHLORIDE 5 ML: 10 INJECTION, SOLUTION EPIDURAL; INFILTRATION; INTRACAUDAL; PERINEURAL at 09:00

## 2020-12-01 ENCOUNTER — TELEPHONE (OUTPATIENT)
Dept: INTERNAL MEDICINE CLINIC | Age: 65
End: 2020-12-01

## 2020-12-01 NOTE — TELEPHONE ENCOUNTER
Pearl Burgos (Self) [phone] (W)     Pt is requesting a call back regarding papers neo needed to be sent to her HR department for her being out of work . She returned to work today and they have not received anything yet.

## 2020-12-02 ENCOUNTER — APPOINTMENT (OUTPATIENT)
Dept: CT IMAGING | Age: 65
End: 2020-12-02
Attending: EMERGENCY MEDICINE
Payer: COMMERCIAL

## 2020-12-02 ENCOUNTER — HOSPITAL ENCOUNTER (OUTPATIENT)
Age: 65
Setting detail: OBSERVATION
Discharge: HOME | End: 2020-12-03
Attending: EMERGENCY MEDICINE | Admitting: FAMILY MEDICINE
Payer: COMMERCIAL

## 2020-12-02 ENCOUNTER — TELEPHONE (OUTPATIENT)
Dept: INTERNAL MEDICINE CLINIC | Age: 65
End: 2020-12-02

## 2020-12-02 ENCOUNTER — APPOINTMENT (OUTPATIENT)
Dept: CT IMAGING | Age: 65
End: 2020-12-02
Attending: FAMILY MEDICINE
Payer: COMMERCIAL

## 2020-12-02 ENCOUNTER — TELEPHONE (OUTPATIENT)
Dept: NEUROLOGY | Age: 65
End: 2020-12-02

## 2020-12-02 DIAGNOSIS — R20.0 FACIAL NUMBNESS: Primary | ICD-10-CM

## 2020-12-02 DIAGNOSIS — R20.0 RIGHT ARM NUMBNESS: ICD-10-CM

## 2020-12-02 PROBLEM — I63.9 CVA (CEREBRAL VASCULAR ACCIDENT) (HCC): Status: ACTIVE | Noted: 2020-12-02

## 2020-12-02 LAB
ALBUMIN SERPL-MCNC: 3.8 G/DL (ref 3.5–5)
ALBUMIN/GLOB SERPL: 1 {RATIO} (ref 1.1–2.2)
ALP SERPL-CCNC: 94 U/L (ref 45–117)
ALT SERPL-CCNC: 48 U/L (ref 12–78)
ANION GAP SERPL CALC-SCNC: 8 MMOL/L (ref 5–15)
AST SERPL-CCNC: 28 U/L (ref 15–37)
BASOPHILS # BLD: 0.1 K/UL (ref 0–0.1)
BASOPHILS NFR BLD: 1 % (ref 0–1)
BILIRUB SERPL-MCNC: 0.3 MG/DL (ref 0.2–1)
BUN SERPL-MCNC: 12 MG/DL (ref 6–20)
BUN/CREAT SERPL: 16 (ref 12–20)
CALCIUM SERPL-MCNC: 9.3 MG/DL (ref 8.5–10.1)
CHLORIDE SERPL-SCNC: 104 MMOL/L (ref 97–108)
CO2 SERPL-SCNC: 26 MMOL/L (ref 21–32)
COMMENT, HOLDF: NORMAL
CREAT SERPL-MCNC: 0.74 MG/DL (ref 0.55–1.02)
DIFFERENTIAL METHOD BLD: NORMAL
EOSINOPHIL # BLD: 0.2 K/UL (ref 0–0.4)
EOSINOPHIL NFR BLD: 2 % (ref 0–7)
ERYTHROCYTE [DISTWIDTH] IN BLOOD BY AUTOMATED COUNT: 11.9 % (ref 11.5–14.5)
GLOBULIN SER CALC-MCNC: 3.7 G/DL (ref 2–4)
GLUCOSE BLD STRIP.AUTO-MCNC: 156 MG/DL (ref 65–100)
GLUCOSE SERPL-MCNC: 151 MG/DL (ref 65–100)
HCT VFR BLD AUTO: 41.9 % (ref 35–47)
HGB BLD-MCNC: 14.5 G/DL (ref 11.5–16)
IMM GRANULOCYTES # BLD AUTO: 0 K/UL (ref 0–0.04)
IMM GRANULOCYTES NFR BLD AUTO: 0 % (ref 0–0.5)
INR PPP: 1 (ref 0.9–1.1)
LYMPHOCYTES # BLD: 2.2 K/UL (ref 0.8–3.5)
LYMPHOCYTES NFR BLD: 33 % (ref 12–49)
MCH RBC QN AUTO: 32.4 PG (ref 26–34)
MCHC RBC AUTO-ENTMCNC: 34.6 G/DL (ref 30–36.5)
MCV RBC AUTO: 93.7 FL (ref 80–99)
MONOCYTES # BLD: 0.4 K/UL (ref 0–1)
MONOCYTES NFR BLD: 6 % (ref 5–13)
NEUTS SEG # BLD: 3.8 K/UL (ref 1.8–8)
NEUTS SEG NFR BLD: 58 % (ref 32–75)
NRBC # BLD: 0 K/UL (ref 0–0.01)
NRBC BLD-RTO: 0 PER 100 WBC
PLATELET # BLD AUTO: 273 K/UL (ref 150–400)
PMV BLD AUTO: 9.2 FL (ref 8.9–12.9)
POTASSIUM SERPL-SCNC: 3.4 MMOL/L (ref 3.5–5.1)
PROT SERPL-MCNC: 7.5 G/DL (ref 6.4–8.2)
PROTHROMBIN TIME: 10.1 SEC (ref 9–11.1)
RBC # BLD AUTO: 4.47 M/UL (ref 3.8–5.2)
SAMPLES BEING HELD,HOLD: NORMAL
SERVICE CMNT-IMP: ABNORMAL
SODIUM SERPL-SCNC: 138 MMOL/L (ref 136–145)
TROPONIN I SERPL-MCNC: <0.05 NG/ML
WBC # BLD AUTO: 6.6 K/UL (ref 3.6–11)

## 2020-12-02 PROCEDURE — 85610 PROTHROMBIN TIME: CPT

## 2020-12-02 PROCEDURE — 82962 GLUCOSE BLOOD TEST: CPT

## 2020-12-02 PROCEDURE — 74011000636 HC RX REV CODE- 636: Performed by: EMERGENCY MEDICINE

## 2020-12-02 PROCEDURE — 0042T CT PERF W CBF: CPT

## 2020-12-02 PROCEDURE — 99218 HC RM OBSERVATION: CPT

## 2020-12-02 PROCEDURE — 99285 EMERGENCY DEPT VISIT HI MDM: CPT

## 2020-12-02 PROCEDURE — 80053 COMPREHEN METABOLIC PANEL: CPT

## 2020-12-02 PROCEDURE — 87040 BLOOD CULTURE FOR BACTERIA: CPT

## 2020-12-02 PROCEDURE — 74011000258 HC RX REV CODE- 258: Performed by: FAMILY MEDICINE

## 2020-12-02 PROCEDURE — 85025 COMPLETE CBC W/AUTO DIFF WBC: CPT

## 2020-12-02 PROCEDURE — 94760 N-INVAS EAR/PLS OXIMETRY 1: CPT

## 2020-12-02 PROCEDURE — 70450 CT HEAD/BRAIN W/O DYE: CPT

## 2020-12-02 PROCEDURE — 96372 THER/PROPH/DIAG INJ SC/IM: CPT

## 2020-12-02 PROCEDURE — 93005 ELECTROCARDIOGRAM TRACING: CPT

## 2020-12-02 PROCEDURE — 82550 ASSAY OF CK (CPK): CPT

## 2020-12-02 PROCEDURE — 84484 ASSAY OF TROPONIN QUANT: CPT

## 2020-12-02 PROCEDURE — 96375 TX/PRO/DX INJ NEW DRUG ADDON: CPT

## 2020-12-02 PROCEDURE — 74011250637 HC RX REV CODE- 250/637: Performed by: FAMILY MEDICINE

## 2020-12-02 PROCEDURE — 96374 THER/PROPH/DIAG INJ IV PUSH: CPT

## 2020-12-02 PROCEDURE — 70496 CT ANGIOGRAPHY HEAD: CPT

## 2020-12-02 PROCEDURE — 74011250636 HC RX REV CODE- 250/636: Performed by: FAMILY MEDICINE

## 2020-12-02 PROCEDURE — 36415 COLL VENOUS BLD VENIPUNCTURE: CPT

## 2020-12-02 RX ORDER — ACETAMINOPHEN 325 MG/1
650 TABLET ORAL
Status: DISCONTINUED | OUTPATIENT
Start: 2020-12-02 | End: 2020-12-03 | Stop reason: HOSPADM

## 2020-12-02 RX ORDER — THERA TABS 400 MCG
1 TAB ORAL DAILY
Status: DISCONTINUED | OUTPATIENT
Start: 2020-12-03 | End: 2020-12-03 | Stop reason: HOSPADM

## 2020-12-02 RX ORDER — METRONIDAZOLE 500 MG/100ML
500 INJECTION, SOLUTION INTRAVENOUS EVERY 12 HOURS
Status: DISCONTINUED | OUTPATIENT
Start: 2020-12-02 | End: 2020-12-03 | Stop reason: HOSPADM

## 2020-12-02 RX ORDER — FAMOTIDINE 20 MG/1
20 TABLET, FILM COATED ORAL EVERY 12 HOURS
Status: DISCONTINUED | OUTPATIENT
Start: 2020-12-02 | End: 2020-12-03 | Stop reason: HOSPADM

## 2020-12-02 RX ORDER — ATORVASTATIN CALCIUM 40 MG/1
80 TABLET, FILM COATED ORAL
Status: DISCONTINUED | OUTPATIENT
Start: 2020-12-02 | End: 2020-12-03 | Stop reason: HOSPADM

## 2020-12-02 RX ORDER — SODIUM CHLORIDE 0.9 % (FLUSH) 0.9 %
10 SYRINGE (ML) INJECTION
Status: COMPLETED | OUTPATIENT
Start: 2020-12-02 | End: 2020-12-02

## 2020-12-02 RX ORDER — SODIUM CHLORIDE 9 MG/ML
75 INJECTION, SOLUTION INTRAVENOUS CONTINUOUS
Status: DISCONTINUED | OUTPATIENT
Start: 2020-12-02 | End: 2020-12-03 | Stop reason: HOSPADM

## 2020-12-02 RX ORDER — HEPARIN SODIUM 5000 [USP'U]/ML
5000 INJECTION, SOLUTION INTRAVENOUS; SUBCUTANEOUS EVERY 8 HOURS
Status: DISCONTINUED | OUTPATIENT
Start: 2020-12-02 | End: 2020-12-03 | Stop reason: HOSPADM

## 2020-12-02 RX ORDER — FLUTICASONE PROPIONATE 50 MCG
2 SPRAY, SUSPENSION (ML) NASAL DAILY
Status: DISCONTINUED | OUTPATIENT
Start: 2020-12-03 | End: 2020-12-03 | Stop reason: HOSPADM

## 2020-12-02 RX ORDER — ACETAMINOPHEN 650 MG/1
650 SUPPOSITORY RECTAL
Status: DISCONTINUED | OUTPATIENT
Start: 2020-12-02 | End: 2020-12-03 | Stop reason: HOSPADM

## 2020-12-02 RX ORDER — GUAIFENESIN 100 MG/5ML
81 LIQUID (ML) ORAL DAILY
Status: DISCONTINUED | OUTPATIENT
Start: 2020-12-03 | End: 2020-12-03 | Stop reason: HOSPADM

## 2020-12-02 RX ORDER — METRONIDAZOLE 500 MG/100ML
500 INJECTION, SOLUTION INTRAVENOUS EVERY 8 HOURS
Status: DISCONTINUED | OUTPATIENT
Start: 2020-12-02 | End: 2020-12-02 | Stop reason: DRUGHIGH

## 2020-12-02 RX ADMIN — AZTREONAM 2 G: 2 INJECTION, POWDER, LYOPHILIZED, FOR SOLUTION INTRAMUSCULAR; INTRAVENOUS at 21:50

## 2020-12-02 RX ADMIN — SODIUM CHLORIDE 75 ML/HR: 900 INJECTION, SOLUTION INTRAVENOUS at 19:19

## 2020-12-02 RX ADMIN — ATORVASTATIN CALCIUM 80 MG: 40 TABLET, FILM COATED ORAL at 21:43

## 2020-12-02 RX ADMIN — IOPAMIDOL 120 ML: 755 INJECTION, SOLUTION INTRAVENOUS at 15:02

## 2020-12-02 RX ADMIN — HEPARIN SODIUM 5000 UNITS: 5000 INJECTION INTRAVENOUS; SUBCUTANEOUS at 21:43

## 2020-12-02 RX ADMIN — METRONIDAZOLE 500 MG: 500 INJECTION, SOLUTION INTRAVENOUS at 19:19

## 2020-12-02 RX ADMIN — Medication 10 ML: at 21:43

## 2020-12-02 RX ADMIN — FAMOTIDINE 20 MG: 20 TABLET, FILM COATED ORAL at 21:45

## 2020-12-02 NOTE — PROGRESS NOTES
Clinical Pharmacy Note: Metronidazole Dosing    Please note that the metronidazole dose for Perla Alejandro has been changed to 500 mg q12h per [facility]-approved protocol. Please contact the pharmacy with any questions.     Katrina Painter [facility]

## 2020-12-02 NOTE — ED NOTES
Patient reports numbness and tingling to right neck, and intermittent tingling to thumb and index finger of right hand. Patient also reports intermittent \"clamping pressure\"to right posterior of head. Denies tingling to lower extremities.

## 2020-12-02 NOTE — ED TRIAGE NOTES
Patient reports pressure to the right side of the head that began yesterday at 6 pm. She has swelling to the side of her face. Biopsy two days ago on Right parotid gland. She is having intermittent right arm numbness and right mouth numbness.

## 2020-12-02 NOTE — TELEPHONE ENCOUNTER
Verified patient identity with two identifiers. Spoke with patient by phone. Pt had right biopsy on neck 11/30/20 with Dr. Dinah Lazo. She states biopsy site is warm, red, hard, with tightness and numbness. Denies fever. This started yesterday. Per  she should not hesitate to be seen urgently in the ER but also call ENT office to alert of issue and ask to be seen. Pt denies facial drooping, slurring words and weakness. Patient verbalized understanding.

## 2020-12-02 NOTE — PROGRESS NOTES
Neurocritical Care Code Stroke Documentation    Symptoms:   Numbness to right face, arm and leg   Last Known Well:   18 yesterday   Medical hx:    anxiety, dermatitis, fibrocystic breast, hemorrhoids, kidney stones, and previous episodes of vertigo   Anticoagulation:   None   VAN:   Negative   NIHSS:   1a-LOC:0    1b-Month/Age:0    1c-Open/Close Hand:0    2-Best Gaze:0    3-Visual Fields:0    4-Facial Palsy:0    5a-Left Arm:0    5b-Right Arm:0    6a-Left Le    6b-Right Le    7-Limb Ataxia:0    8-Sensory:2    9-Best Language:0    10-Dysarthria:0    11-Extinction/Inattention:0  TOTAL SCORE:2   Imaging:   CT head negative for acute process. Plan:   TPA Candidate: NO     Discussed with: Dr. Maricruz Carolina    Time spent: 25 minutes.      Nicho Smith NP  Neurocritical Care Nurse Practitioner  [phone]

## 2020-12-02 NOTE — TELEPHONE ENCOUNTER
Patient called reported numbness and tingling and a strange sensation in her mouth. She stated she has had a recent TIA. Referred to ED for evaluation.

## 2020-12-02 NOTE — H&P
History & Physical    Primary Care Provider: Reginaldo Ren MD  Source of Information: Patient     History of Presenting Illness:   Rebecca Cardoza is a 63 y.o. female who presents with right facial numbness. History was primarily obtained from the patient. Patient reports that on Monday she had a parotid biopsy. Patient reports that she did well after the same. Patient reports on Tuesday she started noticing some tingling and numbness on the right side of her face. Patient reports today she was driving and noticed that she had numbness and tingling on the right side of her face and going down into her arm. Patient reports that she had a TIA in November and had been seeing Dr. Catie Valencia and was told that she may have arthritis causing her symptoms. Patient reports today she also felt \"weird\" when she walked and she got concerned and decided to come to the hospital.  Patient was deemed as a code stroke on arrival.  Patient reports the area that was biopsied feels hot and tender and she is concerned that she may be developing an infection. Patient denies any other complaints or problems. The patient denies any Headache, blurry vision, sore throat, trouble swallowing, trouble with speech, chest pain, SOB, cough, fever, chills, N/V/D, abd pain, urinary symptoms, constipation, recent travels, sick contacts,  falls, injuries, rashes, contact with COVID-19 diagnosed patients, hematemesis, melena, hemoptysis, hematuria, rashes, denies starting any new medications and denies any other concerns or problems besides as mentioned above. Review of Systems:  A comprehensive review of systems was negative except for that written in the History of Present Illness. Past Medical History:   Diagnosis Date    Anxiety     Dermatitis     atypical    Fibrocystic breast     Hemorrhoid     History of kidney stones     Last times was circa 2004.    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 Prior to Admission medications    Medication Sig Start Date End Date Taking? Authorizing Provider   cyclobenzaprine (FLEXERIL) 5 mg tablet 1 in Am 2 at night 20   Cami uV MD   TURMERIC PO Take  by mouth. Provider, Historical   azelastine (ASTELIN) 137 mcg (0.1 %) nasal spray INSTILL 1 SPRAY BY BOTH NOSTRILS ROUTE TWO (2) TIMES A DAY. 20   Katey Lin NP   therapeutic multivitamin [facility]) tablet Take 1 Tab by mouth daily. Provider, Historical   fluticasone propionate (FLONASE) 50 mcg/actuation nasal spray 2 Sprays by Both Nostrils route daily. 19   Moshe COLEY NP   cholecalciferol (VITAMIN D3) 1,000 unit tablet Take 1,000 Units by mouth daily. Provider, Historical   VITAMIN B COMPLEX (B COMPLEX 1 PO) Take 1 Cap by mouth daily.     Provider, Historical     Allergies   Allergen Reactions    Sulfa (Sulfonamide Antibiotics) Hives     1 week after starting Bactrim - 2016    Clindamycin Rash    Keflex [Cephalexin] Rash    Morphine Other (comments)     Dysphoria/anxiety    Pcn [Penicillins] Rash      Family History   Problem Relation Age of Onset    Arthritis-osteo Mother     Cancer Father         lung -heavy smoker    Cancer Maternal Aunt         breast    Breast Cancer Maternal Aunt     Breast Cancer Maternal Aunt         SOCIAL HISTORY:  Patient resides:  Independently x   Assisted Living    SNF    With family care       Smoking history:   None    Former    Chronic x     Alcohol history:   None    Social x   Chronic      Ambulates:   Independently x   w/cane    w/walker    w/wc    CODE STATUS:  DNR    Full x   Other Objective:     Physical Exam:     Visit Vitals  BP (!) 162/97 (BP 1 Location: Left arm, BP Patient Position: At rest)   Pulse 91   Temp 98 °F (36.7 °C)   Resp 18   LMP 10/28/2002 (Exact Date) Comment: age 54   SpO2 97%      O2 Device: Room air    General : alert x 3, awake, no acute distress, resting in bed, pleasant female, appears to be stated age  [de-identified]: PEERL, EOMI, moist mucus membrane, TM clear, there is swelling and tenderness on the right parotid angle  Neck: supple, no JVD, no meningeal signs  Chest: Decreased basal breath  CVS: S1 S2 heard, Capillary refill less than 2 seconds  Abd: soft/ Non tender, non distended, BS physiological,   Ext: no clubbing, no cyanosis, no edema, brisk 2+ DP pulses  Neuro/Psych: pleasant mood and affect, CN 2-12 grossly intact, sensory grossly within normal limit, Strength 5/5 in all extremities, DTR 1+ x 4  Skin: warm      EKG: Pending      Data Review:     Recent Days:  Recent Labs     12/02/20  1437   WBC 6.6   HGB 14.5   HCT 41.9        Recent Labs     12/02/20  1437      K 3.4*      CO2 26   *   BUN 12   CREA 0.74   CA 9.3   ALB 3.8   ALT 48   INR 1.0     No results for input(s): PH, PCO2, PO2, HCO3, FIO2 in the last 72 hours.     24 Hour Results:  Recent Results (from the past 24 hour(s))   GLUCOSE, POC    Collection Time: 12/02/20  2:32 PM   Result Value Ref Range    Glucose (POC) 156 (H) 65 - 100 mg/dL    Performed by Ruth Lemon    CBC WITH AUTOMATED DIFF    Collection Time: 12/02/20  2:37 PM   Result Value Ref Range    WBC 6.6 3.6 - 11.0 K/uL    RBC 4.47 3.80 - 5.20 M/uL    HGB 14.5 11.5 - 16.0 g/dL    HCT 41.9 35.0 - 47.0 %    MCV 93.7 80.0 - 99.0 FL    MCH 32.4 26.0 - 34.0 PG    MCHC 34.6 30.0 - 36.5 g/dL    RDW 11.9 11.5 - 14.5 %    PLATELET 866 060 - 310 K/uL    MPV 9.2 8.9 - 12.9 FL    NRBC 0.0 0  WBC    ABSOLUTE NRBC 0.00 0.00 - 0.01 K/uL    NEUTROPHILS 58 32 - 75 %    LYMPHOCYTES 33 12 - 49 %    MONOCYTES 6 5 - 13 % EOSINOPHILS 2 0 - 7 %    BASOPHILS 1 0 - 1 %    IMMATURE GRANULOCYTES 0 0.0 - 0.5 %    ABS. NEUTROPHILS 3.8 1.8 - 8.0 K/UL    ABS. LYMPHOCYTES 2.2 0.8 - 3.5 K/UL    ABS. MONOCYTES 0.4 0.0 - 1.0 K/UL    ABS. EOSINOPHILS 0.2 0.0 - 0.4 K/UL    ABS. BASOPHILS 0.1 0.0 - 0.1 K/UL    ABS. IMM. GRANS. 0.0 0.00 - 0.04 K/UL    DF AUTOMATED     METABOLIC PANEL, COMPREHENSIVE    Collection Time: 12/02/20  2:37 PM   Result Value Ref Range    Sodium 138 136 - 145 mmol/L    Potassium 3.4 (L) 3.5 - 5.1 mmol/L    Chloride 104 97 - 108 mmol/L    CO2 26 21 - 32 mmol/L    Anion gap 8 5 - 15 mmol/L    Glucose 151 (H) 65 - 100 mg/dL    BUN 12 6 - 20 MG/DL    Creatinine 0.74 0.55 - 1.02 MG/DL    BUN/Creatinine ratio 16 12 - 20      GFR est AA >60 >60 ml/min/1.73m2    GFR est non-AA >60 >60 ml/min/1.73m2    Calcium 9.3 8.5 - 10.1 MG/DL    Bilirubin, total 0.3 0.2 - 1.0 MG/DL    ALT (SGPT) 48 12 - 78 U/L    AST (SGOT) 28 15 - 37 U/L    Alk. phosphatase 94 45 - 117 U/L    Protein, total 7.5 6.4 - 8.2 g/dL    Albumin 3.8 3.5 - 5.0 g/dL    Globulin 3.7 2.0 - 4.0 g/dL    A-G Ratio 1.0 (L) 1.1 - 2.2     PROTHROMBIN TIME + INR    Collection Time: 12/02/20  2:37 PM   Result Value Ref Range    INR 1.0 0.9 - 1.1      Prothrombin time 10.1 9.0 - 11.1 sec   TROPONIN I    Collection Time: 12/02/20  2:37 PM   Result Value Ref Range    Troponin-I, Qt. <0.05 <0.05 ng/mL   SAMPLES BEING HELD    Collection Time: 12/02/20  2:37 PM   Result Value Ref Range    SAMPLES BEING HELD 1RED     COMMENT        Add-on orders for these samples will be processed based on acceptable specimen integrity and analyte stability, which may vary by analyte. Imaging:   Ct Perf W Cbf    Result Date: 12/2/2020  IMPRESSION: There is no major vessel occlusion. No flow limiting stenosis or intracranial aneurysm. No acute intracranial process. Mild cervical atherosclerotic vasculopathy. Severe canal and foraminal stenoses of the cervical spine.  This may be the etiology for right arm numbness/weakness. Nonemergent MRI of the cervical spine for full delineation. Right parotid lesion recommend nonemergent ultrasonographic interrogation. 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 ENT consultation as well. Thyroid goiter. Cta Code Neuro Head And Neck W Cont    Result Date: 12/2/2020  IMPRESSION: There is no major vessel occlusion. No flow limiting stenosis or intracranial aneurysm. No acute intracranial process. Mild cervical atherosclerotic vasculopathy. Severe canal and foraminal stenoses of the cervical spine. This may be the etiology for right arm numbness/weakness. Nonemergent MRI of the cervical spine for full delineation. Right parotid lesion recommend nonemergent ultrasonographic interrogation. 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 ENT consultation as well. Thyroid goiter. Ct Code Neuro Head Wo Contrast    Result Date: 12/2/2020  Impression: 1. No acute intracranial abnormality     Assessment/Plan      Right facial numbness: Concern that this is secondary to recent parotid biopsy and possible parotid infection. Patient may also have cervical radiculopathy causing her symptoms in the arm. We will get an MRI of the brain and MRI of the C-spine.   Aspirin, statin, neurovascular checks, neurology consult, PT OT speech consult, telemetry, troponin, supportive care, close monitoring, reassess as needed    Right facial swelling: Concern for parotid infection, start patient on IV antibiotics, CT maxillofacial, ENT consult in the morning, supportive care and close monitoring, reassess as needed, obtain blood cultures, continue to monitor      GI DVT prophylaxis: Patient will be on  heparin             Signed By: Maria Fernanda Singleton MD     December 2, 2020

## 2020-12-02 NOTE — ED PROVIDER NOTES
Please note that this dictation was completed with [employer], the computer voice recognition software.  Quite often unanticipated grammatical, syntax, homophones, and other interpretive errors are inadvertently transcribed by the computer software.  Please disregard these errors.  Please excuse any errors that have escaped final proofreading. 60-year-old female past medical history markable for anxiety, dermatitis, fibrocystic breast, hemorrhoids, kidney stones, and previous episodes of vertigo presents ER complaining of \" had a biopsy done of my parotid gland on the right side of my face on Monday. And last night about 6 PM noticed some right-sided facial numbness tingling into the right side of my head right neck right shoulder right arm to my elbow. It may have gone occasionally into my legs as well I am not sure. Been diagnosed with a TIA before was concerned might be having a stroke so came to the ER for further evaluation. Patient states she last took a muscle relaxer last evening is not currently on any blood thinners denies recent fevers chills other traumas dizziness vision changes difficulty swallowing. Patient states her arm feels numb, also face but 'can feel it still'; normal bladder bowell habits;     pt denies HA, vison changes, diff swallowing, CP, SOB, Abd pain, F/Ch, N/V, D/Cons or other current systemic complaints    Social/ PSH reviewed in EMR    EMR Chart Reviewed           Past Medical History:   Diagnosis Date    Anxiety     Dermatitis     atypical    Fibrocystic breast     Hemorrhoid     History of kidney stones     Last times was circa .    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 Family History:   Problem Relation Age of Onset    Arthritis-osteo Mother     Cancer Father         lung -heavy smoker    Cancer Maternal Aunt         breast    Breast Cancer Maternal Aunt     Breast Cancer Maternal Aunt        Social History     Socioeconomic History    Marital status:      Spouse name: Not on file    Number of children: Not on file    Years of education: Not on file    Highest education level: Not on file   Occupational History    Not on file   Social Needs    Financial resource strain: Not on file    Food insecurity     Worry: Not on file     Inability: Not on file   [employer] needs     Medical: Not on file     Non-medical: Not on file   Tobacco Use    Smoking status: Current Every Day Smoker     Packs/day: 0.50     Years: 35.00     Pack years: 17.50     Types: Cigarettes    Smokeless tobacco: Never Used   Substance and Sexual Activity    Alcohol use: Yes     Alcohol/week: 3.3 standard drinks     Types: 4 Glasses of wine per week    Drug use: No    Sexual activity: Yes     Partners: Male   Lifestyle    Physical activity     Days per week: Not on file     Minutes per session: Not on file    Stress: Not on file   Relationships    Social connections     Talks on phone: Not on file     Gets together: Not on file     Attends Voodoo service: Not on file     Active member of club or organization: Not on file     Attends meetings of clubs or organizations: Not on file     Relationship status: Not on file    Intimate partner violence     Fear of current or ex partner: Not on file     Emotionally abused: Not on file     Physically abused: Not on file     Forced sexual activity: Not on file   Other Topics Concern    Not on file   Social History Narrative    Not on file         ALLERGIES: Sulfa (sulfonamide antibiotics); Clindamycin; Keflex [cephalexin];  Morphine; and Pcn [penicillins]    Review of Systems   Constitutional: Negative for appetite change, chills and fever. HENT: Negative for drooling, trouble swallowing and voice change. Eyes: Negative for photophobia. Respiratory: Negative for cough, chest tightness and shortness of breath. Cardiovascular: Negative for chest pain and palpitations. Gastrointestinal: Negative for abdominal pain, diarrhea, nausea and vomiting. Genitourinary: Negative for dysuria. Musculoskeletal: Negative for back pain. Skin: Negative for rash. Neurological: Positive for numbness. Negative for facial asymmetry, speech difficulty and headaches. All other systems reviewed and are negative. Vitals:    12/02/20 2210 12/03/20 0200 12/03/20 0615 12/03/20 1000   BP: (!) 124/93 (!) 112/92 (!) 149/87 (!) 157/74   Pulse: 78 66 77 76   Resp: 22 14 14 19   Temp: 98 °F (36.7 °C) 98.2 °F (36.8 °C) 97.8 °F (36.6 °C) 98.5 °F (36.9 °C)   SpO2: 96% 95% 96% 99%   Weight:  86.2 kg (190 lb 0.6 oz)              Physical Exam  Vitals signs and nursing note reviewed. Constitutional:       General: She is not in acute distress. Appearance: Normal appearance. She is well-developed. She is obese. She is not ill-appearing, toxic-appearing or diaphoretic. Comments: Tearful, NAD, AxOx4, speaking in complete sentences     HENT:      Head: Normocephalic and atraumatic. Comments: Cn intact     No facial droop/ slurred speech/ tongue deviation    Noted min R yon angle swelling/ min ttp;      Right Ear: External ear normal.      Left Ear: External ear normal.   Eyes:      General: No scleral icterus. Right eye: No discharge. Conjunctiva/sclera: Conjunctivae normal.      Pupils: Pupils are equal, round, and reactive to light. Neck:      Musculoskeletal: Normal range of motion and neck supple. Vascular: No JVD. Trachea: No tracheal deviation. Cardiovascular:      Rate and Rhythm: Normal rate and regular rhythm. Heart sounds: Normal heart sounds. No murmur. No friction rub.  No gallop. Pulmonary:      Effort: Pulmonary effort is normal. No respiratory distress. Breath sounds: Normal breath sounds. No wheezing or rales. Chest:      Chest wall: No tenderness. Abdominal:      General: Bowel sounds are normal.      Palpations: Abdomen is soft. Tenderness: There is no abdominal tenderness. There is no guarding or rebound. Genitourinary:     Vagina: No vaginal discharge. Musculoskeletal: Normal range of motion. General: No tenderness. Skin:     General: Skin is warm and dry. Capillary Refill: Capillary refill takes less than 2 seconds. Coloration: Skin is not pale. Findings: No erythema or rash. Neurological:      General: No focal deficit present. Mental Status: She is alert and oriented to person, place, and time. Cranial Nerves: No cranial nerve deficit. Sensory: No sensory deficit. Motor: No weakness or abnormal muscle tone. Coordination: Coordination normal.      Gait: Gait normal.      Deep Tendon Reflexes: Reflexes normal.      Comments: pt has motor/ CV/ Sensation grossly intact to all extremities, R = L in strength;   Psychiatric:         Behavior: Behavior normal.         Thought Content: Thought content normal.          MDM       Procedures    No chief complaint on file. 2:32 PM  The patients presenting problems have been discussed, and they are in agreement with the care plan formulated and outlined with them. I have encouraged them to ask questions as they arise throughout their visit. MEDICATIONS GIVEN:  Medications - No data to display    LABS REVIEWED:  Labs Reviewed - No data to display    RADIOLOGY RESULTS:  The following have been ordered and reviewed:  _____________________________________________________________________  _____________________________________________________________________    EKG interpretation:   Rhythm: normal sinus rhythm; and regular .  Rate (approx.): 81; Axis: normal; P wave: normal; QRS interval: normal ; ST/T wave: normal; Negative acute significant segmental elevations/ compared to study dated 11/11/2020    PROCEDURES:        CONSULTATIONS:       PROGRESS NOTES:      DIAGNOSIS:    1. Facial numbness    2. Right arm numbness        PLAN:  1-admit/ monitor/ obtain MRI      ED COURSE: The patients hospital course has been uncomplicated. CONSULT  NOTE  2:46 PM  Jennifer Alberts MD spoke with   Specialty: Teleneurology  Discussed pt's hx, disposition, and available diagnostic and imaging results. Reviewed care plans. Consulting physician agrees with plans as outlined 'I will see the Pt'; Darylene Berkshire Serve Consult for Admission  3:40 PM    ED Room Number: ER12/12  Patient Name and age:  Jerzy Quiroz 63 y.o.  female  Working Diagnosis:   1. Facial numbness    2.  Right arm numbness        COVID-19 Suspicion:  no  Sepsis present:  no  Reassessment needed: yes  Code Status:  Full Code  Readmission: no  Isolation Requirements:  no  Recommended Level of Care:  telemetry  Department:[facility] Adult ED - 21   Other:  Code S level 2;

## 2020-12-03 ENCOUNTER — APPOINTMENT (OUTPATIENT)
Dept: NON INVASIVE DIAGNOSTICS | Age: 65
End: 2020-12-03
Attending: FAMILY MEDICINE
Payer: COMMERCIAL

## 2020-12-03 ENCOUNTER — APPOINTMENT (OUTPATIENT)
Dept: MRI IMAGING | Age: 65
End: 2020-12-03
Attending: FAMILY MEDICINE
Payer: COMMERCIAL

## 2020-12-03 VITALS
TEMPERATURE: 98.4 F | RESPIRATION RATE: 16 BRPM | BODY MASS INDEX: 29.82 KG/M2 | HEIGHT: 67 IN | DIASTOLIC BLOOD PRESSURE: 85 MMHG | WEIGHT: 190 LBS | SYSTOLIC BLOOD PRESSURE: 135 MMHG | HEART RATE: 76 BPM | OXYGEN SATURATION: 96 %

## 2020-12-03 LAB
ANION GAP SERPL CALC-SCNC: 7 MMOL/L (ref 5–15)
ATRIAL RATE: 81 BPM
BUN SERPL-MCNC: 11 MG/DL (ref 6–20)
BUN/CREAT SERPL: 17 (ref 12–20)
CALCIUM SERPL-MCNC: 8.5 MG/DL (ref 8.5–10.1)
CALCULATED P AXIS, ECG09: 60 DEGREES
CALCULATED R AXIS, ECG10: 35 DEGREES
CALCULATED T AXIS, ECG11: 50 DEGREES
CHLORIDE SERPL-SCNC: 104 MMOL/L (ref 97–108)
CHOLEST SERPL-MCNC: 252 MG/DL
CK SERPL-CCNC: 400 U/L (ref 26–192)
CK SERPL-CCNC: 423 U/L (ref 26–192)
CO2 SERPL-SCNC: 24 MMOL/L (ref 21–32)
CREAT SERPL-MCNC: 0.63 MG/DL (ref 0.55–1.02)
CRP SERPL HS-MCNC: 5.1 MG/L
DIAGNOSIS, 93000: NORMAL
ECHO AO ROOT DIAM: 2.98 CM
ECHO AV AREA PEAK VELOCITY: 1.63 CM2
ECHO AV AREA/BSA PEAK VELOCITY: 0.8 CM2/M2
ECHO AV PEAK GRADIENT: 15.25 MMHG
ECHO AV PEAK VELOCITY: 195.26 CM/S
ECHO EST RA PRESSURE: 3 MMHG
ECHO LA AREA 4C: 20.04 CM2
ECHO LA MAJOR AXIS: 4.37 CM
ECHO LA MINOR AXIS: 2.21 CM
ECHO LA VOL 2C: 66.33 ML (ref 22–52)
ECHO LA VOL 4C: 53.68 ML (ref 22–52)
ECHO LA VOL BP: 64.16 ML (ref 22–52)
ECHO LA VOL/BSA BIPLANE: 32.43 ML/M2 (ref 16–28)
ECHO LA VOLUME INDEX A2C: 33.53 ML/M2 (ref 16–28)
ECHO LA VOLUME INDEX A4C: 27.13 ML/M2 (ref 16–28)
ECHO LV INTERNAL DIMENSION DIASTOLIC: 5.54 CM (ref 3.9–5.3)
ECHO LV INTERNAL DIMENSION SYSTOLIC: 3.3 CM
ECHO LV IVSD: 0.91 CM (ref 0.6–0.9)
ECHO LV MASS 2D: 194.9 G (ref 67–162)
ECHO LV MASS INDEX 2D: 98.5 G/M2 (ref 43–95)
ECHO LV POSTERIOR WALL DIASTOLIC: 0.94 CM (ref 0.6–0.9)
ECHO LVOT DIAM: 1.79 CM
ECHO LVOT PEAK GRADIENT: 6.36 MMHG
ECHO LVOT PEAK VELOCITY: 126.06 CM/S
ECHO MV A VELOCITY: 81.39 CM/S
ECHO MV AREA PHT: 3.29 CM2
ECHO MV E DECELERATION TIME (DT): 230.9 MS
ECHO MV E VELOCITY: 72.05 CM/S
ECHO MV E/A RATIO: 0.89
ECHO MV PRESSURE HALF TIME (PHT): 66.96 MS
ECHO PV PEAK INSTANTANEOUS GRADIENT SYSTOLIC: 1.94 MMHG
ECHO RIGHT VENTRICULAR SYSTOLIC PRESSURE (RVSP): 31.98 MMHG
ECHO RV INTERNAL DIMENSION: 3.11 CM
ECHO RV TAPSE: 2.02 CM (ref 1.5–2)
ECHO TV REGURGITANT MAX VELOCITY: 269.15 CM/S
ECHO TV REGURGITANT PEAK GRADIENT: 28.98 MMHG
ERYTHROCYTE [DISTWIDTH] IN BLOOD BY AUTOMATED COUNT: 11.9 % (ref 11.5–14.5)
ERYTHROCYTE [SEDIMENTATION RATE] IN BLOOD: 67 MM/HR (ref 0–30)
EST. AVERAGE GLUCOSE BLD GHB EST-MCNC: 126 MG/DL
GLUCOSE SERPL-MCNC: 130 MG/DL (ref 65–100)
HBA1C MFR BLD: 6 % (ref 4–5.6)
HCT VFR BLD AUTO: 39.7 % (ref 35–47)
HDLC SERPL-MCNC: 64 MG/DL
HDLC SERPL: 3.9 {RATIO} (ref 0–5)
HGB BLD-MCNC: 13.4 G/DL (ref 11.5–16)
LDLC SERPL CALC-MCNC: 138 MG/DL (ref 0–100)
LIPID PROFILE,FLP: ABNORMAL
MCH RBC QN AUTO: 32.1 PG (ref 26–34)
MCHC RBC AUTO-ENTMCNC: 33.8 G/DL (ref 30–36.5)
MCV RBC AUTO: 95 FL (ref 80–99)
NRBC # BLD: 0 K/UL (ref 0–0.01)
NRBC BLD-RTO: 0 PER 100 WBC
P-R INTERVAL, ECG05: 144 MS
PLATELET # BLD AUTO: 264 K/UL (ref 150–400)
PMV BLD AUTO: 9.7 FL (ref 8.9–12.9)
POTASSIUM SERPL-SCNC: 3.3 MMOL/L (ref 3.5–5.1)
Q-T INTERVAL, ECG07: 394 MS
QRS DURATION, ECG06: 96 MS
QTC CALCULATION (BEZET), ECG08: 457 MS
RBC # BLD AUTO: 4.18 M/UL (ref 3.8–5.2)
SODIUM SERPL-SCNC: 135 MMOL/L (ref 136–145)
TRIGL SERPL-MCNC: 250 MG/DL (ref ?–150)
TROPONIN I SERPL-MCNC: <0.05 NG/ML
TSH SERPL DL<=0.05 MIU/L-ACNC: 7.3 UIU/ML (ref 0.36–3.74)
VENTRICULAR RATE, ECG03: 81 BPM
VLDLC SERPL CALC-MCNC: 50 MG/DL
WBC # BLD AUTO: 6.6 K/UL (ref 3.6–11)

## 2020-12-03 PROCEDURE — 80061 LIPID PANEL: CPT

## 2020-12-03 PROCEDURE — 74011250637 HC RX REV CODE- 250/637: Performed by: FAMILY MEDICINE

## 2020-12-03 PROCEDURE — 86141 C-REACTIVE PROTEIN HS: CPT

## 2020-12-03 PROCEDURE — 70551 MRI BRAIN STEM W/O DYE: CPT

## 2020-12-03 PROCEDURE — 74011250637 HC RX REV CODE- 250/637: Performed by: NURSE PRACTITIONER

## 2020-12-03 PROCEDURE — 97161 PT EVAL LOW COMPLEX 20 MIN: CPT

## 2020-12-03 PROCEDURE — 93306 TTE W/DOPPLER COMPLETE: CPT

## 2020-12-03 PROCEDURE — 96372 THER/PROPH/DIAG INJ SC/IM: CPT

## 2020-12-03 PROCEDURE — 85652 RBC SED RATE AUTOMATED: CPT

## 2020-12-03 PROCEDURE — 84443 ASSAY THYROID STIM HORMONE: CPT

## 2020-12-03 PROCEDURE — 83036 HEMOGLOBIN GLYCOSYLATED A1C: CPT

## 2020-12-03 PROCEDURE — 97165 OT EVAL LOW COMPLEX 30 MIN: CPT

## 2020-12-03 PROCEDURE — 74011250636 HC RX REV CODE- 250/636: Performed by: FAMILY MEDICINE

## 2020-12-03 PROCEDURE — 74011000258 HC RX REV CODE- 258: Performed by: FAMILY MEDICINE

## 2020-12-03 PROCEDURE — 82550 ASSAY OF CK (CPK): CPT

## 2020-12-03 PROCEDURE — 36415 COLL VENOUS BLD VENIPUNCTURE: CPT

## 2020-12-03 PROCEDURE — 72141 MRI NECK SPINE W/O DYE: CPT

## 2020-12-03 PROCEDURE — 96376 TX/PRO/DX INJ SAME DRUG ADON: CPT

## 2020-12-03 PROCEDURE — 85027 COMPLETE CBC AUTOMATED: CPT

## 2020-12-03 PROCEDURE — 99218 HC RM OBSERVATION: CPT

## 2020-12-03 PROCEDURE — 80048 BASIC METABOLIC PNL TOTAL CA: CPT

## 2020-12-03 PROCEDURE — 99214 OFFICE O/P EST MOD 30 MIN: CPT | Performed by: NURSE PRACTITIONER

## 2020-12-03 PROCEDURE — 74011250637 HC RX REV CODE- 250/637: Performed by: INTERNAL MEDICINE

## 2020-12-03 PROCEDURE — 97535 SELF CARE MNGMENT TRAINING: CPT

## 2020-12-03 RX ORDER — LORAZEPAM 1 MG/1
1 TABLET ORAL
Status: DISCONTINUED | OUTPATIENT
Start: 2020-12-03 | End: 2020-12-03 | Stop reason: HOSPADM

## 2020-12-03 RX ORDER — HYDROXYZINE 25 MG/1
25 TABLET, FILM COATED ORAL
Qty: 30 TAB | Refills: 0 | Status: SHIPPED | OUTPATIENT
Start: 2020-12-03 | End: 2020-12-08

## 2020-12-03 RX ORDER — HYDROXYZINE 25 MG/1
25 TABLET, FILM COATED ORAL ONCE
Status: COMPLETED | OUTPATIENT
Start: 2020-12-03 | End: 2020-12-03

## 2020-12-03 RX ADMIN — THERA TABS 1 TABLET: TAB at 09:07

## 2020-12-03 RX ADMIN — FLUTICASONE PROPIONATE 2 SPRAY: 50 SPRAY, METERED NASAL at 09:08

## 2020-12-03 RX ADMIN — HEPARIN SODIUM 5000 UNITS: 5000 INJECTION INTRAVENOUS; SUBCUTANEOUS at 04:48

## 2020-12-03 RX ADMIN — METRONIDAZOLE 500 MG: 500 INJECTION, SOLUTION INTRAVENOUS at 17:17

## 2020-12-03 RX ADMIN — HEPARIN SODIUM 5000 UNITS: 5000 INJECTION INTRAVENOUS; SUBCUTANEOUS at 11:11

## 2020-12-03 RX ADMIN — FAMOTIDINE 20 MG: 20 TABLET, FILM COATED ORAL at 09:07

## 2020-12-03 RX ADMIN — ASPIRIN 81 MG: 81 TABLET, CHEWABLE ORAL at 09:08

## 2020-12-03 RX ADMIN — METRONIDAZOLE 500 MG: 500 INJECTION, SOLUTION INTRAVENOUS at 04:48

## 2020-12-03 RX ADMIN — LORAZEPAM 1 MG: 1 TABLET ORAL at 02:10

## 2020-12-03 RX ADMIN — AZTREONAM 2 G: 2 INJECTION, POWDER, LYOPHILIZED, FOR SOLUTION INTRAMUSCULAR; INTRAVENOUS at 11:08

## 2020-12-03 RX ADMIN — HYDROXYZINE HYDROCHLORIDE 25 MG: 25 TABLET, FILM COATED ORAL at 18:31

## 2020-12-03 RX ADMIN — AZTREONAM 2 G: 2 INJECTION, POWDER, LYOPHILIZED, FOR SOLUTION INTRAMUSCULAR; INTRAVENOUS at 02:10

## 2020-12-03 NOTE — CONSULTS
Neurology  Consult  Franklin Gallardo FNP-C  Neurology NP  [phone]    Patient: Abelardo Whatley MRN: [id number]  SSN: [id number]    YOB: 1955  Age: 63 y.o. Sex: female        Subjective:      Abelardo Whatley is a 63 y.o. female with a medical hx of anxiety, previous episodes of vertigo, headache. She presents to the ED with right facial numbness. On Monday she had had a biopsy. Patient reports the area that was biopsied feels hot and tender and she is concerned that she may be developing an infection. She developed tingling on the right side on Tuesday evening. The numbness had radiated down her arm but mostly localized to her neck region. She had taken one of her muscle relaxants that was recently prescribed to her by Dr. Sanjuana Osei. She did not feel like it helped much. On Wednesday morning while in court she notes that she did not notice much of the tingling sensation. Later on, that day however she then again developed this numbness sensation in her face and down into her arm. During today's evaluation patient became very tearful with no true reason. She did note that she has been under an significant amount of stress related to her  being sick. She also is a  for several apartment complex and that also has become very stressful. Several times she would stop and cry. She then would state \"I do not know why I am crying\". She does have a history of his anxiety was previously prescribed lorazepam as needed but reports that she has not had any to use any for some time now. Past Medical History:   Diagnosis Date    Anxiety     Dermatitis     atypical    Fibrocystic breast     Hemorrhoid     History of kidney stones     Last times was circa .    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 Family History   Problem Relation Age of Onset    Arthritis-osteo Mother     Cancer Father         lung -heavy smoker    Cancer Maternal Aunt         breast    Breast Cancer Maternal Aunt     Breast Cancer Maternal Aunt      Social History     Tobacco Use    Smoking status: Current Every Day Smoker     Packs/day: 0.50     Years: 35.00     Pack years: 17.50     Types: Cigarettes    Smokeless tobacco: Never Used   Substance Use Topics    Alcohol use:  Yes     Alcohol/week: 3.3 standard drinks     Types: 4 Glasses of wine per week      Current Facility-Administered Medications   Medication Dose Route Frequency Provider Last Rate Last Dose    LORazepam (ATIVAN) tablet 1 mg  1 mg Oral QHS PRN Clau Conway NP   1 mg at 12/03/20 0210    fluticasone propionate (FLONASE) 50 mcg/actuation nasal spray 2 Spray  2 Spray Both Nostrils DAILY Adan Lee MD   2 Spray at 12/03/20 0908    therapeutic multivitamin (THERAGRAN) tablet 1 Tab  1 Tab Oral DAILY Adan Lee MD   1 Tab at 12/03/20 0907    acetaminophen (TYLENOL) tablet 650 mg  650 mg Oral Q4H PRN Adan Lee MD        Or   Irina Yen acetaminophen (TYLENOL) solution 650 mg  650 mg Per NG tube Q4H PRN Adan Lee MD        Or   Irina Yen acetaminophen (TYLENOL) suppository 650 mg  650 mg Rectal Q4H PRN Adan Lee MD        0.9% sodium chloride infusion  75 mL/hr IntraVENous CONTINUOUS Adan Lee MD 75 mL/hr at 12/02/20 1919 75 mL/hr at 12/02/20 1919    aspirin chewable tablet 81 mg  81 mg Oral DAILY Adan Lee MD   81 mg at 12/03/20 0908    atorvastatin (LIPITOR) tablet 80 mg  80 mg Oral QHS Adan Lee MD   80 mg at 12/02/20 2143    famotidine (PEPCID) tablet 20 mg  20 mg Oral Q12H Adan Lee MD   20 mg at 12/03/20 0907    heparin (porcine) injection 5,000 Units  5,000 Units SubCUTAneous Q8H Nanci Gurrloa MD   5,000 Units at 12/03/20 1111    metroNIDAZOLE (FLAGYL) IVPB premix 500 mg  500 mg IntraVENous Q12H Nanci Gurrola  mL/hr at 12/03/20 0448 500 mg at 12/03/20 0448    aztreonam (AZACTAM) 2 g in 0.9% sodium chloride (MBP/ADV) 100 mL MBP  2 g IntraVENous Q8H Nanci Gurrola  mL/hr at 12/03/20 1108 2 g at 12/03/20 1108        Allergies   Allergen Reactions    Sulfa (Sulfonamide Antibiotics) Hives     1 week after starting Bactrim - Feb 2016    Clindamycin Rash    Keflex [Cephalexin] Rash    Morphine Other (comments)     Dysphoria/anxiety    Pcn [Penicillins] Rash       Review of Systems:  Pertinent items are noted in the History of Present Illness. Objective:     Vitals:    12/03/20 0200 12/03/20 0615 12/03/20 1000 12/03/20 1040   BP: (!) 112/92 (!) 149/87 (!) 157/74 (!) 157/74   Pulse: 66 77 76    Resp: 14 14 19    Temp: 98.2 °F (36.8 °C) 97.8 °F (36.6 °C) 98.5 °F (36.9 °C)    SpO2: 95% 96% 99%    Weight: 190 lb 0.6 oz (86.2 kg)   190 lb (86.2 kg)   Height:    5' 7\" (1.702 m)        Physical Exam:  GENERAL: alert, cooperative, no distress, appears stated age      Neurologic Exam:  Mental Status:  Alert and oriented x 4. Appropriate affect, mood and behavior. Language:    Normal fluency, repetition, comprehension and naming. Cranial Nerves:   Normal appearing fundi, sharp discs. Pupils equal, round and reactive to light. Visual fields full to confrontation. Extraocular movements intact. Facial sensation intact. Full facial strength, no asymmetry. Hearing intact bilaterally. No dysarthria. Tongue protrudes to midline, palate elevates symmetrically. Shoulder shrug 5/5 bilaterally. Motor:    No pronator drift. Bulk and tone normal.      5/5 power in all extremities proximally and distally. No involuntary movements.     Sensation:    Sensation intact throughout to light touch, temperature,     pinprick, vibration, proprioception; decreased pinprick in length    dependent fashion    Reflexes:    Reflexes are 2+ at the biceps, triceps, patella and achilles     bilaterally. Negative Babinskis    Coordination & Gait: Normal.    Recent Results (from the past 24 hour(s))   GLUCOSE, POC    Collection Time: 12/02/20  2:32 PM   Result Value Ref Range    Glucose (POC) 156 (H) 65 - 100 mg/dL    Performed by Pollie Ion    CBC WITH AUTOMATED DIFF    Collection Time: 12/02/20  2:37 PM   Result Value Ref Range    WBC 6.6 3.6 - 11.0 K/uL    RBC 4.47 3.80 - 5.20 M/uL    HGB 14.5 11.5 - 16.0 g/dL    HCT 41.9 35.0 - 47.0 %    MCV 93.7 80.0 - 99.0 FL    MCH 32.4 26.0 - 34.0 PG    MCHC 34.6 30.0 - 36.5 g/dL    RDW 11.9 11.5 - 14.5 %    PLATELET 252 016 - 163 K/uL    MPV 9.2 8.9 - 12.9 FL    NRBC 0.0 0  WBC    ABSOLUTE NRBC 0.00 0.00 - 0.01 K/uL    NEUTROPHILS 58 32 - 75 %    LYMPHOCYTES 33 12 - 49 %    MONOCYTES 6 5 - 13 %    EOSINOPHILS 2 0 - 7 %    BASOPHILS 1 0 - 1 %    IMMATURE GRANULOCYTES 0 0.0 - 0.5 %    ABS. NEUTROPHILS 3.8 1.8 - 8.0 K/UL    ABS. LYMPHOCYTES 2.2 0.8 - 3.5 K/UL    ABS. MONOCYTES 0.4 0.0 - 1.0 K/UL    ABS. EOSINOPHILS 0.2 0.0 - 0.4 K/UL    ABS. BASOPHILS 0.1 0.0 - 0.1 K/UL    ABS. IMM. GRANS. 0.0 0.00 - 0.04 K/UL    DF AUTOMATED     METABOLIC PANEL, COMPREHENSIVE    Collection Time: 12/02/20  2:37 PM   Result Value Ref Range    Sodium 138 136 - 145 mmol/L    Potassium 3.4 (L) 3.5 - 5.1 mmol/L    Chloride 104 97 - 108 mmol/L    CO2 26 21 - 32 mmol/L    Anion gap 8 5 - 15 mmol/L    Glucose 151 (H) 65 - 100 mg/dL    BUN 12 6 - 20 MG/DL    Creatinine 0.74 0.55 - 1.02 MG/DL    BUN/Creatinine ratio 16 12 - 20      GFR est AA >60 >60 ml/min/1.73m2    GFR est non-AA >60 >60 ml/min/1.73m2    Calcium 9.3 8.5 - 10.1 MG/DL    Bilirubin, total 0.3 0.2 - 1.0 MG/DL    ALT (SGPT) 48 12 - 78 U/L    AST (SGOT) 28 15 - 37 U/L    Alk.  phosphatase 94 45 - 117 U/L    Protein, total 7.5 6.4 - 8.2 g/dL Albumin 3.8 3.5 - 5.0 g/dL    Globulin 3.7 2.0 - 4.0 g/dL    A-G Ratio 1.0 (L) 1.1 - 2.2     PROTHROMBIN TIME + INR    Collection Time: 12/02/20  2:37 PM   Result Value Ref Range    INR 1.0 0.9 - 1.1      Prothrombin time 10.1 9.0 - 11.1 sec   TROPONIN I    Collection Time: 12/02/20  2:37 PM   Result Value Ref Range    Troponin-I, Qt. <0.05 <0.05 ng/mL   SAMPLES BEING HELD    Collection Time: 12/02/20  2:37 PM   Result Value Ref Range    SAMPLES BEING HELD 1RED     COMMENT        Add-on orders for these samples will be processed based on acceptable specimen integrity and analyte stability, which may vary by analyte.    EKG, 12 LEAD, INITIAL    Collection Time: 12/02/20  3:50 PM   Result Value Ref Range    Ventricular Rate 81 BPM    Atrial Rate 81 BPM    P-R Interval 144 ms    QRS Duration 96 ms    Q-T Interval 394 ms    QTC Calculation (Bezet) 457 ms    Calculated P Axis 60 degrees    Calculated R Axis 35 degrees    Calculated T Axis 50 degrees    Diagnosis       Normal sinus rhythm  When compared with ECG of 11-NOV-2020 11:26,  No significant change was found  Confirmed by Barbie Freed ([id number]) on 12/3/2020 12:38:17 PM     CULTURE, BLOOD, PAIRED    Collection Time: 12/02/20  6:34 PM    Specimen: Blood   Result Value Ref Range    Special Requests: NO SPECIAL REQUESTS      Culture result: NO GROWTH AFTER 10 HOURS     CK    Collection Time: 12/02/20 10:15 PM   Result Value Ref Range     (H) 26 - 192 U/L   TROPONIN I    Collection Time: 12/02/20 10:15 PM   Result Value Ref Range    Troponin-I, Qt. <0.05 <0.05 ng/mL   LIPID PANEL    Collection Time: 12/03/20  2:30 AM   Result Value Ref Range    LIPID PROFILE          Cholesterol, total 252 (H) <200 MG/DL    Triglyceride 250 (H) <150 MG/DL    HDL Cholesterol 64 MG/DL    LDL, calculated 138 (H) 0 - 100 MG/DL    VLDL, calculated 50 MG/DL    CHOL/HDL Ratio 3.9 0.0 - 5.0     HEMOGLOBIN A1C WITH EAG    Collection Time: 12/03/20  2:30 AM   Result Value Ref Range Hemoglobin A1c 6.0 (H) 4.0 - 5.6 %    Est. average glucose 126 mg/dL   CBC W/O DIFF    Collection Time: 12/03/20  2:30 AM   Result Value Ref Range    WBC 6.6 3.6 - 11.0 K/uL    RBC 4.18 3.80 - 5.20 M/uL    HGB 13.4 11.5 - 16.0 g/dL    HCT 39.7 35.0 - 47.0 %    MCV 95.0 80.0 - 99.0 FL    MCH 32.1 26.0 - 34.0 PG    MCHC 33.8 30.0 - 36.5 g/dL    RDW 11.9 11.5 - 14.5 %    PLATELET 472 521 - 414 K/uL    MPV 9.7 8.9 - 12.9 FL    NRBC 0.0 0  WBC    ABSOLUTE NRBC 0.00 0.00 - 0.01 K/uL   SED RATE (ESR)    Collection Time: 12/03/20  2:30 AM   Result Value Ref Range    Sed rate, automated 67 (H) 0 - 30 mm/hr   CRP, HIGH SENSITIVITY    Collection Time: 12/03/20  2:30 AM   Result Value Ref Range    CRP, High sensitivity 5.1 mg/L   METABOLIC PANEL, BASIC    Collection Time: 12/03/20  2:30 AM   Result Value Ref Range    Sodium 135 (L) 136 - 145 mmol/L    Potassium 3.3 (L) 3.5 - 5.1 mmol/L    Chloride 104 97 - 108 mmol/L    CO2 24 21 - 32 mmol/L    Anion gap 7 5 - 15 mmol/L    Glucose 130 (H) 65 - 100 mg/dL    BUN 11 6 - 20 MG/DL    Creatinine 0.63 0.55 - 1.02 MG/DL    BUN/Creatinine ratio 17 12 - 20      GFR est AA >60 >60 ml/min/1.73m2    GFR est non-AA >60 >60 ml/min/1.73m2    Calcium 8.5 8.5 - 10.1 MG/DL   TSH 3RD GENERATION    Collection Time: 12/03/20  2:33 AM   Result Value Ref Range    TSH 7.30 (H) 0.36 - 3.74 uIU/mL   CK    Collection Time: 12/03/20  2:33 AM   Result Value Ref Range     (H) 26 - 192 U/L   ECHO ADULT COMPLETE    Collection Time: 12/03/20 11:05 AM   Result Value Ref Range    IVSd 0.91 (A) 0.6 - 0.9 cm    LVIDd 5.54 (A) 3.9 - 5.3 cm    LVIDs 3.30 cm    LVOT d 1.79 cm    LVPWd 0.94 (A) 0.6 - 0.9 cm    LVOT Peak Gradient 6.36 mmHg    LVOT Peak Velocity 126.06 cm/s    RVIDd 3.11 cm    RVSP 31.98 mmHg    Left Atrium Major Axis 4.37 cm    LA Volume 64.16 22 - 52 mL    LA Area 4C 20.04 cm2    LA Vol 2C 66.33 (A) 22 - 52 mL    LA Vol 4C 53.68 (A) 22 - 52 mL    Est. RA Pressure 3.00 mmHg    Aortic Valve Area by Continuity of Peak Velocity 1.63 cm2    AoV PG 15.25 mmHg    Aortic Valve Systolic Peak Velocity 300.84 cm/s    MV A Néstor 81.39 cm/s    Mitral Valve E Wave Deceleration Time 230.90 ms    MV E Néstor 72.05 cm/s    Mitral Valve Pressure Half-time 66.96 ms    MVA (PHT) 3.29 cm2    Pulmonic Valve Systolic Peak Instantaneous Gradient 1.94 mmHg    Tapse 2.02 (A) 1.5 - 2.0 cm    Triscuspid Valve Regurgitation Peak Gradient 28.98 mmHg    TR Max Velocity 269.15 cm/s    Ao Root D 2.98 cm    MV E/A 0.89     LV Mass .9 67 - 162 g    LV Mass AL Index 98.5 43 - 95 g/m2    Left Atrium Minor Axis 2.21 cm    LA Vol Index 32.43 16 - 28 ml/m2    LA Vol Index 33.53 16 - 28 ml/m2    LA Vol Index 27.13 16 - 28 ml/m2    SHANNAN/BSA Pk Néstor 0.8 cm2/m2       Imaging:  MRI Results (most recent):  Results from Hospital Encounter encounter on 12/02/20   MRI CERV SPINE WO CONT    Narrative   *PRELIMINARY REPORT*    MRI examination of the spine demonstrates mild cord effacement at C4-5 and C5-6,  worse at C5-6, paracentral to the right, likely due to disc osteophyte  formation. Preliminary report was provided by Dr. Willy Chowdhury, the on-call radiologist, at  1:46 AM    Final report to follow. *END PRELIMINARY REPOR*      EXAM: MRI CERV SPINE WO CONT    INDICATION: CERVICAL RADICULOPATHY. COMPARISON: None    TECHNIQUE: MR imaging of the cervical spine was performed using the following  sequences: sagittal T1, T2, STIR;  axial T2, T1.     CONTRAST:  None. FINDINGS:    There is straightening of the cervical spine with minimal reversal of curvature  centered at C5. No significant listhesis. Vertebral body heights are maintained. Marrow signal is normal.    The craniocervical junction is intact. The course, caliber, and signal intensity  of the spinal cord are normal.      The paraspinal soft tissues are within normal limits. C2-C3: There is mild left neural foraminal narrowing.  No significant spinal  stenosis or right neural foraminal narrowing. C3-C4: No herniation or stenosis. C4-C5: Mild disc space narrowing. Mild broad-based disc osteophyte complex  causing mild spinal stenosis. No significant neural foraminal narrowing. C5-C6: Mild disc space narrowing. Mild broad-based disc osteophyte complex,  worse centrally. There is mild spinal stenosis. There is mild left neural  foraminal narrowing. C6-C7: Moderate disc space narrowing. Mild right subarticular zone disc  osteophyte complex causing narrowing of the right lateral recess. There is mild  right and moderate left neural foraminal narrowing. C7-T1: No herniation or stenosis. Impression IMPRESSION:  Multilevel spondylosis as above, worst at C4-5 and C5-6. Assessment:     Hospital Problems  Date Reviewed: 11/12/2020          Codes Class Noted POA    Anxiety ICD-10-CM: F41.9  ICD-9-CM: 300.00  10/12/2011 Yes            Plan:   Christiano Sarmiento is a 63 y.o. female  who has who has been admitted to the hospital 2 times within the last month with similar neurologic issues. I do believe that these events may be exacerbated by stress and anxiety. She seems to be very overwhelmed with her  health as well as her job as a . MRI negative for acute process. Neurological exam unrevealing. We discussed trying medication to help cope. - Recommend outpatient counseling for strategies to manage stress  - Consider psych consult  -Would consider starting an antidepressant    Thank you very much for this consultation. No further neurologic recommendations at this time. Will sign off but please call with questions.         Signed By: Dariel Best NP     December 3, 2020

## 2020-12-03 NOTE — PROGRESS NOTES
Bedside shift change report given to Alyssa (oncoming nurse) by Ly Smalls (offgoing nurse). Report included the following information SBAR, Kardex, Intake/Output, MAR, Cardiac Rhythm NSR, Quality Measures and Dual Neuro Assessment.

## 2020-12-03 NOTE — PROGRESS NOTES
OCCUPATIONAL THERAPY EVALUATION/DISCHARGE  Patient: Ivon Hooker (52 y.o. female)  Date: 12/3/2020  Primary Diagnosis: CVA (cerebral vascular accident) [facility]) [I63.9]       Precautions: universal       ASSESSMENT  Based on the objective data described below, the patient presents with ability to complete ADL tasks with indep-mod indep with admission for R side head pressure with swelling to face and intermittent R arm and mouth numbness. Head CT and MRI negative for acute changes, showing severe canal stenosis and forminal stenosis of cervical spine and spondylosis of C4-5 and C5-6. Recent parotid gland biopsy earlier this week. Patient with no changes in vision. Fugl Yan 66/66 and indep-mod indep with ADL tasks. Recommend dc home with support from . Current Level of Function (ADLs/self-care): mod indep-indep    Functional Outcome Measure: The patient scored Total A-D  Total A-D (Motor Function): 66/66 on the Fugl-Yan Assessment which is indicative of no impairment in upper extremity functional status. Other factors to consider for discharge: none     PLAN :  Recommendation for discharge: (in order for the patient to meet his/her long term goals)  No skilled occupational therapy/ follow up rehabilitation needs identified at this time. 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     OBJECTIVE DATA SUMMARY:   HISTORY:   Past Medical History:   Diagnosis Date    Anxiety     Dermatitis     atypical    Fibrocystic breast     Hemorrhoid     History of kidney stones     Last times was circa .    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 Prior Level of Function/Environment/Context: Indep with ADL tasks, driving, working as   Expanded or extensive additional review of patient history:     Home Situation  Home Environment: Private residence  # Steps to Enter: 8  Rails to Enter: Yes  One/Two Story Residence: Two story(three floors- basement for laundry and cat litter)  Living Alone: No  Support Systems: Friends \ neighbors, Spouse/Significant Other/Partner  Current DME Used/Available at Home: None  Tub or Shower Type: Tub/Shower combination    Hand dominance: Right    EXAMINATION OF PERFORMANCE DEFICITS:  Cognitive/Behavioral Status:  Neurologic State: Alert; Appropriate for age  Orientation Level: Oriented X4  Cognition: Follows commands           Hearing: Auditory  Auditory Impairment: None    Vision/Perceptual:                           Acuity: Within Defined Limits(hx of R floater- annual follow up)         Range of Motion:  AROM: Within functional limits                         Strength:  Strength: Within functional limits                Coordination:  Coordination: Within functional limits  Fine Motor Skills-Upper: Left Intact; Right Intact    Gross Motor Skills-Upper: Left Intact; Right Intact    Tone & Sensation:    Tone: Normal  Sensation: Intact                      Balance:  Sitting: Intact  Standing: Intact    Functional Mobility and Transfers for ADLs:  Bed Mobility:  Rolling: Independent  Supine to Sit: Independent  Sit to Supine: Independent  Scooting: Independent    Transfers:  Sit to Stand: Independent  Stand to Sit: Independent  Bed to Chair: Independent    ADL Assessment:  Feeding: Independent    Oral Facial Hygiene/Grooming: Independent    Bathing: Modified independent    Upper Body Dressing: Independent    Lower Body Dressing: Modified independent    Toileting: Modified independent                ADL Intervention and task modifications:     Patient instructed and indicated understanding the benefits of maintaining activity tolerance, functional mobility, and independence with self care tasks during acute stay  to ensure safe return home and to baseline. Encouraged patient to increase frequency and duration OOB, be out of bed for all meals, perform daily ADLs (as approved by RN/MD regarding bathing etc), and performing functional mobility to/from bathroom. Lower Body Dressing Assistance  Socks: Modified independent  Slip on Shoes Without Back: Modified independent      Functional Measure:  Fugl-Yan Assessment of Motor Recovery after Stroke:   Reflex Activity  Flexors/Biceps/Fingers: Can be elicited  Extensors/Triceps: Can be elicited  Reflex Subtotal: 4    Volitional Movement Within Synergies  Shoulder Retraction: Full  Shoulder Elevation: Full  Shoulder Abduction (90 degrees): Full  Shoulder External Rotation: Full  Elbow Flexion: Full  Forearm Supination: Full  Shoulder Adduction/Internal Rotation: Full  Elbow Extension: Full  Forearm Pronation: Full  Subtotal: 18    Volitional Movement Mixing Synergies  Hand to Lumbar Spine: Full  Shoulder Flexion (0-90 degrees): Full  Pronation-Supination: Full  Subtotal: 6    Volitional Movement With Little or No Synergy  Shoulder Abduction (0-90 degrees): Full  Shoulder Flexion ( degrees): Full  Pronation/Supination: Full  Subtotal : 6    Normal Reflex Activity  Biceps, Triceps, Finger Flexors:  Full  Subtotal : 2    Upper Extremity Total   Upper Extremity Total: 36    Wrist  Stability at 15 Degree Dorsiflexion: Full  Repeated Dorsiflexion/ Volar Flexion: Full  Stability at 15 Degree Dorsiflexion: Full  Repeated Dorsiflexion/ Volar Flexion: Full  Circumduction: Full  Wrist Total: 10    Hand  Mass Flexion: Full  Mass Extension: Full  Grasp A: Full  Grasp B: Full  Grasp C: Full  Grasp D: Full  Grasp E: Full  Hand Total: 14    Coordination/Speed  Tremor: None  Dysmetria: None  Time: <1s  Coordination/Speed Total : 6    Total A-D  Total A-D (Motor Function): 66/66     This is a reliable/valid measure of arm function after a neurological event. It has established value to characterize functional status and for measuring spontaneous and therapy-induced recovery; tests proximal and distal motor functions. Fugl-Yan Assessment - UE scores recorded between five and 30 days post neurologic event can be used to predict UE recovery at six months post neurologic event. Severe = 0-21 points   Moderately Severe = 22-33 points   Moderate = 34-47 points   Mild = 48-66 points  Katina Epley, D., LETTY Sabillon, & YAQUELIN Peterson (1992). Measurement of motor recovery after stroke: Outcome assessment and sample size requirements.  Stroke, 23, pp. 1462-5760.   ------------------------------------------------------------------------------------------------------------------------------------------------------------------  MCID:  Stroke:   Jori Garrett et al, 2001; n = 171; mean age 79 (6) years; assessed within 16 (12) days of stroke, Acute Stroke)  FMA Motor Scores from Admission to Discharge   10 point increase in FMA Upper Extremity = 1.5 change in discharge FIM   10 point increase in FMA Lower Extremity = 1.9 change in discharge FIM  MDC:   Stroke:   Rojelio Desai et al, 2008, n = 14, mean age = 59.9 (14.6) years, assessed on average 14 (6.5) months post stroke, Chronic Stroke)   FMA = 5.2 points for the Upper Extremity portion of the assessment     Occupational Therapy Evaluation Charge Determination   History Examination Decision-Making   LOW Complexity : Brief history review  LOW Complexity : 1-3 performance deficits relating to physical, cognitive , or psychosocial skils that result in activity limitations and / or participation restrictions  LOW Complexity : No comorbidities that affect functional and no verbal or physical assistance needed to complete eval tasks       Based on the above components, the patient evaluation is determined to be of the following complexity level: LOW   Pain Rating:  Denies pain at time of eval    Activity Tolerance:   Good    After treatment patient left in no apparent distress:    Sitting in chair, Call bell within reach and Bed / chair alarm activated    COMMUNICATION/EDUCATION:   The patients plan of care was discussed with: Physical therapist and Registered nurse. Patient was educated regarding her deficit(s) of R sided impaired sensation as this relates to her diagnosis of CVA work up. Patient had not seen physican regarding CT/MRI results at this time, will await MD to explain results  She demonstrated Good understanding as evidenced by participation and resolution of symptoms. Patient and/or family was verbally educated on the BE FAST acronym for signs/symptoms of CVA and TIA. BE FAST was written on patient's communication board  for visual education and reinforcement. All questions answered with patient indicating good understanding.      Thank you for this referral.  Manny Meneses OT  Time Calculation: 22 mins

## 2020-12-03 NOTE — DISCHARGE SUMMARY
Discharge Summary       PATIENT ID: Yousif Ferguson  MRN: [id number]   YOB: 1955    DATE OF ADMISSION: 12/2/2020  3:05 PM    DATE OF DISCHARGE: 12/03/20  PRIMARY CARE PROVIDER: Clifton Campos MD       DISCHARGING PROVIDER: Jin Rhodes MD    To contact this individual call [phone] and ask the  to page. If unavailable ask to be transferred the Adult Hospitalist Department. CONSULTATIONS: IP CONSULT TO OTOLARYNGOLOGY  IP CONSULT TO NEUROLOGY      PROCEDURES/SURGERIES: * No surgery found *    IMAGING  Mri Brain Wo Cont    Result Date: 12/3/2020  IMPRESSION: 1. Normal MRI of the brain. 2. Unchanged right parotid lesion. Mri Cerv Spine Wo Cont    Result Date: 12/3/2020  IMPRESSION: Multilevel spondylosis as above, worst at C4-5 and C5-6. Ct Perf W Cbf    Result Date: 12/2/2020  IMPRESSION: There is no major vessel occlusion. No flow limiting stenosis or intracranial aneurysm. No acute intracranial process. Mild cervical atherosclerotic vasculopathy. Severe canal and foraminal stenoses of the cervical spine. This may be the etiology for right arm numbness/weakness. Nonemergent MRI of the cervical spine for full delineation. Right parotid lesion recommend nonemergent ultrasonographic interrogation. 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 ENT consultation as well. Thyroid goiter. Cta Code Neuro Head And Neck W Cont    Result Date: 12/2/2020  IMPRESSION: There is no major vessel occlusion. No flow limiting stenosis or intracranial aneurysm. No acute intracranial process. Mild cervical atherosclerotic vasculopathy. Severe canal and foraminal stenoses of the cervical spine. This may be the etiology for right arm numbness/weakness. Nonemergent MRI of the cervical spine for full delineation. Right parotid lesion recommend nonemergent ultrasonographic interrogation. 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 ENT consultation as well. Thyroid goiter. Ct Code Neuro Head Wo Contrast    Result Date: 12/2/2020  Impression: 1. No acute intracranial abnormality           ADMITTING [facility] COURSE:   Meg Black is a 63 y.o. female who presents with right facial numbness. # Right facial numbness/swelling  concern for parotid infection w/recent biopsy r/o TIA/stroke  Patient admitted to neurology floor and was kept on telemetry, She was started on Aspirin and statin for stoke prevention. MRI of brain and C spine obtained which was unremarkable except the stable parotid gland mass seen. She was evaluated by neurology and unlikely to be a stoke or TIA. Patient was also evaluated by ENT and confirmed no parotid gland infection therefore the IV antibiotic that was started on admission discontinued. Pathology report from recent parotid gland biopsy showing a benign tumor. Patient discharged home as she felt better. Patient with episode of anxiety and excessive stress about work and robert.      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    Advance directive discussed with patient Z71.89    Controlled substance agreement signed Z79.899               PENDING TEST RESULTS:   At the time of discharge the following test results are still pending: None     FOLLOW UP APPOINTMENTS:    Follow-up Information     Follow up With Specialties Details Why Contact Info    Mian Crum MD Internal Medicine In 2 weeks Post hospital discharge follow up  [phone]  [phone]      ENT    Keep already scheduled appointment            ADDITIONAL CARE RECOMMENDATIONS:   · It is important that you take the medication exactly as they are prescribed. · Keep your medication in the bottles provided by the pharmacist and keep a list of the medication names, dosages, and times to be taken in your wallet. · Do not take other medications without consulting your doctor. · No drinking alcohol or driving car or operating machinery if you are on narcotic pain medications. Donot take sedating mediations if you are sleepy or confused. · Fall Precautions  · Keep Well Hydrated  · Report to your medical provider if you feel you have  developed allergies to medications  · Follow up with your PCP or Consultant for medication adjustments and refills  · Monitor for signs of fevers,chills,bleeding,chest pain and seek medical attention if you do so. You can follow with a psychiatrist or psychologist to have full assessment for anxiety or depression   Please use different methods to reduce your anxiety level like exercise. DIET: Healthy heart diet     ACTIVITY: As tolerated, will recommend 30-45 min walk 4-5 times a week     WOUND CARE: None     EQUIPMENT needed: None       DISCHARGE MEDICATIONS:  Current Discharge Medication List      START taking these medications    Details   hydrOXYzine HCL (ATARAX) 25 mg tablet Take 1 Tab by mouth three (3) times daily as needed for Anxiety for up to 10 days. Qty: 30 Tab, Refills: 0         CONTINUE these medications which have NOT CHANGED    Details   cyclobenzaprine (FLEXERIL) 5 mg tablet 1 in Am 2 at night  Qty: 60 Tab, Refills: 0    Associated Diagnoses: Cervicalgia; Numbness and tingling of right arm      TURMERIC PO Take  by mouth. azelastine (ASTELIN) 137 mcg (0.1 %) nasal spray INSTILL 1 SPRAY BY BOTH NOSTRILS ROUTE TWO (2) TIMES A DAY. Qty: 1 Bottle, Refills: 1    Associated Diagnoses:  Allergic rhinitis, unspecified seasonality, unspecified trigger      therapeutic multivitamin (THERAGRAN) tablet Take 1 Tab by mouth daily. fluticasone propionate (FLONASE) 50 mcg/actuation nasal spray 2 Sprays by Both Nostrils route daily. Qty: 1 Bottle, Refills: 0      cholecalciferol (VITAMIN D3) 1,000 unit tablet Take 1,000 Units by mouth daily. VITAMIN B COMPLEX (B COMPLEX 1 PO) Take 1 Cap by mouth daily.              All Micro Results     Procedure Component Value Units Date/Time    CULTURE, BLOOD, PAIRED [[id number]] Collected:  12/02/20 1834    Order Status:  Completed Specimen:  Blood Updated:  12/03/20 0543     Special Requests: NO SPECIAL REQUESTS        Culture result: NO GROWTH AFTER 10 HOURS             Recent Results (from the past 24 hour(s))   CULTURE, BLOOD, PAIRED    Collection Time: 12/02/20  6:34 PM    Specimen: Blood   Result Value Ref Range    Special Requests: NO SPECIAL REQUESTS      Culture result: NO GROWTH AFTER 10 HOURS     CK    Collection Time: 12/02/20 10:15 PM   Result Value Ref Range     (H) 26 - 192 U/L   TROPONIN I    Collection Time: 12/02/20 10:15 PM   Result Value Ref Range    Troponin-I, Qt. <0.05 <0.05 ng/mL   LIPID PANEL    Collection Time: 12/03/20  2:30 AM   Result Value Ref Range    LIPID PROFILE          Cholesterol, total 252 (H) <200 MG/DL    Triglyceride 250 (H) <150 MG/DL    HDL Cholesterol 64 MG/DL    LDL, calculated 138 (H) 0 - 100 MG/DL    VLDL, calculated 50 MG/DL    CHOL/HDL Ratio 3.9 0.0 - 5.0     HEMOGLOBIN A1C WITH EAG    Collection Time: 12/03/20  2:30 AM   Result Value Ref Range    Hemoglobin A1c 6.0 (H) 4.0 - 5.6 %    Est. average glucose 126 mg/dL   CBC W/O DIFF    Collection Time: 12/03/20  2:30 AM   Result Value Ref Range    WBC 6.6 3.6 - 11.0 K/uL    RBC 4.18 3.80 - 5.20 M/uL    HGB 13.4 11.5 - 16.0 g/dL    HCT 39.7 35.0 - 47.0 %    MCV 95.0 80.0 - 99.0 FL    MCH 32.1 26.0 - 34.0 PG    MCHC 33.8 30.0 - 36.5 g/dL    RDW 11.9 11.5 - 14.5 %    PLATELET 346 583 - 673 K/uL MPV 9.7 8.9 - 12.9 FL    NRBC 0.0 0  WBC    ABSOLUTE NRBC 0.00 0.00 - 0.01 K/uL   SED RATE (ESR)    Collection Time: 12/03/20  2:30 AM   Result Value Ref Range    Sed rate, automated 67 (H) 0 - 30 mm/hr   CRP, HIGH SENSITIVITY    Collection Time: 12/03/20  2:30 AM   Result Value Ref Range    CRP, High sensitivity 5.1 mg/L   METABOLIC PANEL, BASIC    Collection Time: 12/03/20  2:30 AM   Result Value Ref Range    Sodium 135 (L) 136 - 145 mmol/L    Potassium 3.3 (L) 3.5 - 5.1 mmol/L    Chloride 104 97 - 108 mmol/L    CO2 24 21 - 32 mmol/L    Anion gap 7 5 - 15 mmol/L    Glucose 130 (H) 65 - 100 mg/dL    BUN 11 6 - 20 MG/DL    Creatinine 0.63 0.55 - 1.02 MG/DL    BUN/Creatinine ratio 17 12 - 20      GFR est AA >60 >60 ml/min/1.73m2    GFR est non-AA >60 >60 ml/min/1.73m2    Calcium 8.5 8.5 - 10.1 MG/DL   TSH 3RD GENERATION    Collection Time: 12/03/20  2:33 AM   Result Value Ref Range    TSH 7.30 (H) 0.36 - 3.74 uIU/mL   CK    Collection Time: 12/03/20  2:33 AM   Result Value Ref Range     (H) 26 - 192 U/L   ECHO ADULT COMPLETE    Collection Time: 12/03/20 11:05 AM   Result Value Ref Range    IVSd 0.91 (A) 0.6 - 0.9 cm    LVIDd 5.54 (A) 3.9 - 5.3 cm    LVIDs 3.30 cm    LVOT d 1.79 cm    LVPWd 0.94 (A) 0.6 - 0.9 cm    LVOT Peak Gradient 6.36 mmHg    LVOT Peak Velocity 126.06 cm/s    RVIDd 3.11 cm    RVSP 31.98 mmHg    Left Atrium Major Axis 4.37 cm    LA Volume 64.16 22 - 52 mL    LA Area 4C 20.04 cm2    LA Vol 2C 66.33 (A) 22 - 52 mL    LA Vol 4C 53.68 (A) 22 - 52 mL    Est. RA Pressure 3.00 mmHg    Aortic Valve Area by Continuity of Peak Velocity 1.63 cm2    AoV PG 15.25 mmHg    Aortic Valve Systolic Peak Velocity 518.31 cm/s    MV A Néstor 81.39 cm/s    Mitral Valve E Wave Deceleration Time 230.90 ms    MV E Néstor 72.05 cm/s    Mitral Valve Pressure Half-time 66.96 ms    MVA (PHT) 3.29 cm2    Pulmonic Valve Systolic Peak Instantaneous Gradient 1.94 mmHg    Tapse 2.02 (A) 1.5 - 2.0 cm    Triscuspid Valve Regurgitation Peak Gradient 28.98 mmHg    TR Max Velocity 269.15 cm/s    Ao Root D 2.98 cm    MV E/A 0.89     LV Mass .9 67 - 162 g    LV Mass AL Index 98.5 43 - 95 g/m2    Left Atrium Minor Axis 2.21 cm    LA Vol Index 32.43 16 - 28 ml/m2    LA Vol Index 33.53 16 - 28 ml/m2    LA Vol Index 27.13 16 - 28 ml/m2    SHANNAN/BSA Pk Néstor 0.8 cm2/m2           NOTIFY YOUR PHYSICIAN FOR ANY OF THE FOLLOWING:   Fever over 101 degrees for 24 hours. Chest pain, shortness of breath, fever, chills, nausea, vomiting, diarrhea, change in mentation, falling, weakness, bleeding. Severe pain or pain not relieved by medications. Or, any other signs or symptoms that you may have questions about. DISPOSITION:  x  Home With:   OT  PT  HH  RN       Long term SNF/Inpatient Rehab    Independent/assisted living    Hospice    Other:       PATIENT CONDITION AT DISCHARGE:     Functional status    Poor     Deconditioned    x Independent      Cognition     Lucid     Forgetful     Dementia      Catheters/lines (plus indication)    Lopez     PICC     PEG    x None      Code status   x  Full code     DNR      PHYSICAL EXAMINATION AT DISCHARGE:  Gen:  No distress, alert  HEENT:  Normal cephalic atraumatic, extra-occular movements are intact. Rigth lateral face mild swelling   Neck:  Supple, No JVD  Lungs:  Clear bilaterally, no wheeze, no rales, normal effort  Heart:  Regular Rate and Rhythm, normal S1 and S2, no edema  Abdomen:  Soft, non tender, normal bowel sounds, no guarding.   Extremities:  Well perfused, no cyanosis or edema  Neurological:  Awake and alert, CN's are intact, normal strength throughout extremities  Skin:  No rashes or moles  Mental Status:  Normal thought process, does not appear anxious      CHRONIC MEDICAL DIAGNOSES:  Problem List as of 12/3/2020 Date Reviewed: 11/12/2020          Codes Class Noted - Resolved    Advance directive discussed with patient (Chronic) ICD-10-CM: Z71.89  ICD-9-CM: V65.49  2/24/2017 - Present Overview Signed 2/24/2017 11:02 AM by Swapna Chavarria MD     2/24/2017 - -the [place] [facility] template given to patient for review and completion. Patient asked to provide us with a copy once it is completed. Controlled substance agreement signed (Chronic) ICD-10-CM: M71.567  ICD-9-CM: V58.69  2/24/2017 - Present    Overview Signed 2/24/2017  4:15 PM by Swapna Chavarria MD     Signed  2/24/2017 for ativan use. Colon cancer screening (Chronic) ICD-10-CM: Z12.11  ICD-9-CM: V76.51  8/22/2014 - Present    Overview Addendum 2/11/2020  3:41 PM by MD Dr. Corinne Gould, 1/23/2020. Follow up in 10 years. Squamous cell carcinoma of foot ICD-10-CM: C44.721  ICD-9-CM: 173.72  8/22/2014 - Present    Overview Signed 8/22/2014  5:53 PM by Wandalee Drier, MD Dr. Colletta Ards.             Allergic rhinitis ICD-10-CM: J30.9  ICD-9-CM: 477.9  7/22/2014 - Present        Pap smear for cervical cancer screening (Chronic) ICD-10-CM: Z12.4  ICD-9-CM: V76.2  7/23/2012 - Present    Overview Addendum 2/24/2017 10:44 AM by Swapna Chavarria MD     2/24/2017   , Dr. Good Montoya             History of screening mammography (Chronic) ICD-10-CM: Z92.89  ICD-9-CM: V15.89  7/23/2012 - Present    Overview Addendum 11/27/2020  5:24 PM by Swapna Chavarria MD     11/25/20, [address] [place] [address]             History of kidney stones ICD-10-CM: P45.341  ICD-9-CM: V13.01  7/23/2012 - Present    Overview Signed 7/23/2012 10:58 AM by Swapna Chavarria MD     2005             Hemorrhoid ICD-10-CM: K64.9  ICD-9-CM: 455.6  10/12/2011 - Present        Fibrocystic breast ICD-10-CM: N60.19  ICD-9-CM: 610.1  10/12/2011 - Present        Anxiety ICD-10-CM: F41.9  ICD-9-CM: 300.00  10/12/2011 - Present        RESOLVED: TIA (transient ischemic attack) ICD-10-CM: G45.9  ICD-9-CM: 435.9  11/11/2020 - 11/12/2020        RESOLVED: Cat bite ICD-10-CM: W55. 01XA  ICD-9-CM: 879.8, E906.3  4/17/2016 - 2/24/2017        RESOLVED: Allergy to sulfa drugs ICD-10-CM: Z88.2  ICD-9-CM: V14.2  2/15/2016 - 2/24/2017        RESOLVED: Atopic dermatitis ICD-10-CM: L20.9  ICD-9-CM: 691.8  7/23/2012 - 2/24/2017        RESOLVED: Cellulitis and abscess of hand ICD-10-CM: L03.119, L02.519  ICD-9-CM: 682.4  10/28/2011 - 7/23/2012        RESOLVED: Pneumonia ICD-10-CM: J18.9  ICD-9-CM: 582  10/12/2011 - 7/23/2012        RESOLVED: Dermatitis ICD-10-CM: L30.9  ICD-9-CM: 692.9  10/12/2011 - 7/23/2012                Discussed with patient and family. Explained the importance of following up, Compliance with medications and recommendations on discharge,Side effect profile of medications were explained. Safety precautions at home and while taking pain medications also explained. All questions answered to the satisfaction of the patient/family. Discussed with consultant(s) who are agreeable to the discharge. Verbal and written instructions on discharge given. Explained about Discharge medications and side effect profile. Advised patient/family to followup with their pcp for medication refills and preauthorization of medications, Home health orders. checkups,screenign programs as appropriate for age. Thank you Swapna Chavarria MD for taking care of your patient, Please call with any questions. Greater than 35 minutes were spent with the patient on counseling and coordination of care    Signed:   Radha Sultana MD  12/3/2020  5:50 PM    Please note that this dictation was completed with Code Blue, the [employer] voice recognition software. Quite often unanticipated grammatical, syntax, homophones, and other interpretive errors are inadvertently transcribed by the computer software. Please disregard these errors. Please excuse any errors that have escaped final proofreading. Thank you.

## 2020-12-03 NOTE — PROGRESS NOTES
Problem: General Medical Care Plan  Goal: *Vital signs within specified parameters  Outcome: Progressing Towards Goal  Goal: *Labs within defined limits  Outcome: Progressing Towards Goal  Goal: *Absence of infection signs and symptoms  Outcome: Progressing Towards Goal  Goal: *Optimal pain control at patient's stated goal  Outcome: Progressing Towards Goal  Goal: *Optimize nutritional status  Outcome: Progressing Towards Goal  Goal: *Anxiety reduced or absent  Outcome: Progressing Towards Goal

## 2020-12-03 NOTE — PROGRESS NOTES
Occupational Therapy    Orders received, chart reviewed and patient evaluated by occupational therapy. Pending progression with skilled acute occupational therapy, recommend:  No skilled occupational therapy/ follow up rehabilitation needs identified at this time. R face and UE symptoms resolved when seen for OT session. Head CT and MRI negative for acute changes, positive for spondylosis C4-5 and C5-6 with severe canal and foraminal stenosis of cervical spine. Fugl Yan and vision WDL. No additional acute OT needs at this time. Recommend with nursing patient to complete as able in order to maintain strength, endurance and independence: OOB to chair 3x/day with supervision and functional mobility to the bathroom with supervision. Thank you for your assistance. Full evaluation to follow.        Tristan Alcocer, OT

## 2020-12-03 NOTE — PROGRESS NOTES
PHYSICAL THERAPY EVALUATION/DISCHARGE  Patient: Sloane Bradshaw (13 y.o. female)  Date: 12/3/2020  Primary Diagnosis: CVA (cerebral vascular accident) [facility]) [I63.9]       Precautions:          ASSESSMENT  Based on the objective data described below, the patient presents with indep LOF with mobility s/p admit for neuro work up due to numbness R UE and mouth. Of note, pt s/p recent biopsy R parotid gland. Brain MRI negative; MRI of C spine showed multilevel spondylosis, worst at C4-5 C5-6. Pt receiving abx for possible parotid infection. Pt demo indep LOF with mobility. Tolerated amb on unit without difficulty; stairs not assessed due to IV abx, however no concerns about pt ability to manage home environment. No further PT needs identified. Functional Outcome Measure: The patient scored 51/56 on the Alvarado balance outcome measure which is indicative of low risk for fall. Other factors to consider for discharge: pt demo baseline LOF with mobility     Further skilled acute physical therapy is not indicated at this time. PLAN :  Recommendation for discharge: (in order for the patient to meet his/her long term goals)  No skilled physical therapy/ follow up rehabilitation needs identified at this time. This discharge recommendation:  Has not yet been discussed the attending provider and/or case management    IF patient discharges home will need the following DME: none       SUBJECTIVE:   Patient stated I don't think I had a stroke    OBJECTIVE DATA SUMMARY:   HISTORY:    Past Medical History:   Diagnosis Date    Anxiety     Dermatitis     atypical    Fibrocystic breast     Hemorrhoid     History of kidney stones     Last times was circa .     History of vertigo      Past Surgical History:   Procedure Laterality Date    HX GYN       x2    HX ORTHOPAEDIC      cyst removed left 3rd digit - Dr Mares Side HX OTHER SURGICAL  10/28/2011    Incision and drainage of septoc left 3rd finger joint - catbite    HX OTHER SURGICAL  2014    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 Prior level of function: indep without use of AD, works, drives  Personal factors and/or comorbidities impacting plan of care: recent biopsy R carotid    Home Situation  Home Environment: Private residence  # Steps to Enter: 8  Rails to Enter: Yes  One/Two Story Residence: Two story(three floors- basement for laundry and cat litter)  Living Alone: No  Support Systems: Friends \ neighbors, Spouse/Significant Other/Partner  Current DME Used/Available at Home: None  Tub or Shower Type: Tub/Shower combination    EXAMINATION/PRESENTATION/DECISION MAKING:   Critical Behavior:  Neurologic State: Alert, Appropriate for age  Orientation Level: (P) Oriented X4  Cognition: (P) Follows commands     Hearing: Auditory  Auditory Impairment: None    Range Of Motion:  AROM: Within functional limits                       Strength:    Strength:  Within functional limits                    Tone & Sensation:   Tone: Normal              Sensation: Intact               Coordination:  Coordination: Within functional limits  Vision:   Acuity: Within Defined Limits(hx of R floater- annual follow up)  Functional Mobility:  Bed Mobility:  Rolling: Independent  Supine to Sit: Independent  Sit to Supine: Independent  Scooting: Independent  Transfers:  Sit to Stand: Independent  Stand to Sit: Independent        Bed to Chair: Independent              Balance:   Sitting: Intact  Standing: Intact  Ambulation/Gait Training:  Distance (ft): 300 Feet (ft)     Ambulation - Level of Assistance: Independent     Gait Description (WDL): Within defined limits           Functional Measure:  Alvarado Balance Test:    Sitting to Standing: 3  Standing Unsupported: 4  Sitting with Back Unsupported: 4  Standing to Sittin  Transfers: 4  Standing Unsupported with Eyes Closed: 4  Standing Unsupported with Feet Together: 4  Reach Forward with Outstretched Arm: 4   Object: 4  Turn to Look Over Shoulders: 4  Turn 360 Degrees: 4  Alternate Foot on Step/Stool: 4  Standing Unsupported One Foot in Front: 2  Stand on One Le  Total: 51/56         56=Maximum possible score;   0-20=High fall risk  21-40=Moderate fall risk   41-56=Low fall risk                  Physical Therapy Evaluation Charge Determination   History Examination Presentation Decision-Making   MEDIUM  Complexity : 1-2 comorbidities / personal factors will impact the outcome/ POC  LOW Complexity : 1-2 Standardized tests and measures addressing body structure, function, activity limitation and / or participation in recreation  LOW Complexity : Stable, uncomplicated  LOW Complexity : FOTO score of       Based on the above components, the patient evaluation is determined to be of the following complexity level: LOW     Pain Rating:  No c/o pain    Activity Tolerance:   Good      After treatment patient left in no apparent distress:   Supine in bed and Call bell within reach    COMMUNICATION/EDUCATION:   The patients plan of care was discussed with: Occupational therapist and Registered nurse. Fall prevention education was provided and the patient/caregiver indicated understanding., Patient/family have participated as able in goal setting and plan of care. and Patient/family agree to work toward stated goals and plan of care.     Thank you for this referral.  Sully Clark, PT   Time Calculation: 18 mins

## 2020-12-03 NOTE — PROGRESS NOTES
SLP Contact Note    Consult received and appreciated. Pt chart reviewed. MRI negative for acute infarct. Pt passed STAND and is on diet. Therefore, no further SLP needs. Will sign-off. Please reconsult should SLP be of any assistance.       Thank you,  STACY Crane.Ed, [id number] [facility]  Speech-Language Pathologist

## 2020-12-03 NOTE — ROUTINE PROCESS
Bedside shift change report given to Michela N Pennie Shah (oncoming nurse) by Farhan Monte RN (offgoing nurse). Report included the following information SBAR, Kardex, ED Summary, Intake/Output, MAR, Cardiac Rhythm NSR and Dual Neuro Assessment.

## 2020-12-03 NOTE — PROGRESS NOTES
Problem: General Medical Care Plan  Goal: *Vital signs within specified parameters  12/3/2020 1825 by Cathlean Lightning  Outcome: Resolved/Not Met  12/3/2020 1823 by Cathlean Lightning  Outcome: Progressing Towards Goal  Goal: *Labs within defined limits  12/3/2020 1825 by Cathlean Lightning  Outcome: Resolved/Not Met  12/3/2020 1823 by Cathlean Lightning  Outcome: Progressing Towards Goal  Goal: *Absence of infection signs and symptoms  12/3/2020 1825 by Cathlean Lightning  Outcome: Resolved/Not Met  12/3/2020 1823 by Cathlean Lightning  Outcome: Progressing Towards Goal  Goal: *Optimal pain control at patient's stated goal  12/3/2020 1825 by Cathlean Lightning  Outcome: Resolved/Not Met  12/3/2020 1823 by Cathlean Lightning  Outcome: Progressing Towards Goal  Goal: *Skin integrity maintained  12/3/2020 1825 by Cathlean Lightning  Outcome: Resolved/Not Met  12/3/2020 1823 by Cathlean Lightning  Outcome: Progressing Towards Goal  Goal: *Fluid volume balance  12/3/2020 1825 by Cathlean Lightning  Outcome: Resolved/Not Met  12/3/2020 1823 by Cathlean Lightning  Outcome: Progressing Towards Goal  Goal: *Optimize nutritional status  12/3/2020 1825 by Cathlean Lightning  Outcome: Resolved/Not Met  12/3/2020 1823 by Cathlean Lightning  Outcome: Progressing Towards Goal  Goal: *Anxiety reduced or absent  12/3/2020 1825 by Cathlean Lightning  Outcome: Resolved/Not Met  12/3/2020 1823 by Cathlean Lightning  Outcome: Progressing Towards Goal  Goal: *Progressive mobility and function (eg: ADL's)  12/3/2020 1825 by Cathlean Lightning  Outcome: Resolved/Not Met  12/3/2020 1823 by Cathlean Lightning  Outcome: Progressing Towards Goal     Problem: Patient Education: Go to Patient Education Activity  Goal: Patient/Family Education  12/3/2020 1825 by Cathlean Lightning  Outcome: Resolved/Not Met  12/3/2020 1823 by Cathlean Lightning  Outcome: Progressing Towards Goal

## 2020-12-03 NOTE — PROGRESS NOTES
PLEASE NOTE--Encounter / Re-Admission Within 30 Days  This patient has had another encounter or admission within the last 30 days. This patient was last admitted in the hospital [facility]) from 11/11/2020 to 11/12/2020 due to TIA. Transition of Care Plan   RUR- N/A Low Risks    DISPOSITION: The disposition plan is home with family assistance; pending medical progression    Transport: Family; , Mikayla Banks    Reason for Admission: Facial numbness                   RUR Score: N/A                    Plan for utilizing home health: Therapy is not recommending home health at this time. PCP: First and Last name:  Desean Phan MD   Name of Practice: [facility] [place] [facility]   Are you a current patient: Yes/No: Yes   Approximate date of last visit: 11/16/2020- with NP at practice   Can you participate in a virtual visit with your PCP: Yes                     Current Advanced Directive/Advance Care Plan: Patient does not have ACP documents on file at this time. Transition of Care Plan: The disposition plan is TBD pending care recommendations, but most likely going home with family assistance. Reviewed chart for transitions of care,and discussed in rounds. CM met with patient at bedside to explain role and offer support. Patient is alert and oriented x4, and confirmed demographics. Patient lives in a two story home with her . There are eight steps to get inside. She is independent and does not require DMEs to ambulate. Her preferred pharmacy is [facility] on [address]. Patient's  is expected to transport home at discharge. Observation notice provided in writing to patient and/or caregiver as well as verbal explanation of the policy. Patients who are in outpatient status also receive the Observation notice.       Readmission Assessment  Number of days since last admission?: 8-30 days  Previous disposition: Home with Family  Who is being interviewed?: Patient  What was the patient's/caregiver's perception as to why they think they needed to return back to the hospital?: Other (Comment)(New symptoms)  Did you visit your Primary Care Physician after you left the hospital, before you returned this time?: Yes  Did you see a specialist, such as Cardiac, Pulmonary, Orthopedic Physician, etc. after you left the hospital?: Yes(neurology)  Who advised the patient to return to the hospital?: Self-referral  Does the patient report anything that got in the way of taking their medications?: No  In our efforts to provide the best possible care to you and others like you, can you think of anything that we could have done to help you after you left the hospital the first time, so that you might not have needed to return so soon?: Other (Comment)(Nothing, there were just new symptoms)    Clarissa Valdovinos, [address] Work Student

## 2020-12-03 NOTE — PROGRESS NOTES
CM attempted to meet with patient at bedside but other care team members in there. CM will follow up at a later time.      Parmjit Montes De Oca-Lens  Social Work Student

## 2020-12-04 ENCOUNTER — PATIENT OUTREACH (OUTPATIENT)
Dept: CASE MANAGEMENT | Age: 65
End: 2020-12-04

## 2020-12-04 NOTE — PROGRESS NOTES
Patient was admitted to [facility] on 2020 and discharged on 12/3/2020 for numbness and tingling to right side of face. Patient had a biopsy of right parotid biopsy on 2020. Outreach made within 2 business days of discharge: Yes    Top Discharge Challenges to be reviewed by the provider   -TSH  7.3  -CT notes thyroid goiter  -No ACP on file    Discussed COVID-19 related testing which was not done at this time. Method of communication with provider : chart routing       Advance Care Planning:   Does patient have an Advance Directive:  will discuss next call. Inpatient Readmission Risk score: n/a  Was this a readmission? yes   Patient stated reason for the admission: Numbness in face. Patients top risk factors for readmission: none noted at this time. Interventions to address risk factors: n/a    Care Transition Nurse (CTN) contacted the patient by telephone to perform post hospital discharge assessment. Verified name and  with patient as identifiers. Provided introduction to self, and explanation of the CTN role. CTN reviewed discharge instructions, medical action plan and red flags with patient who verbalized understanding. Patient given an opportunity to ask questions and does not have any further questions or concerns at this time. The patient agrees to contact the PCP office for questions related to their healthcare. Medication reconciliation was performed with patient, who verbalizes understanding of administration of home medications. Advised obtaining a 90-day supply of all daily and as-needed medications. Referral to Pharm D needed: no     Home Health/Outpatient orders at discharge: none      Durable Medical Equipment ordered at discharge: none. Covid Risk Education    Patient has following risk factors of: no known risk factors.  Education provided regarding infection prevention, and signs and symptoms of COVID-19 and when to seek medical attention with patient who verbalized understanding. Discussed exposure protocols and quarantine From CDC: Are you at higher risk for severe illness?  and given an opportunity for questions and concerns. The patient agrees to contact the COVID-19 hotline 126-982-9895 or PCP office for questions related to COVID-19. For more information on steps you can take to protect yourself, see CDC's How to Protect Yourself     Patient/family/caregiver given information for GetWell Loop and agrees to enroll no    Discussed follow-up appointments. If no appointment was previously scheduled, appointment scheduling offered: yes  [facility] follow up appointment(s): Patient reports she will call PCP for appt  Future Appointments   Date Time Provider Colette Janice   12/7/2020 10:00 AM MD SHONNA Weaver BS AMB   1/25/2021 12:40 PM MD SHONNA Weaver BS AMB   3/11/2021  3:20 PM Heber Nuñez MD NEUSM BS AMB     [facility] follow up appointment(s): Patient to call ENT  Plan for follow-up call in 7-10 days based on severity of symptoms and risk factors. CTN provided contact information for future needs. Goals Addressed                 This Visit's Progress     ACP        12/4/2020  -No ACP on file. CTN to offer education next call. Anahy Vivas Attends follow-up appointments as directed. 12/4/2020  -Patient to call PCP for follow up   -patient to call ENT for follow up  -declines CTN offer to coordinate scheduling. -CTN to follow up in 1 week.  Prevent complications post hospitalization. 12/4/2020  -reports that the right side of her face is more swollen this AM.Numbness is unchanged. Will call ENT office this morning to discuss with Dr. Sumit Bravo. Denies fever, redness, heat or drainage to biopsy sight.  -Treated for Anxiety in the ED. Given a Rx for Atarax. Patient reports that it \" put her right to sleep. \" Will discuss with PCP at follow up per patient.   -CTN to follow up in 1 week  Regina DONOVAN

## 2020-12-07 LAB
BACTERIA SPEC CULT: NORMAL
SERVICE CMNT-IMP: NORMAL

## 2020-12-08 ENCOUNTER — OFFICE VISIT (OUTPATIENT)
Dept: INTERNAL MEDICINE CLINIC | Age: 65
End: 2020-12-08
Payer: COMMERCIAL

## 2020-12-08 VITALS
HEART RATE: 76 BPM | HEIGHT: 67 IN | BODY MASS INDEX: 30.01 KG/M2 | RESPIRATION RATE: 16 BRPM | OXYGEN SATURATION: 95 % | SYSTOLIC BLOOD PRESSURE: 144 MMHG | TEMPERATURE: 97.5 F | WEIGHT: 191.2 LBS | DIASTOLIC BLOOD PRESSURE: 92 MMHG

## 2020-12-08 DIAGNOSIS — R60.0 SWELLING OF RIGHT PAROTID GLAND: ICD-10-CM

## 2020-12-08 DIAGNOSIS — E87.6 LOW SERUM POTASSIUM LEVEL: ICD-10-CM

## 2020-12-08 DIAGNOSIS — R79.89 ELEVATED TSH: ICD-10-CM

## 2020-12-08 DIAGNOSIS — Z09 HOSPITAL DISCHARGE FOLLOW-UP: ICD-10-CM

## 2020-12-08 DIAGNOSIS — R79.89 ELEVATED TSH: Primary | ICD-10-CM

## 2020-12-08 LAB
ALBUMIN SERPL-MCNC: 4.4 G/DL (ref 3.5–5)
ALBUMIN/GLOB SERPL: 1.5 {RATIO} (ref 1.1–2.2)
ALP SERPL-CCNC: 93 U/L (ref 45–117)
ALT SERPL-CCNC: 134 U/L (ref 12–78)
ANION GAP SERPL CALC-SCNC: 6 MMOL/L (ref 5–15)
AST SERPL-CCNC: 50 U/L (ref 15–37)
BILIRUB SERPL-MCNC: 0.3 MG/DL (ref 0.2–1)
BUN SERPL-MCNC: 14 MG/DL (ref 6–20)
BUN/CREAT SERPL: 22 (ref 12–20)
CALCIUM SERPL-MCNC: 9.4 MG/DL (ref 8.5–10.1)
CHLORIDE SERPL-SCNC: 104 MMOL/L (ref 97–108)
CO2 SERPL-SCNC: 28 MMOL/L (ref 21–32)
CREAT SERPL-MCNC: 0.65 MG/DL (ref 0.55–1.02)
ERYTHROCYTE [DISTWIDTH] IN BLOOD BY AUTOMATED COUNT: 11.9 % (ref 11.5–14.5)
GLOBULIN SER CALC-MCNC: 3 G/DL (ref 2–4)
GLUCOSE SERPL-MCNC: 101 MG/DL (ref 65–100)
HCT VFR BLD AUTO: 41.4 % (ref 35–47)
HGB BLD-MCNC: 14.1 G/DL (ref 11.5–16)
MCH RBC QN AUTO: 32.1 PG (ref 26–34)
MCHC RBC AUTO-ENTMCNC: 34.1 G/DL (ref 30–36.5)
MCV RBC AUTO: 94.3 FL (ref 80–99)
NRBC # BLD: 0 K/UL (ref 0–0.01)
NRBC BLD-RTO: 0 PER 100 WBC
PLATELET # BLD AUTO: 277 K/UL (ref 150–400)
PMV BLD AUTO: 9.8 FL (ref 8.9–12.9)
POTASSIUM SERPL-SCNC: 3.8 MMOL/L (ref 3.5–5.1)
PROT SERPL-MCNC: 7.4 G/DL (ref 6.4–8.2)
RBC # BLD AUTO: 4.39 M/UL (ref 3.8–5.2)
SODIUM SERPL-SCNC: 138 MMOL/L (ref 136–145)
T4 FREE SERPL-MCNC: 0.8 NG/DL (ref 0.8–1.5)
TSH SERPL DL<=0.05 MIU/L-ACNC: 3.94 UIU/ML (ref 0.36–3.74)
WBC # BLD AUTO: 7.4 K/UL (ref 3.6–11)

## 2020-12-08 PROCEDURE — 1111F DSCHRG MED/CURRENT MED MERGE: CPT | Performed by: NURSE PRACTITIONER

## 2020-12-08 PROCEDURE — 99496 TRANSJ CARE MGMT HIGH F2F 7D: CPT | Performed by: NURSE PRACTITIONER

## 2020-12-08 RX ORDER — SERTRALINE HYDROCHLORIDE 25 MG/1
25 TABLET, FILM COATED ORAL DAILY
Qty: 30 TAB | Refills: 0 | Status: SHIPPED | OUTPATIENT
Start: 2020-12-08 | End: 2021-01-07

## 2020-12-08 NOTE — PROGRESS NOTES
Transitional Care Management Progress Note    Patient: Sloane Bradshaw  : 1955  PCP: Uriel Aparicio MD    Date of admission: 20  Date of discharge: 12/3/20    Patient was contacted by Transitional Care Management services within two days after her discharge: Yes. This encounter and supporting documentation was reviewed if available. Medication reconciliation was performed today (2020). Assessment/Plan:   Diagnoses and all orders for this visit:    1. Elevated TSH  -     TSH 3RD GENERATION; Future  -     T4, FREE; Future    2. Low serum potassium level  -     METABOLIC PANEL, COMPREHENSIVE; Future    3. Swelling of right parotid gland  -     CBC W/O DIFF; Future    4. Hospital discharge follow-up  -     SC DISCHARGE MEDS RECONCILED W/ CURRENT OUTPATIENT MED LIST    Other orders  -     sertraline (ZOLOFT) 25 mg tablet; Take 1 Tab by mouth daily for 30 days. start zoloft 12.5 mg x 1 week then increase to 25 mg  Follow up scheduled with Dr. Dmitriy Rose on   Follow up scheduled with Dr. Fer Bennett this week  Labs ordered       Subjective:   Sloane Bradshaw is a 63 y.o. female presenting today for follow-up after being discharged from [facility].  The discharge summary was reviewed. The main problem requiring admission was swelling and redness of right parotid gland post biopsy(20), which was causing tingling/numbness of right side of face and neck and into right arm. MRI of brain was normal. Patient reports history of TIA on . Imaging was normal at that time also. Patient had elevated TSH during hospital stay and low potassium. Patient reports significant increased in stress related to her health concerns, 's health concerns, and stress at work. She was tearful during hospitalization without explanation.  Complications during admission: none    Interval history/Current status: improving- parotid gland still swollen with no redness or warmth; no drainage    Admitting symptoms have: improved      Medications marked \"taking\" at this time:  Home Medications    Medication Sig Start Date End Date Taking? Authorizing Provider   sertraline (ZOLOFT) 25 mg tablet Take 1 Tab by mouth daily for 30 days. 12/8/20 1/7/21 Yes Stephanie COLEY NP   TURMERIC PO Take  by mouth. Yes Provider, Historical   azelastine (ASTELIN) 137 mcg (0.1 %) nasal spray INSTILL 1 SPRAY BY BOTH NOSTRILS ROUTE TWO (2) TIMES A DAY. 11/16/20  Yes Jace Fishman NP   therapeutic multivitamin [facility]) tablet Take 1 Tab by mouth daily. Yes Provider, Historical   fluticasone propionate (FLONASE) 50 mcg/actuation nasal spray 2 Sprays by Both Nostrils route daily. 9/20/19  Yes Stephanie COLEY NP   cholecalciferol (VITAMIN D3) 1,000 unit tablet Take 1,000 Units by mouth daily. Yes Provider, Historical   VITAMIN B COMPLEX (B COMPLEX 1 PO) Take 1 Cap by mouth daily. Yes Provider, Historical   hydrOXYzine HCL (ATARAX) 25 mg tablet Take 1 Tab by mouth three (3) times daily as needed for Anxiety for up to 10 days.  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    Advance directive discussed with patient Z71.89    Controlled substance agreement signed Z79.899     Patient Active Problem List    Diagnosis Date Noted    Advance directive discussed with patient 02/24/2017    Controlled substance agreement signed 02/24/2017    Colon cancer screening 08/22/2014    Squamous cell carcinoma of foot 08/22/2014    Allergic rhinitis 07/22/2014    Pap smear for cervical cancer screening 2012    History of screening mammography 2012    History of kidney stones 2012    Hemorrhoid 10/12/2011    Fibrocystic breast 10/12/2011    Anxiety 10/12/2011     Current Outpatient Medications   Medication Sig Dispense Refill    sertraline (ZOLOFT) 25 mg tablet Take 1 Tab by mouth daily for 30 days. 30 Tab 0    TURMERIC PO Take  by mouth.  azelastine (ASTELIN) 137 mcg (0.1 %) nasal spray INSTILL 1 SPRAY BY BOTH NOSTRILS ROUTE TWO (2) TIMES A DAY. 1 Bottle 1    therapeutic multivitamin (THERAGRAN) tablet Take 1 Tab by mouth daily.  fluticasone propionate (FLONASE) 50 mcg/actuation nasal spray 2 Sprays by Both Nostrils route daily. 1 Bottle 0    cholecalciferol (VITAMIN D3) 1,000 unit tablet Take 1,000 Units by mouth daily.  VITAMIN B COMPLEX (B COMPLEX 1 PO) Take 1 Cap by mouth daily.  hydrOXYzine HCL (ATARAX) 25 mg tablet Take 1 Tab by mouth three (3) times daily as needed for Anxiety for up to 10 days. 30 Tab 0    cyclobenzaprine (FLEXERIL) 5 mg tablet 1 in Am 2 at night 60 Tab 0     Allergies   Allergen Reactions    Sulfa (Sulfonamide Antibiotics) Hives     1 week after starting Bactrim - 2016    Clindamycin Rash    Keflex [Cephalexin] Rash    Morphine Other (comments)     Dysphoria/anxiety    Pcn [Penicillins] Rash     Past Medical History:   Diagnosis Date    Anxiety     Dermatitis     atypical    Fibrocystic breast     Hemorrhoid     History of kidney stones     Last times was circa .  History of vertigo      Past Surgical History:   Procedure Laterality Date    HX GYN       x2    HX ORTHOPAEDIC      cyst removed left 3rd digit - Dr Marvene Canavan HX OTHER SURGICAL  10/28/2011    Incision and drainage of septoc left 3rd finger joint - catbite    HX OTHER SURGICAL  2014    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      Family History   Problem Relation Age of Onset    Arthritis-osteo Mother    [place] [facility] Cancer Father         lung -heavy smoker    Cancer Maternal Aunt         breast    Breast Cancer Maternal Aunt     Breast Cancer Maternal Aunt      Social History     Tobacco Use    Smoking status: Current Every Day Smoker     Packs/day: 0.50     Years: 35.00     Pack years: 17.50     Types: Cigarettes    Smokeless tobacco: Never Used   Substance Use Topics    Alcohol use: Yes     Alcohol/week: 3.3 standard drinks     Types: 4 Glasses of wine per week          Objective:   BP (!) 144/92 (BP 1 Location: Left arm, BP Patient Position: Sitting)   Pulse 76   Temp 97.5 °F (36.4 °C) (Temporal)   Resp 16   Ht 5' 7\" (1.702 m)   Wt 191 lb 3.2 oz (86.7 kg)   LMP 10/28/2002 (Exact Date) Comment: age 54  SpO2 95%   BMI 29.95 kg/m²      Physical Exam  Constitutional:       Appearance: Normal appearance. HENT:      Head:      Comments: Right parotid swelling; mild tenderness with palpation; no redness or warmth  Neck:      Musculoskeletal: Normal range of motion. Cardiovascular:      Rate and Rhythm: Normal rate and regular rhythm. Pulmonary:      Effort: Pulmonary effort is normal.      Breath sounds: Normal breath sounds. Neurological:      General: No focal deficit present. Mental Status: She is alert and oriented to person, place, and time. Psychiatric:         Attention and Perception: Attention normal.         Mood and Affect: Mood is anxious. Speech: Speech normal.         Behavior: Behavior normal.         Thought Content: Thought content normal.              We discussed the expected course, resolution and complications of the diagnosis(es) in detail. Medication risks, benefits, costs, interactions, and alternatives were discussed as indicated. I advised her to contact the office if her condition worsens, changes or fails to improve as anticipated. She expressed understanding with the diagnosis(es) and plan.      Jos Sanchez NP

## 2020-12-10 ENCOUNTER — TRANSCRIBE ORDER (OUTPATIENT)
Dept: SCHEDULING | Age: 65
End: 2020-12-10

## 2020-12-10 DIAGNOSIS — E04.2 NONTOXIC MULTINODULAR GOITER: Primary | ICD-10-CM

## 2020-12-11 NOTE — DISCHARGE INSTRUCTIONS
Discharge Instructions       PATIENT ID: Grecia Cleveland  MRN: [id number]   YOB: 1955    DATE OF ADMISSION: 12/2/2020  3:05 PM    DATE OF DISCHARGE: 12/3/2020    PRIMARY CARE PROVIDER: Alfonso Quesada MD     ATTENDING PHYSICIAN: Deonte Jimenez MD  DISCHARGING PROVIDER: Nanda Be MD    To contact this individual call [phone] and ask the  to page. If unavailable ask to be transferred the Adult Hospitalist Department. DISCHARGE DIAGNOSES  # Right facial and arm numbness   # Right facial swelling   # Possible Anxiety     CONSULTATIONS: IP CONSULT TO OTOLARYNGOLOGY  IP CONSULT TO NEUROLOGY    PROCEDURES/SURGERIES: * No surgery found *    PENDING TEST RESULTS:   At the time of discharge the following test results are still pending: None     FOLLOW UP APPOINTMENTS:   Follow-up Information     Follow up With Specialties Details Why Contact Info    Alfonso Quesada MD Internal Medicine In 2 weeks Post hospital discharge follow up  [address]   [address] [place] [address]  [phone]      ENT    Keep already scheduled appointment            ADDITIONAL CARE RECOMMENDATIONS:   You can follow with a psychiatrist or psychologist to have full assessment for anxiety or depression   Please use different methods to reduce your anxiety level like exercise. DIET: Healthy heart diet     ACTIVITY: As tolerated, will recommend 30-45 min walk 4-5 times a week     WOUND CARE: None     EQUIPMENT needed: None       Radiology      DISCHARGE MEDICATIONS:   See Medication Reconciliation Form    · It is important that you take the medication exactly as they are prescribed. · Keep your medication in the bottles provided by the pharmacist and keep a list of the medication names, dosages, and times to be taken in your wallet. · Do not take other medications without consulting your doctor. NOTIFY YOUR PHYSICIAN FOR ANY OF THE FOLLOWING:   Fever over 101 degrees for 24 hours.    Chest pain, shortness of breath, fever, chills, nausea, vomiting, diarrhea, change in mentation, falling, weakness, bleeding. Severe pain or pain not relieved by medications. Or, any other signs or symptoms that you may have questions about.       DISPOSITION:  x  Home With:   OT  PT  HH  RN       SNF/Inpatient Rehab/LTAC    Independent/assisted living    Hospice    Other:     CDMP Checked:   Yes ***     PROBLEM LIST Updated:  Yes ***       Signed:   Inocencia Boss MD  12/3/2020  5:46 PM

## 2020-12-15 ENCOUNTER — PATIENT OUTREACH (OUTPATIENT)
Dept: CASE MANAGEMENT | Age: 65
End: 2020-12-15

## 2020-12-15 ENCOUNTER — TELEPHONE (OUTPATIENT)
Dept: INTERNAL MEDICINE CLINIC | Age: 65
End: 2020-12-15

## 2020-12-15 DIAGNOSIS — R74.8 ELEVATED LIVER ENZYMES: Primary | ICD-10-CM

## 2020-12-15 NOTE — PROGRESS NOTES
Goals      ACP      12/4/2020  -No ACP on file. CTN to offer education next call. Rosie Boyer Attends follow-up appointments as directed. 12/15/2020  -Patient reports she was seen by ENT and currently has surgery planned in January. Has decided she would like a 2nd opinion. Plans on seeing a ENT named Dr. Marlen Mayo  -Will follow up with Neurology on 1/11/21  -reports she followed up with PCP. -CTN to follow up in 2 weeks  SP  12/4/2020  -Patient to call PCP for follow up   -patient to call ENT for follow up  -declines CTN offer to coordinate scheduling. -CTN to follow up in 1 week.  Prevent complications post hospitalization. 12/15/2020  -Patient addressed with ENT. Biopdy site healed.  -Patient addressed Anxiety and Atarax with PCP. Was given a RX for lorazepam and Zoloft. -CTN to follow up in 2 weeks  12/4/2020  -reports that the right side of her face is more swollen this AM.Numbness is unchanged. Will call ENT office this morning to discuss with Dr. Erin Kramer. Denies fever, redness, heat or drainage to biopsy sight.  -Treated for Anxiety in the ED. Given a Rx for Atarax. Patient reports that it \" put her right to sleep. \" Will discuss with PCP at follow up per patient.   -CTN to follow up in 1 week  Regina DONOVAN

## 2020-12-17 ENCOUNTER — TELEPHONE (OUTPATIENT)
Dept: INTERNAL MEDICINE CLINIC | Age: 65
End: 2020-12-17

## 2020-12-17 DIAGNOSIS — R74.8 ELEVATED LIVER ENZYMES: Primary | ICD-10-CM

## 2020-12-17 NOTE — TELEPHONE ENCOUNTER
----- Message from Jos Sanchez NP sent at 12/9/2020  8:12 AM EST -----    ----- Message -----  From: Ana Iyer In [place]  Sent: 12/8/2020  11:15 PM EST  To: Jos Sanchez NP

## 2020-12-18 NOTE — TELEPHONE ENCOUNTER
Patient notified that her liver enzymes are elevated      She reports that she is drinking 1 glass of wine nightly. (per review of her noted, her  reported to Subhash Lynn, on 11/11/2020 that she drank 2-3 large glasses of chardonnay nightly)    She is using tylenol 2 tab daily for pain due to recent parotid biopsy. Plan:  -encouraged patient to abstain from alcohol  -will get liver ultrasound  -repeat liver enzymes in 1 month. Patient states understanding and agrees with plan.       Orders Placed This Encounter    US ABD LTD     Standing Status:   Future     Standing Expiration Date:   1/18/2022     Order Specific Question:   Specific Body Part     Answer:   liver    HEPATIC FUNCTION PANEL     Standing Status:   Future     Standing Expiration Date:   12/18/2021    GGT     Standing Status:   Future     Standing Expiration Date:   12/18/2021

## 2021-01-05 ENCOUNTER — HOSPITAL ENCOUNTER (OUTPATIENT)
Dept: ULTRASOUND IMAGING | Age: 66
Discharge: HOME | End: 2021-01-05
Payer: COMMERCIAL

## 2021-01-05 ENCOUNTER — PATIENT OUTREACH (OUTPATIENT)
Dept: CASE MANAGEMENT | Age: 66
End: 2021-01-05

## 2021-01-05 DIAGNOSIS — R74.8 ELEVATED LIVER ENZYMES: ICD-10-CM

## 2021-01-05 PROCEDURE — 76705 ECHO EXAM OF ABDOMEN: CPT | Performed by: INTERNAL MEDICINE

## 2021-01-08 NOTE — PROGRESS NOTES
Patient has graduated from the Transitions of Care Coordination  program on 1/8/2021. Patient/family has the ability to self-manage at this time Care management goals have been completed. Patient was not referred to the [facility] team for further management. Goals Addressed                 This Visit's Progress     COMPLETED: ACP        12/4/2020  -No ACP on file. CTN to offer education next call. Regina DONOVAN       COMPLETED: Attends follow-up appointments as directed. 12/15/2020  -Patient reports she was seen by ENT and currently has surgery planned in January. Has decided she would like a 2nd opinion. Plans on seeing a ENT named Dr. Fernandez [place]  -Will follow up with Neurology on 1/11/21  -reports she followed up with PCP. -CTN to follow up in 2 weeks  SP  12/4/2020  -Patient to call PCP for follow up   -patient to call ENT for follow up  -declines CTN offer to coordinate scheduling. -CTN to follow up in 1 week.  COMPLETED: Prevent complications post hospitalization. 12/15/2020  -Patient addressed with ENT. Biopdy site healed.  -Patient addressed Anxiety and Atarax with PCP. Was given a RX for lorazepam and Zoloft. -CTN to follow up in 2 weeks  12/4/2020  -reports that the right side of her face is more swollen this AM.Numbness is unchanged. Will call ENT office this morning to discuss with Dr. Dulce Maria Flor. Denies fever, redness, heat or drainage to biopsy sight.  -Treated for Anxiety in the ED. Given a Rx for Atarax. Patient reports that it \" put her right to sleep. \" Will discuss with PCP at follow up per patient. -CTN to follow up in 1 week  Reji Johnson            Patient has Care Transition Nurse's contact information for any further questions, concerns, or needs.   Patients upcoming visits:    Future Appointments   Date Time Provider Colette Camacho   1/25/2021 12:40 PM MD SHONNA Umanzor BS AMB   3/11/2021  3:20 PM Sangha, Rudy Collet, MD NEUSM BS AMB

## 2021-01-25 ENCOUNTER — OFFICE VISIT (OUTPATIENT)
Dept: INTERNAL MEDICINE CLINIC | Age: 66
End: 2021-01-25
Payer: COMMERCIAL

## 2021-01-25 VITALS
RESPIRATION RATE: 16 BRPM | HEIGHT: 67 IN | SYSTOLIC BLOOD PRESSURE: 130 MMHG | WEIGHT: 191.8 LBS | BODY MASS INDEX: 30.1 KG/M2 | TEMPERATURE: 98.2 F | HEART RATE: 88 BPM | OXYGEN SATURATION: 97 % | DIASTOLIC BLOOD PRESSURE: 80 MMHG

## 2021-01-25 DIAGNOSIS — K11.1 PAROTID GLAND ENLARGEMENT: ICD-10-CM

## 2021-01-25 DIAGNOSIS — F41.9 ANXIETY: ICD-10-CM

## 2021-01-25 DIAGNOSIS — R74.8 ELEVATED LIVER ENZYMES: ICD-10-CM

## 2021-01-25 DIAGNOSIS — Z78.9 ALCOHOL USE: ICD-10-CM

## 2021-01-25 DIAGNOSIS — Z00.00 ANNUAL PHYSICAL EXAM: Primary | ICD-10-CM

## 2021-01-25 DIAGNOSIS — K76.0 HEPATIC STEATOSIS: ICD-10-CM

## 2021-01-25 LAB
ALBUMIN SERPL-MCNC: 4 G/DL (ref 3.5–5)
ALBUMIN/GLOB SERPL: 1.3 {RATIO} (ref 1.1–2.2)
ALP SERPL-CCNC: 87 U/L (ref 45–117)
ALT SERPL-CCNC: 35 U/L (ref 12–78)
ANION GAP SERPL CALC-SCNC: 4 MMOL/L (ref 5–15)
AST SERPL-CCNC: 20 U/L (ref 15–37)
BILIRUB SERPL-MCNC: 0.3 MG/DL (ref 0.2–1)
BUN SERPL-MCNC: 11 MG/DL (ref 6–20)
BUN/CREAT SERPL: 17 (ref 12–20)
CALCIUM SERPL-MCNC: 9.6 MG/DL (ref 8.5–10.1)
CHLORIDE SERPL-SCNC: 104 MMOL/L (ref 97–108)
CO2 SERPL-SCNC: 29 MMOL/L (ref 21–32)
CREAT SERPL-MCNC: 0.65 MG/DL (ref 0.55–1.02)
GGT SERPL-CCNC: 98 U/L (ref 5–55)
GLOBULIN SER CALC-MCNC: 3.2 G/DL (ref 2–4)
GLUCOSE SERPL-MCNC: 104 MG/DL (ref 65–100)
POTASSIUM SERPL-SCNC: 3.6 MMOL/L (ref 3.5–5.1)
PROT SERPL-MCNC: 7.2 G/DL (ref 6.4–8.2)
SODIUM SERPL-SCNC: 137 MMOL/L (ref 136–145)

## 2021-01-25 PROCEDURE — 99396 PREV VISIT EST AGE 40-64: CPT | Performed by: INTERNAL MEDICINE

## 2021-01-25 PROCEDURE — 99213 OFFICE O/P EST LOW 20 MIN: CPT | Performed by: INTERNAL MEDICINE

## 2021-01-25 NOTE — PATIENT INSTRUCTIONS
[address] [phone]    [facility] [address] [phone]                                                 [place] [address] [phone] 96 End [phone] [facility] - [phone]    [facility] - [phone]

## 2021-01-25 NOTE — PROGRESS NOTES
Subjective:      Kaushal Bhatia is a 63 y.o. female who presents today for her annual physical and follow up of her anxiety, elevated liver enzymes. Gyn care is provided by Dr. Melina Whitaker. - last visit was 2017  Screening mammogram was done at 11/25/20 at [facility]  Screening colonoscopy was last completed with Dr. Natalee Fischer, 1/2020. She was started zoloft 25mg daily on 12/8/20 with her visit with Nick Muñoz NP for anxiety. She read the side effects and decided not to use this medication. She states that her stress and anxiety are from her job. She is thinking about retiring. She is getting a second opinion for her enlarged parotid gland, -- ENT - Dr. Cano July, tomorrow. Her liver enzymes are elevated. Liver ultrasound show hepatic steatosis. She reports that she is drinking only 2 glasses of wine per week now, vs the daily glass that she was using. Reduced use only about 1 weeks ago. Consultants:  -Dr. Candice Paul - ENT    Due for:  -pap exam.     Current Outpatient Medications   Medication Sig Dispense Refill    B infantis/B ani/B anastasiya/B bifid (PROBIOTIC 4X PO) Take  by mouth.  TURMERIC PO Take  by mouth.  azelastine (ASTELIN) 137 mcg (0.1 %) nasal spray INSTILL 1 SPRAY BY BOTH NOSTRILS ROUTE TWO (2) TIMES A DAY. 1 Bottle 1    fluticasone propionate (FLONASE) 50 mcg/actuation nasal spray 2 Sprays by Both Nostrils route daily. 1 Bottle 0    cholecalciferol (VITAMIN D3) 1,000 unit tablet Take 1,000 Units by mouth daily.  VITAMIN B COMPLEX (B COMPLEX 1 PO) Take 1 Cap by mouth daily. Review of Systems    A comprehensive review of systems was negative except for that written in the HPI. Objective:      Wt Readings from Last 3 Encounters:   12/08/20 191 lb 3.2 oz (86.7 kg)   12/03/20 190 lb (86.2 kg)   12/02/20 190 lb 0.6 oz (86.2 kg)     BP Readings from Last 3 Encounters:   12/08/20 (!) 144/92   12/03/20 135/85   11/24/20 104/80     Visit Vitals  /80   Pulse 88   Temp 98.2 °F (36.8 °C) (Temporal)   Resp 16   Ht 5' 7\" (1.702 m)   Wt 191 lb 12.8 oz (87 kg)   LMP 10/28/2002 (Exact Date) Comment: age 54   SpO2 97%   BMI 30.04 kg/m²     General appearance: alert, cooperative, no distress, appears stated age  Head: Normocephalic, without obvious abnormality, atraumatic  Ears: normal TM's and external ear canals AU  Neck: supple, symmetrical, trachea midline, no adenopathy, no carotid bruit and no JVD  Lungs: clear to auscultation bilaterally  Breasts: normal appearance, no masses or tenderness  Abdomen: soft, non-tender. Bowel sounds normal. No masses,  no organomegaly  Extremities: extremities normal, atraumatic, no cyanosis or edema  Pulses: 2+ and symmetric  Neurologic: Grossly normal    Assessment/Plan:     1. Annual physical exam  -fasting labs done 12/3/20    Also, she has additional complaints of the following --.     2. Elevated liver enzymes  -patient states that she has decreased her alcohol use, but has not stopped alcohol use. - METABOLIC PANEL, COMPREHENSIVE; Future  - GGT; Future  - GGT  - METABOLIC PANEL, COMPREHENSIVE    3. Hepatic steatosis    - METABOLIC PANEL, COMPREHENSIVE; Future  - GGT; Future  - GGT  - METABOLIC PANEL, COMPREHENSIVE    4. Alcohol use  -encouraged her to continue to lower her use of alcohol    - METABOLIC PANEL, COMPREHENSIVE; Future  - GGT; Future  - GGT  - METABOLIC PANEL, COMPREHENSIVE    5. Anxiety  -patient refuses use of medications.  -I recommended counseling to help her cope with her work anxiety. She agreed    6.  Parotid gland enlargement  -getting second opinion with Dr. Rubens Garcia tomorrow     Orders Placed This Encounter    METABOLIC PANEL, COMPREHENSIVE     Standing Status:   Future     Number of Occurrences:   1     Standing Expiration Date:   1/25/2022    GGT     Standing Status:   Future     Number of Occurrences:   1     Standing Expiration Date:   1/25/2022    B infantis/B ani/B anastasiya/B bifid (PROBIOTIC 4X PO)     Sig: Take  by mouth.         Follow-up Disposition:     Need follow up for gyn exam.     Return if symptoms worsen or fail to improve. Advised patient to call back or return to office if symptoms worsen/change/persist.     Discussed expected course/resolution/complications of diagnosis in detail with patient. Medication risks/benefits/costs/interactions/alternatives discussed with patient. Patient was given an after visit summary which includes diagnoses, current medications, & vitals. Patient expressed understanding with the diagnosis and plan.

## 2021-01-27 NOTE — PROGRESS NOTES
Liver enzymes AST and ALT improved. GGT still elevated. Continue to reduce alcohol intake.  Pt notified via [employer] 1/27/2021

## 2021-04-09 ENCOUNTER — HOSPITAL ENCOUNTER (OUTPATIENT)
Dept: PREADMISSION TESTING | Age: 66
Discharge: HOME | End: 2021-04-09
Payer: COMMERCIAL

## 2021-04-09 VITALS
SYSTOLIC BLOOD PRESSURE: 148 MMHG | HEIGHT: 67 IN | BODY MASS INDEX: 29.93 KG/M2 | RESPIRATION RATE: 18 BRPM | TEMPERATURE: 97.9 F | HEART RATE: 91 BPM | WEIGHT: 190.7 LBS | DIASTOLIC BLOOD PRESSURE: 95 MMHG

## 2021-04-09 LAB
ALBUMIN SERPL-MCNC: 4 G/DL (ref 3.5–5)
ALBUMIN/GLOB SERPL: 1.2 {RATIO} (ref 1.1–2.2)
ALP SERPL-CCNC: 87 U/L (ref 45–117)
ALT SERPL-CCNC: 35 U/L (ref 12–78)
ANION GAP SERPL CALC-SCNC: 5 MMOL/L (ref 5–15)
AST SERPL-CCNC: 16 U/L (ref 15–37)
BASOPHILS # BLD: 0.1 K/UL (ref 0–0.1)
BASOPHILS NFR BLD: 1 % (ref 0–1)
BILIRUB SERPL-MCNC: 0.4 MG/DL (ref 0.2–1)
BUN SERPL-MCNC: 13 MG/DL (ref 6–20)
BUN/CREAT SERPL: 21 (ref 12–20)
CALCIUM SERPL-MCNC: 9.3 MG/DL (ref 8.5–10.1)
CHLORIDE SERPL-SCNC: 104 MMOL/L (ref 97–108)
CO2 SERPL-SCNC: 28 MMOL/L (ref 21–32)
CREAT SERPL-MCNC: 0.61 MG/DL (ref 0.55–1.02)
DIFFERENTIAL METHOD BLD: NORMAL
EOSINOPHIL # BLD: 0.2 K/UL (ref 0–0.4)
EOSINOPHIL NFR BLD: 2 % (ref 0–7)
ERYTHROCYTE [DISTWIDTH] IN BLOOD BY AUTOMATED COUNT: 11.9 % (ref 11.5–14.5)
GLOBULIN SER CALC-MCNC: 3.3 G/DL (ref 2–4)
GLUCOSE SERPL-MCNC: 102 MG/DL (ref 65–100)
HCT VFR BLD AUTO: 44.1 % (ref 35–47)
HGB BLD-MCNC: 14.9 G/DL (ref 11.5–16)
IMM GRANULOCYTES # BLD AUTO: 0 K/UL (ref 0–0.04)
IMM GRANULOCYTES NFR BLD AUTO: 0 % (ref 0–0.5)
LYMPHOCYTES # BLD: 2.5 K/UL (ref 0.8–3.5)
LYMPHOCYTES NFR BLD: 32 % (ref 12–49)
MCH RBC QN AUTO: 32.7 PG (ref 26–34)
MCHC RBC AUTO-ENTMCNC: 33.8 G/DL (ref 30–36.5)
MCV RBC AUTO: 96.9 FL (ref 80–99)
MONOCYTES # BLD: 0.7 K/UL (ref 0–1)
MONOCYTES NFR BLD: 9 % (ref 5–13)
NEUTS SEG # BLD: 4.2 K/UL (ref 1.8–8)
NEUTS SEG NFR BLD: 56 % (ref 32–75)
NRBC # BLD: 0 K/UL (ref 0–0.01)
NRBC BLD-RTO: 0 PER 100 WBC
PLATELET # BLD AUTO: 284 K/UL (ref 150–400)
PMV BLD AUTO: 9.3 FL (ref 8.9–12.9)
POTASSIUM SERPL-SCNC: 3.7 MMOL/L (ref 3.5–5.1)
PROT SERPL-MCNC: 7.3 G/DL (ref 6.4–8.2)
RBC # BLD AUTO: 4.55 M/UL (ref 3.8–5.2)
SODIUM SERPL-SCNC: 137 MMOL/L (ref 136–145)
WBC # BLD AUTO: 7.6 K/UL (ref 3.6–11)

## 2021-04-09 PROCEDURE — 85025 COMPLETE CBC W/AUTO DIFF WBC: CPT

## 2021-04-09 PROCEDURE — 80053 COMPREHEN METABOLIC PANEL: CPT

## 2021-04-09 PROCEDURE — 36415 COLL VENOUS BLD VENIPUNCTURE: CPT

## 2021-04-09 NOTE — PERIOP NOTES
Preoperative and medication instructions reviewed with patient , surgical site infection sheet given,  chg soap x 2 given and instructions on how to use chg soap correctly. Patient given opportunity to ask questions and all questions were answered.  Information given regarding covid testing and arrival to hospital on day of surgery

## 2021-04-15 ENCOUNTER — TRANSCRIBE ORDER (OUTPATIENT)
Dept: REGISTRATION | Age: 66
End: 2021-04-15

## 2021-04-15 ENCOUNTER — HOSPITAL ENCOUNTER (OUTPATIENT)
Dept: PREADMISSION TESTING | Age: 66
Discharge: HOME | End: 2021-04-15
Payer: COMMERCIAL

## 2021-04-15 DIAGNOSIS — Z01.812 PRE-PROCEDURE LAB EXAM: Primary | ICD-10-CM

## 2021-04-15 DIAGNOSIS — Z01.812 PRE-PROCEDURE LAB EXAM: ICD-10-CM

## 2021-04-15 PROCEDURE — U0003 INFECTIOUS AGENT DETECTION BY NUCLEIC ACID (DNA OR RNA); SEVERE ACUTE RESPIRATORY SYNDROME CORONAVIRUS 2 (SARS-COV-2) (CORONAVIRUS DISEASE [COVID-19]), AMPLIFIED PROBE TECHNIQUE, MAKING USE OF HIGH THROUGHPUT TECHNOLOGIES AS DESCRIBED BY CMS-2020-01-R: HCPCS

## 2021-04-16 LAB — SARS-COV-2, COV2NT: NOT DETECTED

## 2021-04-19 ENCOUNTER — ANESTHESIA EVENT (OUTPATIENT)
Dept: SURGERY | Age: 66
End: 2021-04-19
Payer: COMMERCIAL

## 2021-04-19 ENCOUNTER — ANESTHESIA (OUTPATIENT)
Dept: SURGERY | Age: 66
End: 2021-04-19
Payer: COMMERCIAL

## 2021-04-19 ENCOUNTER — HOSPITAL ENCOUNTER (OUTPATIENT)
Age: 66
Setting detail: OBSERVATION
Discharge: HOME | End: 2021-04-20
Attending: OTOLARYNGOLOGY | Admitting: OTOLARYNGOLOGY
Payer: COMMERCIAL

## 2021-04-19 DIAGNOSIS — K11.8 MASS OF RIGHT PAROTID GLAND: Primary | ICD-10-CM

## 2021-04-19 PROBLEM — D49.0 TUMOR OF PAROTID GLAND: Status: ACTIVE | Noted: 2021-04-19

## 2021-04-19 PROCEDURE — 74011000250 HC RX REV CODE- 250: Performed by: NURSE ANESTHETIST, CERTIFIED REGISTERED

## 2021-04-19 PROCEDURE — 74011000258 HC RX REV CODE- 258: Performed by: NURSE ANESTHETIST, CERTIFIED REGISTERED

## 2021-04-19 PROCEDURE — 74011250637 HC RX REV CODE- 250/637: Performed by: ANESTHESIOLOGY

## 2021-04-19 PROCEDURE — 77030026438 HC STYL ET INTUB CARD -A: Performed by: NURSE ANESTHETIST, CERTIFIED REGISTERED

## 2021-04-19 PROCEDURE — 88307 TISSUE EXAM BY PATHOLOGIST: CPT

## 2021-04-19 PROCEDURE — 74011000250 HC RX REV CODE- 250: Performed by: OTOLARYNGOLOGY

## 2021-04-19 PROCEDURE — 77030008462 HC STPLR SKN PROX J&J -A: Performed by: OTOLARYNGOLOGY

## 2021-04-19 PROCEDURE — 74011250636 HC RX REV CODE- 250/636: Performed by: OTOLARYNGOLOGY

## 2021-04-19 PROCEDURE — 74011250636 HC RX REV CODE- 250/636

## 2021-04-19 PROCEDURE — 77030002974 HC SUT PLN J&J -A: Performed by: OTOLARYNGOLOGY

## 2021-04-19 PROCEDURE — 99218 HC RM OBSERVATION: CPT

## 2021-04-19 PROCEDURE — 76210000001 HC OR PH I REC 2.5 TO 3 HR: Performed by: OTOLARYNGOLOGY

## 2021-04-19 PROCEDURE — 77030040356 HC CORD BPLR FRCP COVD -A: Performed by: OTOLARYNGOLOGY

## 2021-04-19 PROCEDURE — 74011250637 HC RX REV CODE- 250/637: Performed by: OTOLARYNGOLOGY

## 2021-04-19 PROCEDURE — 74011250636 HC RX REV CODE- 250/636: Performed by: NURSE ANESTHETIST, CERTIFIED REGISTERED

## 2021-04-19 PROCEDURE — 76010000171 HC OR TIME 2 TO 2.5 HR INTENSV-TIER 1: Performed by: OTOLARYNGOLOGY

## 2021-04-19 PROCEDURE — 77030008684 HC TU ET CUF COVD -B: Performed by: NURSE ANESTHETIST, CERTIFIED REGISTERED

## 2021-04-19 PROCEDURE — 77030040361 HC SLV COMPR DVT MDII -B: Performed by: OTOLARYNGOLOGY

## 2021-04-19 PROCEDURE — 2709999900 HC NON-CHARGEABLE SUPPLY: Performed by: OTOLARYNGOLOGY

## 2021-04-19 PROCEDURE — 77030002916 HC SUT ETHLN J&J -A: Performed by: OTOLARYNGOLOGY

## 2021-04-19 PROCEDURE — 77030031139 HC SUT VCRL2 J&J -A: Performed by: OTOLARYNGOLOGY

## 2021-04-19 PROCEDURE — 74011250636 HC RX REV CODE- 250/636: Performed by: ANESTHESIOLOGY

## 2021-04-19 PROCEDURE — 76060000035 HC ANESTHESIA 2 TO 2.5 HR: Performed by: OTOLARYNGOLOGY

## 2021-04-19 PROCEDURE — 77030019655 HC PRB STIM CRAN MEDT -B: Performed by: OTOLARYNGOLOGY

## 2021-04-19 PROCEDURE — 77030040922 HC BLNKT HYPOTHRM STRY -A

## 2021-04-19 PROCEDURE — 77030018831 HC SOL IRR H20 BAXT -A: Performed by: OTOLARYNGOLOGY

## 2021-04-19 PROCEDURE — 77030011267 HC ELECTRD BLD COVD -A: Performed by: OTOLARYNGOLOGY

## 2021-04-19 PROCEDURE — 77030002996 HC SUT SLK J&J -A: Performed by: OTOLARYNGOLOGY

## 2021-04-19 PROCEDURE — 77030038552 HC DRN WND MDII -A: Performed by: OTOLARYNGOLOGY

## 2021-04-19 RX ORDER — LEVOFLOXACIN 5 MG/ML
500 INJECTION, SOLUTION INTRAVENOUS ONCE
Status: COMPLETED | OUTPATIENT
Start: 2021-04-19 | End: 2021-04-19

## 2021-04-19 RX ORDER — SODIUM CHLORIDE 9 MG/ML
25 INJECTION, SOLUTION INTRAVENOUS CONTINUOUS
Status: DISPENSED | OUTPATIENT
Start: 2021-04-19 | End: 2021-04-20

## 2021-04-19 RX ORDER — LIDOCAINE HYDROCHLORIDE 10 MG/ML
0.1 INJECTION, SOLUTION EPIDURAL; INFILTRATION; INTRACAUDAL; PERINEURAL AS NEEDED
Status: DISCONTINUED | OUTPATIENT
Start: 2021-04-19 | End: 2021-04-19 | Stop reason: HOSPADM

## 2021-04-19 RX ORDER — BACITRACIN ZINC 500 UNIT/G
OINTMENT (GRAM) TOPICAL AS NEEDED
Status: DISCONTINUED | OUTPATIENT
Start: 2021-04-19 | End: 2021-04-19 | Stop reason: HOSPADM

## 2021-04-19 RX ORDER — HYDROMORPHONE HYDROCHLORIDE 1 MG/ML
0.2 INJECTION, SOLUTION INTRAMUSCULAR; INTRAVENOUS; SUBCUTANEOUS
Status: COMPLETED | OUTPATIENT
Start: 2021-04-19 | End: 2021-04-19

## 2021-04-19 RX ORDER — ACETAMINOPHEN 325 MG/1
650 TABLET ORAL ONCE
Status: DISCONTINUED | OUTPATIENT
Start: 2021-04-19 | End: 2021-04-19 | Stop reason: HOSPADM

## 2021-04-19 RX ORDER — SODIUM CHLORIDE 0.9 % (FLUSH) 0.9 %
5-40 SYRINGE (ML) INJECTION AS NEEDED
Status: DISCONTINUED | OUTPATIENT
Start: 2021-04-19 | End: 2021-04-19 | Stop reason: HOSPADM

## 2021-04-19 RX ORDER — SODIUM CHLORIDE 0.9 % (FLUSH) 0.9 %
5-40 SYRINGE (ML) INJECTION EVERY 8 HOURS
Status: DISCONTINUED | OUTPATIENT
Start: 2021-04-19 | End: 2021-04-19 | Stop reason: HOSPADM

## 2021-04-19 RX ORDER — DEXMEDETOMIDINE HYDROCHLORIDE 100 UG/ML
INJECTION, SOLUTION INTRAVENOUS AS NEEDED
Status: DISCONTINUED | OUTPATIENT
Start: 2021-04-19 | End: 2021-04-19 | Stop reason: HOSPADM

## 2021-04-19 RX ORDER — ONDANSETRON 2 MG/ML
INJECTION INTRAMUSCULAR; INTRAVENOUS AS NEEDED
Status: DISCONTINUED | OUTPATIENT
Start: 2021-04-19 | End: 2021-04-19 | Stop reason: HOSPADM

## 2021-04-19 RX ORDER — ACETAMINOPHEN 325 MG/1
650 TABLET ORAL ONCE
Status: COMPLETED | OUTPATIENT
Start: 2021-04-19 | End: 2021-04-19

## 2021-04-19 RX ORDER — DIPHENHYDRAMINE HYDROCHLORIDE 50 MG/ML
12.5 INJECTION, SOLUTION INTRAMUSCULAR; INTRAVENOUS AS NEEDED
Status: DISCONTINUED | OUTPATIENT
Start: 2021-04-19 | End: 2021-04-19

## 2021-04-19 RX ORDER — MULTIVIT WITH MINERALS/HERBS
1 TABLET ORAL DAILY
Status: DISCONTINUED | OUTPATIENT
Start: 2021-04-20 | End: 2021-04-20 | Stop reason: HOSPADM

## 2021-04-19 RX ORDER — SODIUM CHLORIDE, SODIUM LACTATE, POTASSIUM CHLORIDE, CALCIUM CHLORIDE 600; 310; 30; 20 MG/100ML; MG/100ML; MG/100ML; MG/100ML
125 INJECTION, SOLUTION INTRAVENOUS CONTINUOUS
Status: DISPENSED | OUTPATIENT
Start: 2021-04-19 | End: 2021-04-20

## 2021-04-19 RX ORDER — FENTANYL CITRATE 50 UG/ML
50 INJECTION, SOLUTION INTRAMUSCULAR; INTRAVENOUS AS NEEDED
Status: DISCONTINUED | OUTPATIENT
Start: 2021-04-19 | End: 2021-04-19 | Stop reason: HOSPADM

## 2021-04-19 RX ORDER — LIDOCAINE HYDROCHLORIDE 20 MG/ML
INJECTION, SOLUTION EPIDURAL; INFILTRATION; INTRACAUDAL; PERINEURAL AS NEEDED
Status: DISCONTINUED | OUTPATIENT
Start: 2021-04-19 | End: 2021-04-19 | Stop reason: HOSPADM

## 2021-04-19 RX ORDER — FENTANYL CITRATE 50 UG/ML
INJECTION, SOLUTION INTRAMUSCULAR; INTRAVENOUS AS NEEDED
Status: DISCONTINUED | OUTPATIENT
Start: 2021-04-19 | End: 2021-04-19 | Stop reason: HOSPADM

## 2021-04-19 RX ORDER — OXYCODONE AND ACETAMINOPHEN 5; 325 MG/1; MG/1
2 TABLET ORAL
Qty: 40 TAB | Refills: 0 | Status: SHIPPED | OUTPATIENT
Start: 2021-04-19 | End: 2021-04-26

## 2021-04-19 RX ORDER — MIDAZOLAM HYDROCHLORIDE 1 MG/ML
INJECTION, SOLUTION INTRAMUSCULAR; INTRAVENOUS AS NEEDED
Status: DISCONTINUED | OUTPATIENT
Start: 2021-04-19 | End: 2021-04-19 | Stop reason: HOSPADM

## 2021-04-19 RX ORDER — ONDANSETRON 2 MG/ML
4 INJECTION INTRAMUSCULAR; INTRAVENOUS
Status: DISCONTINUED | OUTPATIENT
Start: 2021-04-19 | End: 2021-04-20 | Stop reason: HOSPADM

## 2021-04-19 RX ORDER — SODIUM CHLORIDE, SODIUM LACTATE, POTASSIUM CHLORIDE, CALCIUM CHLORIDE 600; 310; 30; 20 MG/100ML; MG/100ML; MG/100ML; MG/100ML
INJECTION, SOLUTION INTRAVENOUS
Status: DISCONTINUED | OUTPATIENT
Start: 2021-04-19 | End: 2021-04-19 | Stop reason: HOSPADM

## 2021-04-19 RX ORDER — SODIUM CHLORIDE, SODIUM LACTATE, POTASSIUM CHLORIDE, CALCIUM CHLORIDE 600; 310; 30; 20 MG/100ML; MG/100ML; MG/100ML; MG/100ML
125 INJECTION, SOLUTION INTRAVENOUS CONTINUOUS
Status: DISCONTINUED | OUTPATIENT
Start: 2021-04-19 | End: 2021-04-19

## 2021-04-19 RX ORDER — HYDROMORPHONE HYDROCHLORIDE 1 MG/ML
0.2 INJECTION, SOLUTION INTRAMUSCULAR; INTRAVENOUS; SUBCUTANEOUS
Status: DISCONTINUED | OUTPATIENT
Start: 2021-04-19 | End: 2021-04-19

## 2021-04-19 RX ORDER — MORPHINE SULFATE 2 MG/ML
2 INJECTION, SOLUTION INTRAMUSCULAR; INTRAVENOUS
Status: DISCONTINUED | OUTPATIENT
Start: 2021-04-19 | End: 2021-04-20 | Stop reason: HOSPADM

## 2021-04-19 RX ORDER — MIDAZOLAM HYDROCHLORIDE 1 MG/ML
0.5 INJECTION, SOLUTION INTRAMUSCULAR; INTRAVENOUS
Status: DISCONTINUED | OUTPATIENT
Start: 2021-04-19 | End: 2021-04-19

## 2021-04-19 RX ORDER — MORPHINE SULFATE 2 MG/ML
2 INJECTION, SOLUTION INTRAMUSCULAR; INTRAVENOUS
Status: DISCONTINUED | OUTPATIENT
Start: 2021-04-19 | End: 2021-04-19

## 2021-04-19 RX ORDER — OXYCODONE AND ACETAMINOPHEN 5; 325 MG/1; MG/1
1 TABLET ORAL
Status: DISCONTINUED | OUTPATIENT
Start: 2021-04-19 | End: 2021-04-20 | Stop reason: HOSPADM

## 2021-04-19 RX ORDER — FENTANYL CITRATE 50 UG/ML
INJECTION, SOLUTION INTRAMUSCULAR; INTRAVENOUS
Status: COMPLETED
Start: 2021-04-19 | End: 2021-04-19

## 2021-04-19 RX ORDER — SUCCINYLCHOLINE CHLORIDE 20 MG/ML
INJECTION INTRAMUSCULAR; INTRAVENOUS AS NEEDED
Status: DISCONTINUED | OUTPATIENT
Start: 2021-04-19 | End: 2021-04-19 | Stop reason: HOSPADM

## 2021-04-19 RX ORDER — OXYCODONE AND ACETAMINOPHEN 5; 325 MG/1; MG/1
1 TABLET ORAL AS NEEDED
Status: DISCONTINUED | OUTPATIENT
Start: 2021-04-19 | End: 2021-04-19

## 2021-04-19 RX ORDER — MELATONIN
1000 DAILY
Status: DISCONTINUED | OUTPATIENT
Start: 2021-04-20 | End: 2021-04-20 | Stop reason: HOSPADM

## 2021-04-19 RX ORDER — PHENYLEPHRINE HCL IN 0.9% NACL 0.4MG/10ML
SYRINGE (ML) INTRAVENOUS AS NEEDED
Status: DISCONTINUED | OUTPATIENT
Start: 2021-04-19 | End: 2021-04-19 | Stop reason: HOSPADM

## 2021-04-19 RX ORDER — FENTANYL CITRATE 50 UG/ML
25 INJECTION, SOLUTION INTRAMUSCULAR; INTRAVENOUS
Status: COMPLETED | OUTPATIENT
Start: 2021-04-19 | End: 2021-04-19

## 2021-04-19 RX ORDER — PROPOFOL 10 MG/ML
INJECTION, EMULSION INTRAVENOUS AS NEEDED
Status: DISCONTINUED | OUTPATIENT
Start: 2021-04-19 | End: 2021-04-19 | Stop reason: HOSPADM

## 2021-04-19 RX ORDER — SODIUM CHLORIDE 0.9 % (FLUSH) 0.9 %
5-40 SYRINGE (ML) INJECTION EVERY 8 HOURS
Status: DISCONTINUED | OUTPATIENT
Start: 2021-04-19 | End: 2021-04-19

## 2021-04-19 RX ORDER — ONDANSETRON 2 MG/ML
4 INJECTION INTRAMUSCULAR; INTRAVENOUS AS NEEDED
Status: DISCONTINUED | OUTPATIENT
Start: 2021-04-19 | End: 2021-04-19

## 2021-04-19 RX ORDER — SODIUM CHLORIDE 0.9 % (FLUSH) 0.9 %
5-40 SYRINGE (ML) INJECTION EVERY 8 HOURS
Status: DISCONTINUED | OUTPATIENT
Start: 2021-04-19 | End: 2021-04-20 | Stop reason: HOSPADM

## 2021-04-19 RX ORDER — FENTANYL CITRATE 50 UG/ML
25 INJECTION, SOLUTION INTRAMUSCULAR; INTRAVENOUS
Status: DISCONTINUED | OUTPATIENT
Start: 2021-04-19 | End: 2021-04-19

## 2021-04-19 RX ORDER — PROPOFOL 10 MG/ML
INJECTION, EMULSION INTRAVENOUS
Status: DISCONTINUED | OUTPATIENT
Start: 2021-04-19 | End: 2021-04-19 | Stop reason: HOSPADM

## 2021-04-19 RX ORDER — EPHEDRINE SULFATE/0.9% NACL/PF 50 MG/5 ML
SYRINGE (ML) INTRAVENOUS AS NEEDED
Status: DISCONTINUED | OUTPATIENT
Start: 2021-04-19 | End: 2021-04-19 | Stop reason: HOSPADM

## 2021-04-19 RX ORDER — MIDAZOLAM HYDROCHLORIDE 1 MG/ML
1 INJECTION, SOLUTION INTRAMUSCULAR; INTRAVENOUS AS NEEDED
Status: DISCONTINUED | OUTPATIENT
Start: 2021-04-19 | End: 2021-04-19 | Stop reason: HOSPADM

## 2021-04-19 RX ORDER — DEXAMETHASONE SODIUM PHOSPHATE 4 MG/ML
INJECTION, SOLUTION INTRA-ARTICULAR; INTRALESIONAL; INTRAMUSCULAR; INTRAVENOUS; SOFT TISSUE AS NEEDED
Status: DISCONTINUED | OUTPATIENT
Start: 2021-04-19 | End: 2021-04-19 | Stop reason: HOSPADM

## 2021-04-19 RX ORDER — SODIUM CHLORIDE 0.9 % (FLUSH) 0.9 %
5-40 SYRINGE (ML) INJECTION AS NEEDED
Status: DISCONTINUED | OUTPATIENT
Start: 2021-04-19 | End: 2021-04-19

## 2021-04-19 RX ORDER — SODIUM CHLORIDE 9 MG/ML
25 INJECTION, SOLUTION INTRAVENOUS CONTINUOUS
Status: DISCONTINUED | OUTPATIENT
Start: 2021-04-19 | End: 2021-04-19 | Stop reason: HOSPADM

## 2021-04-19 RX ORDER — HYDROMORPHONE HYDROCHLORIDE 2 MG/ML
INJECTION, SOLUTION INTRAMUSCULAR; INTRAVENOUS; SUBCUTANEOUS AS NEEDED
Status: DISCONTINUED | OUTPATIENT
Start: 2021-04-19 | End: 2021-04-19 | Stop reason: HOSPADM

## 2021-04-19 RX ORDER — FLUTICASONE PROPIONATE 50 MCG
2 SPRAY, SUSPENSION (ML) NASAL DAILY
Status: DISCONTINUED | OUTPATIENT
Start: 2021-04-20 | End: 2021-04-20 | Stop reason: HOSPADM

## 2021-04-19 RX ORDER — SODIUM CHLORIDE 0.9 % (FLUSH) 0.9 %
5-40 SYRINGE (ML) INJECTION AS NEEDED
Status: DISCONTINUED | OUTPATIENT
Start: 2021-04-19 | End: 2021-04-20 | Stop reason: HOSPADM

## 2021-04-19 RX ORDER — AZELASTINE 1 MG/ML
2 SPRAY, METERED NASAL 2 TIMES DAILY
Status: DISCONTINUED | OUTPATIENT
Start: 2021-04-19 | End: 2021-04-20 | Stop reason: HOSPADM

## 2021-04-19 RX ORDER — SODIUM CHLORIDE, SODIUM LACTATE, POTASSIUM CHLORIDE, CALCIUM CHLORIDE 600; 310; 30; 20 MG/100ML; MG/100ML; MG/100ML; MG/100ML
125 INJECTION, SOLUTION INTRAVENOUS CONTINUOUS
Status: DISCONTINUED | OUTPATIENT
Start: 2021-04-19 | End: 2021-04-19 | Stop reason: HOSPADM

## 2021-04-19 RX ORDER — LIDOCAINE HYDROCHLORIDE AND EPINEPHRINE 10; 10 MG/ML; UG/ML
INJECTION, SOLUTION INFILTRATION; PERINEURAL AS NEEDED
Status: DISCONTINUED | OUTPATIENT
Start: 2021-04-19 | End: 2021-04-19 | Stop reason: HOSPADM

## 2021-04-19 RX ORDER — ROCURONIUM BROMIDE 10 MG/ML
INJECTION, SOLUTION INTRAVENOUS AS NEEDED
Status: DISCONTINUED | OUTPATIENT
Start: 2021-04-19 | End: 2021-04-19 | Stop reason: HOSPADM

## 2021-04-19 RX ADMIN — Medication 200 MCG: at 13:23

## 2021-04-19 RX ADMIN — Medication 120 MCG: at 13:14

## 2021-04-19 RX ADMIN — DEXMEDETOMIDINE HYDROCHLORIDE 8 MCG: 100 INJECTION, SOLUTION, CONCENTRATE INTRAVENOUS at 13:58

## 2021-04-19 RX ADMIN — FENTANYL CITRATE 50 MCG: 50 INJECTION, SOLUTION INTRAMUSCULAR; INTRAVENOUS at 12:53

## 2021-04-19 RX ADMIN — SODIUM CHLORIDE, POTASSIUM CHLORIDE, SODIUM LACTATE AND CALCIUM CHLORIDE: 600; 310; 30; 20 INJECTION, SOLUTION INTRAVENOUS at 15:01

## 2021-04-19 RX ADMIN — MIDAZOLAM 2 MG: 1 INJECTION INTRAMUSCULAR; INTRAVENOUS at 12:50

## 2021-04-19 RX ADMIN — FENTANYL CITRATE 25 MCG: 50 INJECTION INTRAMUSCULAR; INTRAVENOUS at 15:30

## 2021-04-19 RX ADMIN — FENTANYL CITRATE 25 MCG: 50 INJECTION INTRAMUSCULAR; INTRAVENOUS at 15:25

## 2021-04-19 RX ADMIN — LIDOCAINE HYDROCHLORIDE 100 MG: 20 INJECTION, SOLUTION EPIDURAL; INFILTRATION; INTRACAUDAL; PERINEURAL at 12:53

## 2021-04-19 RX ADMIN — LEVOFLOXACIN 500 MG: 5 INJECTION, SOLUTION INTRAVENOUS at 13:03

## 2021-04-19 RX ADMIN — FENTANYL CITRATE 25 MCG: 50 INJECTION INTRAMUSCULAR; INTRAVENOUS at 15:15

## 2021-04-19 RX ADMIN — SODIUM CHLORIDE, POTASSIUM CHLORIDE, SODIUM LACTATE AND CALCIUM CHLORIDE 125 ML/HR: 600; 310; 30; 20 INJECTION, SOLUTION INTRAVENOUS at 21:04

## 2021-04-19 RX ADMIN — Medication 80 MCG: at 13:06

## 2021-04-19 RX ADMIN — Medication 200 MCG: at 13:26

## 2021-04-19 RX ADMIN — ACETAMINOPHEN 650 MG: 325 TABLET, FILM COATED ORAL at 11:36

## 2021-04-19 RX ADMIN — PROPOFOL 150 MG: 10 INJECTION, EMULSION INTRAVENOUS at 12:54

## 2021-04-19 RX ADMIN — Medication 80 MCG: at 13:19

## 2021-04-19 RX ADMIN — HYDROMORPHONE HYDROCHLORIDE 0.2 MG: 1 INJECTION, SOLUTION INTRAMUSCULAR; INTRAVENOUS; SUBCUTANEOUS at 16:20

## 2021-04-19 RX ADMIN — HYDROMORPHONE HYDROCHLORIDE 0.5 MG: 2 INJECTION, SOLUTION INTRAMUSCULAR; INTRAVENOUS; SUBCUTANEOUS at 14:05

## 2021-04-19 RX ADMIN — ROCURONIUM BROMIDE 5 MG: 10 SOLUTION INTRAVENOUS at 12:53

## 2021-04-19 RX ADMIN — ONDANSETRON HYDROCHLORIDE 4 MG: 2 INJECTION, SOLUTION INTRAMUSCULAR; INTRAVENOUS at 14:45

## 2021-04-19 RX ADMIN — SODIUM CHLORIDE, POTASSIUM CHLORIDE, SODIUM LACTATE AND CALCIUM CHLORIDE: 600; 310; 30; 20 INJECTION, SOLUTION INTRAVENOUS at 12:50

## 2021-04-19 RX ADMIN — FENTANYL CITRATE 50 MCG: 50 INJECTION, SOLUTION INTRAMUSCULAR; INTRAVENOUS at 13:04

## 2021-04-19 RX ADMIN — Medication 80 MCG: at 13:12

## 2021-04-19 RX ADMIN — FENTANYL CITRATE 25 MCG: 50 INJECTION INTRAMUSCULAR; INTRAVENOUS at 15:20

## 2021-04-19 RX ADMIN — HYDROMORPHONE HYDROCHLORIDE 0.2 MG: 1 INJECTION, SOLUTION INTRAMUSCULAR; INTRAVENOUS; SUBCUTANEOUS at 16:35

## 2021-04-19 RX ADMIN — SODIUM CHLORIDE, POTASSIUM CHLORIDE, SODIUM LACTATE AND CALCIUM CHLORIDE 125 ML/HR: 600; 310; 30; 20 INJECTION, SOLUTION INTRAVENOUS at 11:32

## 2021-04-19 RX ADMIN — Medication 10 MG: at 14:20

## 2021-04-19 RX ADMIN — DEXAMETHASONE SODIUM PHOSPHATE 4 MG: 4 INJECTION, SOLUTION INTRAMUSCULAR; INTRAVENOUS at 13:06

## 2021-04-19 RX ADMIN — OXYCODONE HYDROCHLORIDE AND ACETAMINOPHEN 1 TABLET: 5; 325 TABLET ORAL at 21:06

## 2021-04-19 RX ADMIN — PHENYLEPHRINE HYDROCHLORIDE 50 MCG/MIN: 10 INJECTION INTRAVENOUS at 13:27

## 2021-04-19 RX ADMIN — PROPOFOL 50 MG: 10 INJECTION, EMULSION INTRAVENOUS at 13:09

## 2021-04-19 RX ADMIN — PROPOFOL 50 MCG/KG/MIN: 10 INJECTION, EMULSION INTRAVENOUS at 13:00

## 2021-04-19 RX ADMIN — SUCCINYLCHOLINE CHLORIDE 160 MG: 20 INJECTION, SOLUTION INTRAMUSCULAR; INTRAVENOUS at 12:55

## 2021-04-19 NOTE — ANESTHESIA PREPROCEDURE EVALUATION
Relevant Problems   No relevant active problems       Anesthetic History   No history of anesthetic complications            Review of Systems / Medical History  Patient summary reviewed, nursing notes reviewed and pertinent labs reviewed    Pulmonary  Within defined limits                 Neuro/Psych         TIA     Cardiovascular  Within defined limits                     GI/Hepatic/Renal     GERD           Endo/Other  Within defined limits           Other Findings              Physical Exam    Airway  Mallampati: II  TM Distance: > 6 cm  Neck ROM: normal range of motion   Mouth opening: Normal     Cardiovascular  Regular rate and rhythm,  S1 and S2 normal,  no murmur, click, rub, or gallop             Dental  No notable dental hx       Pulmonary  Breath sounds clear to auscultation               Abdominal  GI exam deferred       Other Findings            Anesthetic Plan    ASA: 2  Anesthesia type: general          Induction: Intravenous  Anesthetic plan and risks discussed with: Patient

## 2021-04-19 NOTE — PERIOP NOTES
CALLED HER  TO LET HIM KNOW WHAT ROOM SHE IS GOING TO AND LET HIM KNOW A FEW THINGS SHE WAS REQUESTING HE BRING IN PER PATIENT.

## 2021-04-19 NOTE — PERIOP NOTES
Patient: Stella Park MRN: [id number]  SSN: [id number]   YOB: 1955  Age: 63 y.o. Sex: female     Patient is status post Procedure(s):  PAROTID GLAND EXCISION WITH FACIAL NERVE MONITORING AND STIMULATION. Surgeon(s) and Role:     Jane Ibrahim MD - Primary    Local/Dose/Irrigation:  11.5 ml 1% Lido w/epi injected to neck.                  Peripheral IV 04/19/21 Left Wrist (Active)   Site Assessment Clean, dry, & intact 04/19/21 1131   Phlebitis Assessment 0 04/19/21 1131   Infiltration Assessment 0 04/19/21 1131   Dressing Status Clean, dry, & intact 04/19/21 1131   Dressing Type Transparent 04/19/21 1131   Hub Color/Line Status Infusing 04/19/21 1131   Action Taken Other (comment) 04/19/21 1131   Alcohol Cap Used Yes 04/19/21 1131                       Dressing/Packing:  Incision 04/19/21 Neck Right-Dressing/Treatment: Open to air (04/19/21 1446)

## 2021-04-19 NOTE — ANESTHESIA POSTPROCEDURE EVALUATION
Procedure(s):  PAROTID GLAND EXCISION WITH FACIAL NERVE MONITORING AND STIMULATION. general    Anesthesia Post Evaluation        Patient participation: complete - patient participated  Level of consciousness: awake  Pain management: adequate  Airway patency: patent  Anesthetic complications: no  Cardiovascular status: hemodynamically stable  Respiratory status: acceptable  Hydration status: acceptable  Comments: The patient is ready for PACU discharge. Karon Kc DO                   Post anesthesia nausea and vomiting:  controlled      INITIAL Post-op Vital signs:   Vitals Value Taken Time   /76 04/19/21 1715   Temp 36.8 °C (98.3 °F) 04/19/21 1520   Pulse 78 04/19/21 1719   Resp 17 04/19/21 1719   SpO2 96 % 04/19/21 1719   Vitals shown include unvalidated device data.

## 2021-04-19 NOTE — PERIOP NOTES
TRANSFER - OUT REPORT:    Verbal report given to Jc Perez RN(name) on Caleb Sicard  being transferred to (unit) for routine post - op       Report consisted of patients Situation, Background, Assessment and   Recommendations(SBAR). Time Pre op antibiotic given:N  Anesthesia Stop time: N  Lopez Present on Transfer to floor:N  Order for Lopez on Chart:N  Discharge Prescriptions with Chart:N    Information from the following report(s) SBAR was reviewed with the receiving nurse. Opportunity for questions and clarification was provided. Is the patient on 02? NO       L/Min        Other     Is the patient on a monitor? NO    Is the nurse transporting with the patient? NO    Surgical Waiting Area notified of patient's transfer from PACU? YES      The following personal items collected during your admission accompanied patient upon transfer:   Dental Appliance: Dental Appliances: None  Vision:    Hearing Aid:    Jewelry: Jewelry: None  Clothing: Clothing: (to PACU)  Other Valuables:  Other Valuables: Eyeglasses(to PACU)  Valuables sent to safe:

## 2021-04-19 NOTE — DISCHARGE INSTRUCTIONS
Patient Education        Parotidectomy: What to Expect at Home  Your Recovery  Parotidectomy (say \"umg-orct-oj-DEK-tuh-ren\") is the removal of the parotid glands, located below the ears. They make saliva, which enters the mouth through a tube (duct) near the back teeth. Most tumors that grow in the parotid glands are benign, which means they aren't cancer. You may leave the hospital with stitches in the cut (incision) the doctor made. Your doctor will tell you if you need to come back to have these removed. You may still have a tube called a drain in your neck. Your doctor will take this out a few days after your surgery. You may have some trouble chewing and swallowing for several days after you go home. You may have numbness in your earlobe and weakness in your face. For most people, these problems get better within 3 to 4 months. But it can take as long as a year. In some cases, problems with facial movement are permanent. This care sheet gives you a general idea about how long it will take for you to recover. But each person recovers at a different pace. Follow the steps below to get better as quickly as possible. How can you care for yourself at home? Activity    · Rest when you feel tired. Getting enough sleep will help you recover. When you lie down, raise your head with two or three pillows.     · Try to walk each day. Start by walking a little more than you did the day before. Bit by bit, increase the amount you walk. Walking boosts blood flow and helps prevent pneumonia and constipation.     · Avoid strenuous physical activity and lifting heavy objects for 3 weeks after surgery or until your doctor says it is okay.     · Do not over-extend your neck backwards for 2 weeks after surgery.     · Ask your doctor when you can drive again.     · You may take a shower, unless you still have a drain near your incision. Pat the incision dry. If you have a drain, follow your doctor's instructions to care for it. Diet    · If it is painful to swallow, start out with cold drinks, Popsicles, and ice cream. Next, try soft foods like pudding, yogurt, canned or cooked fruit, scrambled eggs, and mashed potatoes. Avoid eating hard or scratchy foods like chips or raw vegetables. Avoid orange or tomato juice and other acidic foods that can sting the throat.     · If you cough right after drinking, try drinking thicker liquids, such as a smoothie.     · You may notice that your bowel movements are not regular right after your surgery. This is common. Try to avoid constipation and straining with bowel movements. You may want to take a fiber supplement every day. If you have not had a bowel movement after a couple of days, ask your doctor about taking a mild laxative. Medicines    · Your doctor will tell you if and when you can restart your medicines. He or she will also give you instructions about taking any new medicines.     · If you take aspirin or some other blood thinner, ask your doctor if and when to start taking it again. Make sure that you understand exactly what your doctor wants you to do.     · Be safe with medicines. Take pain medicines exactly as directed. ? If the doctor gave you a prescription medicine for pain, take it as prescribed. ? If you are not taking a prescription pain medicine, ask your doctor if you can take an over-the-counter medicine.     · If you think your pain medicine is making you sick to your stomach:  ? Take your medicine after meals (unless your doctor has told you not to). ? Ask your doctor for a different pain medicine.     · Your doctor may prescribe calcium to prevent problems after surgery from low calcium. Not having enough calcium can cause symptoms such as tingling around your mouth or in your hands and feet.     · Your doctor may have prescribed antibiotics. Take them as directed. Do not stop taking them just because you feel better. You need to take the full course of antibiotics. Incision care    · If your doctor told you how to care for your incision, follow your doctor's instructions. If you did not get instructions, follow this general advice:  ? After the first 24 to 48 hours, wash around the incision with clean water 2 times a day. Don't use hydrogen peroxide or alcohol, which can slow healing.     · You may have a drain near your incision. Your doctor will tell you how to take care of it. Follow-up care is a key part of your treatment and safety. Be sure to make and go to all appointments, and call your doctor if you are having problems. It's also a good idea to know your test results and keep a list of the medicines you take. When should you call for help? Call 911 anytime you think you may need emergency care. For example, call if:    · You passed out (lost consciousness).     · You have sudden chest pain and shortness of breath, or you cough up blood.     · You have trouble breathing. Call your doctor now or seek immediate medical care if:    · You have loose stitches, or your incision comes open.     · Bleeding from your incision soaks through your bandages.     · You have signs of infection, such as:  ? Increased pain, swelling, warmth, or redness. ? Red streaks leading from the incision. ? Pus draining from the incision. ? A fever.     · You have trouble swallowing. Watch closely for changes in your health, and be sure to contact your doctor if:    · You do not get better as expected. Where can you learn more? Go to http://www.gray.com/  Enter U990 in the search box to learn more about \"Parotidectomy: What to Expect at Home. \"  Current as of: December 2, 2020               Content Version: 12.8  © 7439-5885 [employer]. Care instructions adapted under license by [employer] (which disclaims liability or warranty for this information).  If you have questions about a medical condition or this instruction, always ask your healthcare professional. [address] any warranty or liability for your use of this information. Patient Education   ______________________________________________________________________    Anesthesia Discharge Instructions    After general anesthesia or intervenous sedation, for 24 hours or while taking prescription Narcotics:  · Limit your activities  · Do not drive or operate hazardous machinery  · If you have not urinated within 8 hours after discharge, please contact your surgeon on call. · Do not make important personal or business decisions  · Do not drink alcoholic beverages    Report the following to your surgeon:  · Excessive pain, swelling, redness or odor of or around the surgical area  · Temperature over 100.5 degrees  · Nausea and vomiting lasting longer than 4 hours or if unable to take medication  · Any signs of decreased circulation or nerve impairment to extremity:  Change in color, persistent numbness, tingling, coldness or increased pain. · Any questions         Surgical Drain Care: Care Instructions  Your Care Instructions     After a surgery, fluid may collect inside your body in the surgical area. This makes an infection or other problems more likely. A surgical drain allows the fluid to flow out. The doctor puts a thin, flexible rubber tube into the area of your body where the fluid is likely to collect. The rubber tube carries the fluid outside your body. The most common type of surgical drain carries the fluid into a collection bulb that you empty. This is called a Erickson-Buck (ALEJO) drain. The drain uses suction created by the bulb to pull the fluid from your body into the bulb. The rubber tube will probably be held in place by one or two stitches in your skin. The bulb will probably be attached with a safety pin to your clothes or near the bandage so that it doesn't flip around or pull on the stitches. Another type of drain is called a Penrose drain. This type of drain doesn't have a bulb. Instead, the end of the tube is open. That allows the fluid to drain onto a dressing taped to your skin. The drain may be kept in place next to your skin with a stitch or a safety pin in the tube. When you first get the drain, the fluid will be bloody. It will change color from red to pink to a light yellow or clear as the wound heals and the fluid starts to go away. Your doctor may give you information on when you no longer need the drain and when it will be removed. Follow-up care is a key part of your treatment and safety. Be sure to make and go to all appointments, and call your doctor if you are having problems. It's also a good idea to know your test results and keep a list of the medicines you take. How do you empty the bulb of a Erickson-Buck drain? Follow any instructions your doctor gives you. How often you empty the bulb depends on how much fluid is draining. Empty the bulb when it is half full. 2. Wash your hands with soap and water. 3. Take the plug out of the bulb. 4. Empty the bulb. If your doctor asks you to measure the fluid, empty the fluid into a measuring cup, and write down the color and how much you collected. Your doctor will want to know this information. 5. Clean the plug with alcohol. 6. Squeeze the bulb until it is flat. This removes all the air from the bulb. You may need to put the bulb on a table or a counter to flatten it. 7. Keep the bulb flat, and put the plug in. The bulb should stay flat after you put the plug back in. This creates the suction that pulls the fluid into the bulb. 8. Empty the fluid into the toilet. 9. Wash your hands. How do you change the dressing around your surgical drain? You may have a dressing (bandage). The dressing is often made of gauze pads held on with tape. Your doctor will tell you how often to change it. 2. Wash your hands with soap and water.   3. Take off the dressing from around the drain.  4. Clean the drain site and the skin around it with soap and water. Use gauze or a cotton swab. 5. When the site is dry, put on a new dressing. The way your dressing is put on depends on what kind of drain you have. You will get instructions for your type of drain. 6. Wash your hands again with soap and water. Your doctor may ask you to keep track of your dressing changes. Write down the time of day and the amount and color of the fluid on the dressing. How do you help prevent clogs in your surgical drain? Squeezing or \"milking\" the tube of your surgical drain can help prevent clogs so that it drains correctly. Your doctor will tell you when you need to do this. In general, you do this when:  · You see a clot in the tube that prevents fluid from draining. The clot may look like a dark, stringy lining. · You see fluid leaking around the tube where it goes into the skin. Follow these steps for milking the tube. 1. Use one hand to hold and pinch the tube where it leaves the skin. 2. With the thumb and first finger of your other hand, pinch the tube just below where you're holding it. 3. Slowly and firmly push your thumb and first finger down the tubing toward the end of the tube. 4. Repeat this as many times as needed to move the clot. If you have a Erickson-Buck (ALEJO) drain, the clot should move down the tube and into the bulb. If you have a Penrose drain, the clot should move into the dressing. When should you call for help? Call your doctor now or seek immediate medical care if:    · You have signs of infection, such as:  ? Increased pain, swelling, warmth, or redness around the area. ? Red streaks leading from the area. ? Pus draining from the area. ? A fever.     · You see a sudden change in the color or smell of the drainage.     · The tube is coming loose where it leaves your skin.    Watch closely for changes in your health, and be sure to contact your doctor if:    · You see a lot of fluid around the drain.     · You cannot remove a clot from the tube by milking the tube. Where can you learn more? Go to http://www.gray.com/  Enter K117 in the search box to learn more about \"Surgical Drain Care: Care Instructions. \"  Current as of: February 26, 2020               Content Version: 12.8  © 2006-2021 [employer]. Care instructions adapted under license by [employer] (which disclaims liability or warranty for this information). If you have questions about a medical condition or this instruction, always ask your healthcare professional. [place] [address] any warranty or liability for your use of this information.

## 2021-04-19 NOTE — BRIEF OP NOTE
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       Procedure(s):  PAROTID GLAND EXCISION WITH FACIAL NERVE MONITORING AND STIMULATION    Surgeon(s):  Ariel Frye MD    Surgical Assistant: Surg Asst-1: [place]    Anesthesia: General     Estimated Blood Loss (mL): less than 50     Complications: None    Specimens:   ID Type Source Tests Collected by Time Destination   1 : Right Parotid Gland Tumor Fresh Neck  Ariel Frye MD 4/19/2021 1423 Pathology        Implants: * No implants in log *    Drains: * No LDAs found *    Findings: 5cm tumor    Electronically Signed by Rabia Kendall MD on 4/19/2021 at 5:11 PM

## 2021-04-20 VITALS
HEIGHT: 67 IN | DIASTOLIC BLOOD PRESSURE: 78 MMHG | HEART RATE: 75 BPM | SYSTOLIC BLOOD PRESSURE: 131 MMHG | WEIGHT: 190 LBS | TEMPERATURE: 97.8 F | OXYGEN SATURATION: 98 % | RESPIRATION RATE: 18 BRPM | BODY MASS INDEX: 29.82 KG/M2

## 2021-04-20 PROCEDURE — 74011250637 HC RX REV CODE- 250/637: Performed by: OTOLARYNGOLOGY

## 2021-04-20 PROCEDURE — 94760 N-INVAS EAR/PLS OXIMETRY 1: CPT

## 2021-04-20 PROCEDURE — 99218 HC RM OBSERVATION: CPT

## 2021-04-20 RX ORDER — ZOLPIDEM TARTRATE 5 MG/1
5 TABLET ORAL ONCE
Status: COMPLETED | OUTPATIENT
Start: 2021-04-20 | End: 2021-04-20

## 2021-04-20 RX ORDER — ZOLPIDEM TARTRATE 5 MG/1
5 TABLET ORAL
Status: DISCONTINUED | OUTPATIENT
Start: 2021-04-20 | End: 2021-04-20

## 2021-04-20 RX ADMIN — ZOLPIDEM TARTRATE 5 MG: 5 TABLET ORAL at 00:47

## 2021-04-20 RX ADMIN — OXYCODONE HYDROCHLORIDE AND ACETAMINOPHEN 1 TABLET: 5; 325 TABLET ORAL at 13:17

## 2021-04-20 RX ADMIN — OXYCODONE HYDROCHLORIDE AND ACETAMINOPHEN 1 TABLET: 5; 325 TABLET ORAL at 05:23

## 2021-04-20 RX ADMIN — Medication 10 ML: at 05:25

## 2021-04-20 NOTE — PROGRESS NOTES
Reviewed discharge instructions with patient allowed for questions and clarification. patient is waiting for  to .

## 2021-04-20 NOTE — PROGRESS NOTES
I called and talked with Dr. Pawan Mar assistance. Per his assistance in his office. Dr. Nancy Calix will be calling after he is out of surgery, which will be approximally 12:30.

## 2021-04-20 NOTE — PROGRESS NOTES
Observation notice provided in writing to patient and/or caregiver as well as verbal explanation of the policy. Patients who are in outpatient status also receive the Observation notice.       Lashell Gross RN/CRM  [phone]

## 2021-04-20 NOTE — PROGRESS NOTES
Primary Nurse Alix Gimenez RN and Leigh Bowers RN performed a dual skin assessment on this patient Impairment noted- see wound doc flow sheet  Hardy score is 23

## 2021-04-20 NOTE — PROGRESS NOTES
Problem: Falls - Risk of  Goal: *Absence of Falls  Description: Document Sharad Thomas Fall Risk and appropriate interventions in the flowsheet.   Outcome: Progressing Towards Goal  Note: Fall Risk Interventions:                                Problem: Patient Education: Go to Patient Education Activity  Goal: Patient/Family Education  Outcome: Progressing Towards Goal     Problem: Breathing Pattern - Ineffective  Goal: *Absence of hypoxia  Outcome: Progressing Towards Goal  Goal: *Use of effective breathing techniques  Outcome: Progressing Towards Goal  Goal: *PALLIATIVE CARE:  Alleviation of Dyspnea  Outcome: Progressing Towards Goal     Problem: Patient Education: Go to Patient Education Activity  Goal: Patient/Family Education  Outcome: Progressing Towards Goal

## 2021-04-21 ENCOUNTER — PATIENT OUTREACH (OUTPATIENT)
Dept: CASE MANAGEMENT | Age: 66
End: 2021-04-21

## 2021-04-21 NOTE — PROGRESS NOTES
Care Transitions Initial Follow Up Call    Call within 2 business days of discharge: Yes     Patient: Fabiola Fothergill Patient : 1955 MRN: [id number]    Last Discharge [address]       Complaint Diagnosis Description Type Department Provider    21  Mass of right parotid gland Admission (Discharged) Kaden Hadley MD          Was this an external facility discharge? No     Challenges to be reviewed by the provider   Additional needs identified to be addressed with provider no  none         Method of communication with provider : chart routing    Discussed COVID-19 related testing which was not done at this time. Test results were not done. Advance Care Planning:   Does patient have an Advance Directive: patient declined education     Inpatient Readmission Risk score: No data recorded  Was this a readmission? no   Patient stated reason for the admission: removal of parotid tumor    Patients top risk factors for readmission: medical condition-s/p parotidectomy no known risk factors  Interventions to address risk factors: Scheduled appointment with PCP-patient plans to call pcp after nap, Scheduled appointment with Specialist-reminded patient to call and schedule f/u with  and Obtained and reviewed discharge summary and/or continuity of care documents    Care Transition Nurse (CTN) contacted the patient by telephone to perform post hospital discharge assessment. Verified name and  with patient as identifiers. Provided introduction to self, and explanation of the CTN role. Reports she had a rough night. Pain better now, about #3. Took one Percocet tab about 45 minutes prior to our call. Does not have a drain, felt better after had a shower this am.  No trouble with chewing or swallowing. Ear is numb as expected. Sleepy from the medication for pain, planned to take nap and then call ENT for f/u appt.      CTN reviewed discharge instructions, medical action plan and red flags with patient who verbalized understanding. Were discharge instructions available to patient? yes. Reviewed appropriate site of care based on symptoms and resources available to patient including: PCP, Specialist, Urgent Care Clinics and When to call 911. Patient given an opportunity to ask questions and does not have any further questions or concerns at this time. The patient agrees to contact the PCP office for questions related to their healthcare. Medication reconciliation was performed with patient, who verbalizes understanding of administration of home medications. Advised obtaining a 90-day supply of all daily and as-needed medications. Referral to Pharm D needed: no     Home Health/Outpatient orders at discharge: [address]: na  Date of initial visit: na    Durable Medical Equipment ordered at discharge: none  [facility] [address] received: na    Covid Risk Education    Patient has following risk factors of: no known risk factors. Education provided regarding infection prevention, and signs and symptoms of COVID-19 and when to seek medical attention with patient who verbalized understanding. Discussed exposure protocols and quarantine From CDC: Are you at higher risk for severe illness?  and given an opportunity for questions and concerns. The patient agrees to contact the COVID-19 hotline 143-921-5904 or PCP office for questions related to COVID-19. For more information on steps you can take to protect yourself, see CDC's How to Protect Yourself     Was patient discharged with a pulse oximeter? no Discussed and confirmed pulse oximeter discharge instructions and when to notify provider or seek emergency care. Discussed follow-up appointments. If no appointment was previously scheduled, appointment scheduling offered: yes Is follow up appointment scheduled within 7 days of discharge? no -patient to schedule f/u with .  Offered to assist with pcp f/u- she declined. [facility] follow up appointment(s): ,pcp, patient will call herself-declined my assistance. Non-[facility] follow up appointment(s): Soren Barnes- patient to call and schedule. Plan for follow-up call in 7-10 days based on severity of symptoms and risk factors. Plan for next call: symptom management-assess current symptoms, self management-following discharge instructions, follow up appointment-saw ENT for f/u and pcp for VIRGIE and medication management-taking meds as ordered. CTN provided contact information for future needs. Goals Addressed                 This Visit's Progress     Prevent complications post hospitalization. 4/21/21 [facility] 4/19-4/20 Right parotid gland mass- s/p parotidectomy   Reviewed discharge instructions with patient   Reviewed meds- education provided on new medication.  Reviewed red flags: fever, nausea,vomiting,diarrhea, increased pain not managed by pain med, signs of infection at incision site-redness/swelling/drainage.  Reminded to schedule f/u with .  Offered to assist with scheduling VIRGIE with pcp, she declined. She will call.  Declined info on [employer] as resource.  Given CTN contact info if any questions/concerns.    Advised CTN to check back in about a week, sooner [email or web address]

## 2021-04-23 ENCOUNTER — TELEPHONE (OUTPATIENT)
Dept: INTERNAL MEDICINE CLINIC | Age: 66
End: 2021-04-23

## 2021-04-23 NOTE — TELEPHONE ENCOUNTER
PT asked to let Dr. Sunitha Steward know:    Had a warthin tumor removed.  Doing well, follow up is with Dr Candy Fonseca on Monday 26th

## 2021-04-29 ENCOUNTER — PATIENT OUTREACH (OUTPATIENT)
Dept: CASE MANAGEMENT | Age: 66
End: 2021-04-29

## 2021-04-29 NOTE — PROGRESS NOTES
Called and spoke to patient. Goals      Prevent complications post hospitalization. 4/21/21 [facility] 4/19-4/20 Right parotid gland mass- s/p parotidectomy   Reviewed discharge instructions with patient   Reviewed meds- education provided on new medication.  Reviewed red flags: fever, nausea,vomiting,diarrhea, increased pain not managed by pain med, signs of infection at incision site-redness/swelling/drainage.  Reminded to schedule f/u with .  Offered to assist with scheduling VIRGIE with pcp, she declined. She will call.  Declined info on [employer] as resource.  Given CTN contact info if any questions/concerns.  Advised CTN to check back in about a week, sooner [email or web address]  4/29/21  Reports she is doing very well. Returned to work on Tuesday of this week. Got her Covid vaccine #1 yesterday. ENT= Monday. Takes Tylenol prn discomfort. No red flags noted. CTN to check back in about a week. robert

## 2021-05-01 ENCOUNTER — HOSPITAL ENCOUNTER (EMERGENCY)
Age: 66
Discharge: HOME | End: 2021-05-01
Attending: EMERGENCY MEDICINE
Payer: COMMERCIAL

## 2021-05-01 ENCOUNTER — APPOINTMENT (OUTPATIENT)
Dept: CT IMAGING | Age: 66
End: 2021-05-01
Attending: PHYSICIAN ASSISTANT
Payer: COMMERCIAL

## 2021-05-01 VITALS
TEMPERATURE: 98.3 F | OXYGEN SATURATION: 98 % | RESPIRATION RATE: 16 BRPM | HEART RATE: 87 BPM | DIASTOLIC BLOOD PRESSURE: 84 MMHG | SYSTOLIC BLOOD PRESSURE: 164 MMHG

## 2021-05-01 DIAGNOSIS — G89.18 POST-OP PAIN: Primary | ICD-10-CM

## 2021-05-01 DIAGNOSIS — L03.811 CELLULITIS OF HEAD EXCEPT FACE: ICD-10-CM

## 2021-05-01 LAB
ALBUMIN SERPL-MCNC: 3.7 G/DL (ref 3.5–5)
ALBUMIN/GLOB SERPL: 1 {RATIO} (ref 1.1–2.2)
ALP SERPL-CCNC: 91 U/L (ref 45–117)
ALT SERPL-CCNC: 44 U/L (ref 12–78)
ANION GAP SERPL CALC-SCNC: 5 MMOL/L (ref 5–15)
AST SERPL-CCNC: 18 U/L (ref 15–37)
BASOPHILS # BLD: 0.1 K/UL (ref 0–0.1)
BASOPHILS NFR BLD: 1 % (ref 0–1)
BILIRUB SERPL-MCNC: 0.3 MG/DL (ref 0.2–1)
BUN SERPL-MCNC: 12 MG/DL (ref 6–20)
BUN/CREAT SERPL: 18 (ref 12–20)
CALCIUM SERPL-MCNC: 9.2 MG/DL (ref 8.5–10.1)
CHLORIDE SERPL-SCNC: 104 MMOL/L (ref 97–108)
CO2 SERPL-SCNC: 26 MMOL/L (ref 21–32)
COMMENT, HOLDF: NORMAL
CREAT SERPL-MCNC: 0.67 MG/DL (ref 0.55–1.02)
DIFFERENTIAL METHOD BLD: ABNORMAL
EOSINOPHIL # BLD: 0.3 K/UL (ref 0–0.4)
EOSINOPHIL NFR BLD: 3 % (ref 0–7)
ERYTHROCYTE [DISTWIDTH] IN BLOOD BY AUTOMATED COUNT: 11.7 % (ref 11.5–14.5)
GLOBULIN SER CALC-MCNC: 3.7 G/DL (ref 2–4)
GLUCOSE SERPL-MCNC: 102 MG/DL (ref 65–100)
HCT VFR BLD AUTO: 42.5 % (ref 35–47)
HGB BLD-MCNC: 14.6 G/DL (ref 11.5–16)
IMM GRANULOCYTES # BLD AUTO: 0 K/UL (ref 0–0.04)
IMM GRANULOCYTES NFR BLD AUTO: 0 % (ref 0–0.5)
LACTATE SERPL-SCNC: 1.3 MMOL/L (ref 0.4–2)
LYMPHOCYTES # BLD: 2.5 K/UL (ref 0.8–3.5)
LYMPHOCYTES NFR BLD: 31 % (ref 12–49)
MCH RBC QN AUTO: 32.4 PG (ref 26–34)
MCHC RBC AUTO-ENTMCNC: 34.4 G/DL (ref 30–36.5)
MCV RBC AUTO: 94.2 FL (ref 80–99)
MONOCYTES # BLD: 0.6 K/UL (ref 0–1)
MONOCYTES NFR BLD: 7 % (ref 5–13)
NEUTS SEG # BLD: 4.6 K/UL (ref 1.8–8)
NEUTS SEG NFR BLD: 58 % (ref 32–75)
NRBC # BLD: 0 K/UL (ref 0–0.01)
NRBC BLD-RTO: 0 PER 100 WBC
PLATELET # BLD AUTO: 305 K/UL (ref 150–400)
PMV BLD AUTO: 8.6 FL (ref 8.9–12.9)
POTASSIUM SERPL-SCNC: 3.4 MMOL/L (ref 3.5–5.1)
PROT SERPL-MCNC: 7.4 G/DL (ref 6.4–8.2)
RBC # BLD AUTO: 4.51 M/UL (ref 3.8–5.2)
SAMPLES BEING HELD,HOLD: NORMAL
SODIUM SERPL-SCNC: 135 MMOL/L (ref 136–145)
WBC # BLD AUTO: 8 K/UL (ref 3.6–11)

## 2021-05-01 PROCEDURE — 80053 COMPREHEN METABOLIC PANEL: CPT

## 2021-05-01 PROCEDURE — 99282 EMERGENCY DEPT VISIT SF MDM: CPT

## 2021-05-01 PROCEDURE — 74011000636 HC RX REV CODE- 636: Performed by: RADIOLOGY

## 2021-05-01 PROCEDURE — 85025 COMPLETE CBC W/AUTO DIFF WBC: CPT

## 2021-05-01 PROCEDURE — 83605 ASSAY OF LACTIC ACID: CPT

## 2021-05-01 PROCEDURE — 36415 COLL VENOUS BLD VENIPUNCTURE: CPT

## 2021-05-01 PROCEDURE — 70491 CT SOFT TISSUE NECK W/DYE: CPT

## 2021-05-01 RX ORDER — MUPIROCIN CALCIUM 20 MG/G
CREAM TOPICAL 2 TIMES DAILY
Qty: 15 G | Refills: 0 | Status: SHIPPED | OUTPATIENT
Start: 2021-05-01

## 2021-05-01 RX ORDER — CEFDINIR 300 MG/1
300 CAPSULE ORAL 2 TIMES DAILY
Qty: 14 CAP | Refills: 0 | Status: SHIPPED | OUTPATIENT
Start: 2021-05-01 | End: 2021-05-08

## 2021-05-01 RX ADMIN — IOPAMIDOL 100 ML: 612 INJECTION, SOLUTION INTRAVENOUS at 19:08

## 2021-05-01 NOTE — ED PROVIDER NOTES
Mackenzie Leroy is a 63 y.o. female with recent parotid gland tumor removal on , who presents ambulatory to ED with cc of redness, swelling to surgical site with onset on 3 days ago. States she started taking keflex and started 500 mg yesterday. Has taken 3 doses thus far. Notes after starting this, it started with drainage, notes she intermittently has a \"funny taste\" in her mouth. Endorses subjective F/C, feels intermittent neck pain and reports stiffness turning her neck to R side however notes good ROM neck otherwise. Called on call physician today who recommended hot compresses and when that did not improve, to come to ED for evaluation. Denies difficulty swallowing. Had tumor removed from parotid gland on 2021. Last tylenol at 11:00    ENT Surgeon: Dr Josiah Doss    Pt denies additional symptoms of cough, congestion, rhinorrhea, CP, SOB, abdominal pain, N/V/D, constipation, HA, lightheadedness, dizziness, visual changes, dysuria, urinary frequency    PMHx: fibrocystic breast, anxiety, vertigo, kidney stones, TIA, GERD, arthritis    PCP: Giselle Pantoja MD    There are no other complaints verbalized at this time. Past Medical History:   Diagnosis Date    Anxiety     Arthritis     OSTEO-KNEES    Dermatitis     atypical    Fibrocystic breast     GERD (gastroesophageal reflux disease)     Hemorrhoid     History of kidney stones     Last times was circa . 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          Family History:   Problem Relation Age of Onset    Arthritis-osteo Mother     Cancer Father         lung -heavy smoker    Cancer Maternal Aunt         breast    Breast Cancer Maternal Aunt     Breast Cancer Maternal Aunt     Anesth Problems Neg Hx        Social History     Socioeconomic History    Marital status:      Spouse name: Not on file    Number of children: Not on file    Years of education: Not on file    Highest education level: Not on file   Occupational History    Not on file   Social Needs    Financial resource strain: Not on file    Food insecurity     Worry: Not on file     Inability: Not on file    Transportation needs     Medical: Not on file     Non-medical: Not on file   Tobacco Use    Smoking status: Current Every Day Smoker     Packs/day: 0.50     Years: 40.00     Pack years: 20.00     Types: Cigarettes    Smokeless tobacco: Never Used   Substance and Sexual Activity    Alcohol use: Yes     Alcohol/week: 3.3 standard drinks     Types: 4 Glasses of wine per week    Drug use: No    Sexual activity: Yes     Partners: Male   Lifestyle    Physical activity     Days per week: Not on file     Minutes per session: Not on file    Stress: Not on file   Relationships    Social connections     Talks on phone: Not on file     Gets together: Not on file     Attends Jainism service: Not on file     Active member of club or organization: Not on file     Attends meetings of clubs or organizations: Not on file     Relationship status: Not on file    Intimate partner violence     Fear of current or ex partner: Not on file     Emotionally abused: Not on file     Physically abused: Not on file     Forced sexual activity: Not on file   Other Topics Concern    Not on file   Social History Narrative    Not on file         ALLERGIES: Sulfa (sulfonamide antibiotics), Clindamycin, Keflex [cephalexin], and Pcn [penicillins]    Review of Systems   Constitutional: Positive for chills and fever. HENT: Negative for congestion, rhinorrhea and trouble swallowing.     Eyes: Negative for visual disturbance. Respiratory: Negative for cough and shortness of breath. Cardiovascular: Negative for chest pain. Gastrointestinal: Negative for abdominal pain, constipation, diarrhea, nausea and vomiting. Genitourinary: Negative for dysuria and frequency. Musculoskeletal: Positive for neck pain and neck stiffness. Skin: Positive for color change and wound. Neurological: Negative for dizziness, light-headedness and headaches. All other systems reviewed and are negative. Vitals:    05/01/21 1638   BP: (!) 164/84   Pulse: 87   Resp: 16   Temp: 98.3 °F (36.8 °C)   SpO2: 98%            Physical Exam  Vitals signs and nursing note reviewed. Constitutional:       General: She is not in acute distress. Appearance: Normal appearance. She is not ill-appearing, toxic-appearing or diaphoretic. HENT:      Head: Atraumatic. Eyes:      Conjunctiva/sclera: Conjunctivae normal.   Neck:      Musculoskeletal: Neck supple. Decreased range of motion (somewhat decreased ROM turning R lateral, full active ROM to L as well as flexion and extension). Muscular tenderness (mild by surgical site, no further ttp) present. No spinous process tenderness. Comments: Region of erythema, mild swelling and tenderness with point region of clear, non-purulent drainage centrally. No ttp inside of mouth or cheek. R TM obstructed by wax. No other noted abnormalities to R auricular exam.  Cardiovascular:      Rate and Rhythm: Normal rate and regular rhythm. Heart sounds: Normal heart sounds. No murmur. No friction rub. No gallop. Pulmonary:      Effort: No respiratory distress. Breath sounds: Normal breath sounds. No stridor. No wheezing, rhonchi or rales. Abdominal:      General: Bowel sounds are normal. There is no distension. Palpations: Abdomen is soft. Tenderness: There is no abdominal tenderness. There is no guarding or rebound. Hernia: No hernia is present. Musculoskeletal:         General: No swelling or deformity. Skin:     General: Skin is warm and dry. Neurological:      Mental Status: She is alert and oriented to person, place, and time. Mental status is at baseline. Gait: Gait normal.                  MDM  Number of Diagnoses or Management Options     Amount and/or Complexity of Data Reviewed  Clinical lab tests: ordered and reviewed  Tests in the radiology section of CPT®: ordered and reviewed  Discuss the patient with other providers: yes (Dr Piper Kapoor, ED attending)           Procedures        CONSULT NOTE:   8:10 PM  Doug Vega PA-C spoke with Dr Nazario Vargas,   Specialty: ENT  Discussed pt's hx, disposition, and available diagnostic and imaging results. Reviewed care plans. Reviewed allergies listed and noted that patient has been tolerating Keflex well without symptoms. He recommends switching patient to Cefdinir and having her place Mupricen ointment over surgical site twice daily. Also recommends placing pressure dressing to region while in ED for her to take off tomorrow around the middle of the day. I have discussed recommendations with patient and she verbalizes understanding and agreement with care plan.         VITAL SIGNS:  Vitals:    05/01/21 1638   BP: (!) 164/84   Pulse: 87   Resp: 16   Temp: 98.3 °F (36.8 °C)   SpO2: 98%         LABS:  Recent Results (from the past 24 hour(s))   CBC WITH AUTOMATED DIFF    Collection Time: 05/01/21  4:54 PM   Result Value Ref Range    WBC 8.0 3.6 - 11.0 K/uL    RBC 4.51 3.80 - 5.20 M/uL    HGB 14.6 11.5 - 16.0 g/dL    HCT 42.5 35.0 - 47.0 %    MCV 94.2 80.0 - 99.0 FL    MCH 32.4 26.0 - 34.0 PG    MCHC 34.4 30.0 - 36.5 g/dL    RDW 11.7 11.5 - 14.5 %    PLATELET 089 212 - 543 K/uL    MPV 8.6 (L) 8.9 - 12.9 FL    NRBC 0.0 0  WBC    ABSOLUTE NRBC 0.00 0.00 - 0.01 K/uL    NEUTROPHILS 58 32 - 75 %    LYMPHOCYTES 31 12 - 49 %    MONOCYTES 7 5 - 13 %    EOSINOPHILS 3 0 - 7 %    BASOPHILS 1 0 - 1 % IMMATURE GRANULOCYTES 0 0.0 - 0.5 %    ABS. NEUTROPHILS 4.6 1.8 - 8.0 K/UL    ABS. LYMPHOCYTES 2.5 0.8 - 3.5 K/UL    ABS. MONOCYTES 0.6 0.0 - 1.0 K/UL    ABS. EOSINOPHILS 0.3 0.0 - 0.4 K/UL    ABS. BASOPHILS 0.1 0.0 - 0.1 K/UL    ABS. IMM. GRANS. 0.0 0.00 - 0.04 K/UL    DF AUTOMATED     METABOLIC PANEL, COMPREHENSIVE    Collection Time: 05/01/21  4:54 PM   Result Value Ref Range    Sodium 135 (L) 136 - 145 mmol/L    Potassium 3.4 (L) 3.5 - 5.1 mmol/L    Chloride 104 97 - 108 mmol/L    CO2 26 21 - 32 mmol/L    Anion gap 5 5 - 15 mmol/L    Glucose 102 (H) 65 - 100 mg/dL    BUN 12 6 - 20 MG/DL    Creatinine 0.67 0.55 - 1.02 MG/DL    BUN/Creatinine ratio 18 12 - 20      GFR est AA >60 >60 ml/min/1.73m2    GFR est non-AA >60 >60 ml/min/1.73m2    Calcium 9.2 8.5 - 10.1 MG/DL    Bilirubin, total 0.3 0.2 - 1.0 MG/DL    ALT (SGPT) 44 12 - 78 U/L    AST (SGOT) 18 15 - 37 U/L    Alk. phosphatase 91 45 - 117 U/L    Protein, total 7.4 6.4 - 8.2 g/dL    Albumin 3.7 3.5 - 5.0 g/dL    Globulin 3.7 2.0 - 4.0 g/dL    A-G Ratio 1.0 (L) 1.1 - 2.2     SAMPLES BEING HELD    Collection Time: 05/01/21  4:54 PM   Result Value Ref Range    SAMPLES BEING HELD 1BLU 1RED     COMMENT        Add-on orders for these samples will be processed based on acceptable specimen integrity and analyte stability, which may vary by analyte. LACTIC ACID    Collection Time: 05/01/21  5:35 PM   Result Value Ref Range    Lactic acid 1.3 0.4 - 2.0 MMOL/L         IMAGING:  CT NECK SOFT TISSUE W CONT   Final Result   Ill-defined peripherally enhancing heterogeneous collection the right parotid   gland measuring 3.8 cm in greatest diameter may represent postsurgical change   with superimposed infection not entirely excluded. There is no evidence for   definite residual or recurrent mass. Prominent to mildly enlarged adjacent lymph   nodes are likely reactive.                  Medications During Visit:  Medications   iopamidoL (ISOVUE 300) 61 % contrast injection 100 mL (100 mL IntraVENous Given 5/1/21 1908)         DECISION MAKING:    Mario Alberto Vigil is a 63 y.o. female who comes in as above. Presents afebrile and hemodynamically stable. Had surgery for a right parotid mass with removal on 4/19. Today presents with complaints of redness, discomfort and swelling to surgical site with onset approximately three days ago. Has been taking three doses of Keflex without improvement and notes some clear drainage from the region. She endorses some stiffness turning her head to the right this is my only noted on physical exam, however she has good arrow in with flexion extension and turning left. She is afebrile. Has mild erythema and swelling around surgical site with point central region of clear drainage. CBC, CMP and lactic obtained; encouraging results. CT neck with contrast also obtained demonstrating ill-defined peripherally enhancing heterogeneous collection in right parotid gland measuring 3.8 cm and greatest diameter, may represent postsurgical changes superimposed infection not entirely excluded. No evidence of definitive residual a recurrent mess and some prominent to mild enlarged adjacent lymph nodes likely reactive. I have spoken with the on-call ENT who recommends switching patient to Cefdinir, pressure compress be applied, and having her have close follow up in the office on Monday. Recommendations have been conveyed to patient. We have discussed close return precautions as well as follow up recommendations. Opportunity for questions presented. Pt verbalizes their understanding and agreement with care plan. IMPRESSION:  1. Post-op pain    2. Cellulitis of head except face        DISPOSITION:  Discharged      Discharge Medication List as of 5/1/2021  8:27 PM      START taking these medications    Details   cefdinir (OMNICEF) 300 mg capsule Take 1 Cap by mouth two (2) times a day for 7 days. , Normal, Disp-14 Cap, R-0      mupirocin calcium (Bactroban) 2 % topical cream Apply  to affected area two (2) times a day., Normal, Disp-15 g, R-0         CONTINUE these medications which have NOT CHANGED    Details   B infantis/B ani/B anastasiya/B bifid (PROBIOTIC 4X PO) Take  by mouth., Historical Med      TURMERIC PO Take  by mouth., Historical Med      azelastine (ASTELIN) 137 mcg (0.1 %) nasal spray INSTILL 1 SPRAY BY BOTH NOSTRILS ROUTE TWO (2) TIMES A DAY., Normal, Disp-1 Bottle,R-1      fluticasone propionate (FLONASE) 50 mcg/actuation nasal spray 2 Sprays by Both Nostrils route daily. , Normal, Disp-1 Bottle, R-0      cholecalciferol (VITAMIN D3) 1,000 unit tablet Take 1,000 Units by mouth daily. , Historical Med      VITAMIN B COMPLEX (B COMPLEX 1 PO) Take 1 Cap by mouth daily. , Historical Med              Follow-up Information     Follow up With Specialties Details Why Contact Info    Ginny Lynch MD Otolaryngology Schedule an appointment as soon as possible for a visit  Please call monday morning to be seen that same day [address] [facility] [address], [facility] Emergency [facility] Go to  As needed, If symptoms worsen, if fevers, inability to swallow, new or other concerning symptoms [address] [place] [address]  [phone]            The patient is asked to follow-up with their primary care provider and any other physicians as above in the next several days. They are to call tomorrow for an appointment. We have discussed strict return precautions and the patient is asked to return promptly for any increased concerns or worsening of symptoms and for return precautions regarding their symptoms today. They can return to this emergency department or any other emergency department. I have discussed with them results as above and presented opportunity for questions. They verbalize their understanding of the aboveand agreement with care plan.     Please note that this dictation was completed with [employer], the computer voice recognition software. Quite often unanticipated grammatical, syntax, homophones, and other interpretive errors are inadvertently transcribed by the computer software. Please disregard these errors. Please excuse any errors that have escaped final proofreading.

## 2021-05-01 NOTE — ED TRIAGE NOTES
Pt presents to department with a CC of redness, swelling, and increased clear drainage to right side of neck. Pt recently had tumor removed from parotid gland on 4/19/2021. Pt called her surgeon and started antibiotics yesterday with no symptoms improvement.

## 2021-05-02 NOTE — ED NOTES
Post-op gauze and dressing placed on pt's neck per request by ENT. Discharge instructions discussed with pt. Pt verbalized understanding. Pt ambulatory from department, gait steady.

## 2021-05-02 NOTE — DISCHARGE INSTRUCTIONS
Please follow up with your surgeon on Monday morning. Please call their office for appointment to be seen that same day. Please remove pressure bandage around mid-day tomorrow.

## 2021-05-03 NOTE — OP NOTES
[address]   OPERATIVE REPORT    Name:  Merlyn Mart  MR#:  [id number]  :  1955  ACCOUNT #:  [de-identified]  DATE OF SERVICE:  2021      PREOPERATIVE DIAGNOSIS:  Right parotid gland tumor. 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 PROCEDURES PERFORMED:  1. Right superficial parotidectomy with nerve dissection. 2.  Fasciocutaneous flap of the face, facial reconstruction, 12 x 5 cm. 3.  Nerve integrity monitoring x1 hour. SURGEON:  Paul Moore MD    ASSISTANT:  Surgical assistant. ANESTHESIA:  General endotracheal tube anesthesia. COMPLICATIONS:  No complication. SPECIMENS REMOVED:  Right parotid gland tumor. IMPLANTS:  No implant. ESTIMATED BLOOD LOSS:  10 mL. INDICATIONS AND FINDINGS:  The patient had steady growth of a right parotid gland tumor, needle biopsy suggesting Warthin's tumor with progressive growth and known neoplastic process and known tumor process, hence indications. PROCEDURE:  In the operating room, the patient was induced under general endotracheal tube anesthesia and the nerve integrity monitor was set up and registered for use. A preauricular incision was made curving around the lobe over the mastoid into the mid-neck. Inferiorly, a subplatysmal flap was elevated with Bovie cautery connecting with the sub-SMAS flap over the parotid gland in the face, these flaps retracted with the silk sutures. The parotid gland was dissected from the auricular cartilage. Emerging facial nerve branches were identified, likely cervical, marginal mandibular and maxillary. The latter were traced in a retrograde fashion leading underneath the tumor done with [facility], keeping all tissue on 3-point retraction.   The lateral gland was then dissected over its lateral body identifying the capsule of the tumor, which was then dissected in an extracapsular fashion with a Kittner, bipolar, and Diego dissector. The main trunk of the facial nerve was identified and traced both retrograde and anterograde over the deep surface of the tumor, connecting with the prior dissection. This allowed the tumor to be reflected from its nerve branches, dissecting out in its entirety in an extracapsular fashion and handing this off for pathology analysis. The facial nerve was seemed to be intact visually and tested with a nerve integrity monitor and seemed to move all segments of the face. The wound was irrigated. A mattress suture of 4-0 Vicryl was used to close the dead space of the parotid gland wound. The flap itself was rotated and advanced, closed the wound from the parotid gland dissection, insetting the flap in 2 layers using 4-0 Vicryl in the platysma of the neck and using the same to pin the SMAS of the face. A combination of running and interrupted 4-0 plain gut sutures were then placed in the skin everting the edges, closing the wound. Prior to closure, a facial drain was applied exiting the mastoid area, secured with a nylon suture. The wounds were cleaned and antibiotic ointment placed. The patient was handed over to Anesthesia for awakening and transfer to the recovery room.         MD VIRGIE [id number]  D:  05/02/2021 23:08  T:  05/03/2021 5:21  JOB #:  [id number]  CC:  [address] [place] [address], Nose, And Throat [facility]

## 2021-05-06 ENCOUNTER — HOSPITAL ENCOUNTER (EMERGENCY)
Age: 66
Discharge: HOME | End: 2021-05-06
Attending: EMERGENCY MEDICINE | Admitting: EMERGENCY MEDICINE
Payer: COMMERCIAL

## 2021-05-06 VITALS
HEART RATE: 76 BPM | HEIGHT: 67 IN | DIASTOLIC BLOOD PRESSURE: 93 MMHG | TEMPERATURE: 98.5 F | RESPIRATION RATE: 18 BRPM | SYSTOLIC BLOOD PRESSURE: 153 MMHG | BODY MASS INDEX: 29.66 KG/M2 | OXYGEN SATURATION: 97 % | WEIGHT: 189 LBS

## 2021-05-06 DIAGNOSIS — R19.7 DIARRHEA, UNSPECIFIED TYPE: Primary | ICD-10-CM

## 2021-05-06 LAB
ALBUMIN SERPL-MCNC: 3.9 G/DL (ref 3.5–5)
ALBUMIN/GLOB SERPL: 1 {RATIO} (ref 1.1–2.2)
ALP SERPL-CCNC: 98 U/L (ref 45–117)
ALT SERPL-CCNC: 48 U/L (ref 12–78)
ANION GAP SERPL CALC-SCNC: 7 MMOL/L (ref 5–15)
AST SERPL-CCNC: 22 U/L (ref 15–37)
BASOPHILS # BLD: 0.1 K/UL (ref 0–0.1)
BASOPHILS NFR BLD: 1 % (ref 0–1)
BILIRUB SERPL-MCNC: 0.3 MG/DL (ref 0.2–1)
BUN SERPL-MCNC: 9 MG/DL (ref 6–20)
BUN/CREAT SERPL: 14 (ref 12–20)
CALCIUM SERPL-MCNC: 9.1 MG/DL (ref 8.5–10.1)
CHLORIDE SERPL-SCNC: 105 MMOL/L (ref 97–108)
CO2 SERPL-SCNC: 26 MMOL/L (ref 21–32)
COMMENT, HOLDF: NORMAL
CREAT SERPL-MCNC: 0.65 MG/DL (ref 0.55–1.02)
DIFFERENTIAL METHOD BLD: NORMAL
EOSINOPHIL # BLD: 0.2 K/UL (ref 0–0.4)
EOSINOPHIL NFR BLD: 2 % (ref 0–7)
ERYTHROCYTE [DISTWIDTH] IN BLOOD BY AUTOMATED COUNT: 11.7 % (ref 11.5–14.5)
GLOBULIN SER CALC-MCNC: 4 G/DL (ref 2–4)
GLUCOSE SERPL-MCNC: 112 MG/DL (ref 65–100)
HCT VFR BLD AUTO: 43 % (ref 35–47)
HGB BLD-MCNC: 14.6 G/DL (ref 11.5–16)
IMM GRANULOCYTES # BLD AUTO: 0 K/UL (ref 0–0.04)
IMM GRANULOCYTES NFR BLD AUTO: 0 % (ref 0–0.5)
LYMPHOCYTES # BLD: 2.3 K/UL (ref 0.8–3.5)
LYMPHOCYTES NFR BLD: 32 % (ref 12–49)
MCH RBC QN AUTO: 31.9 PG (ref 26–34)
MCHC RBC AUTO-ENTMCNC: 34 G/DL (ref 30–36.5)
MCV RBC AUTO: 94.1 FL (ref 80–99)
MONOCYTES # BLD: 0.5 K/UL (ref 0–1)
MONOCYTES NFR BLD: 7 % (ref 5–13)
NEUTS SEG # BLD: 4.2 K/UL (ref 1.8–8)
NEUTS SEG NFR BLD: 58 % (ref 32–75)
NRBC # BLD: 0 K/UL (ref 0–0.01)
NRBC BLD-RTO: 0 PER 100 WBC
PLATELET # BLD AUTO: 336 K/UL (ref 150–400)
PMV BLD AUTO: 9 FL (ref 8.9–12.9)
POTASSIUM SERPL-SCNC: 3.7 MMOL/L (ref 3.5–5.1)
PROT SERPL-MCNC: 7.9 G/DL (ref 6.4–8.2)
RBC # BLD AUTO: 4.57 M/UL (ref 3.8–5.2)
SAMPLES BEING HELD,HOLD: NORMAL
SODIUM SERPL-SCNC: 138 MMOL/L (ref 136–145)
WBC # BLD AUTO: 7.2 K/UL (ref 3.6–11)

## 2021-05-06 PROCEDURE — 85025 COMPLETE CBC W/AUTO DIFF WBC: CPT

## 2021-05-06 PROCEDURE — 80053 COMPREHEN METABOLIC PANEL: CPT

## 2021-05-06 PROCEDURE — 36415 COLL VENOUS BLD VENIPUNCTURE: CPT

## 2021-05-06 PROCEDURE — 99284 EMERGENCY DEPT VISIT MOD MDM: CPT

## 2021-05-06 NOTE — ED TRIAGE NOTES
Pt reports she had a mass removed from her R parotid gland on 4/19/21. Pt was seen here on 5/1/21 due to possible infection after surgery. Pt started taking Cefdinir on Sunday and has had diarrhea ever since. Pt went to ENT doctor this morning and he drained some fluid off the surgical site and told her to come here for the diarrhea and r/o c-diff.

## 2021-05-06 NOTE — ED PROVIDER NOTES
46-year-old female with history of anxiety, GERD, hemorrhoids, recent parotid surgery presents to the emergency department after she is evaluated by her ENT with concern for potential C. difficile. She was started on Keflex after the surgery and switch to SANCHEZ NICANOR on Saturday for worsening redness and pain at the site. She has had fluid drained from the surgical site by her ENT. She was there today, had fluid drained, has been having worsening diarrhea and sent to the emergency department to evaluate for potential infection. She denies any fevers or nausea. Her diarrhea is liquid but not uncontrollable. She notes no abnormal foul odor. The history is provided by the patient and medical records. Diarrhea   This is a new problem. The current episode started more than 2 days ago. The problem occurs constantly. The problem has been gradually worsening. The patient is experiencing no pain. Associated symptoms include diarrhea. Pertinent negatives include no fever, no nausea, no vomiting, no dysuria, no headaches, no arthralgias, no myalgias and no chest pain. Past Medical History:   Diagnosis Date    Anxiety     Arthritis     OSTEO-KNEES    Dermatitis     atypical    Fibrocystic breast     GERD (gastroesophageal reflux disease)     Hemorrhoid     History of kidney stones     Last times was circa . 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          Family History:   Problem Relation Age of Onset    Arthritis-osteo Mother     Cancer Father         lung -heavy smoker    Cancer Maternal Aunt         breast  Breast Cancer Maternal Aunt     Breast Cancer Maternal Aunt     Anesth Problems Neg Hx        Social History     Socioeconomic History    Marital status:      Spouse name: Not on file    Number of children: Not on file    Years of education: Not on file    Highest education level: Not on file   Occupational History    Not on file   Social Needs    Financial resource strain: Not on file    Food insecurity     Worry: Not on file     Inability: Not on file    Transportation needs     Medical: Not on file     Non-medical: Not on file   Tobacco Use    Smoking status: Current Every Day Smoker     Packs/day: 0.50     Years: 40.00     Pack years: 20.00     Types: Cigarettes    Smokeless tobacco: Never Used   Substance and Sexual Activity    Alcohol use: Yes     Alcohol/week: 3.3 standard drinks     Types: 4 Glasses of wine per week    Drug use: No    Sexual activity: Yes     Partners: Male   Lifestyle    Physical activity     Days per week: Not on file     Minutes per session: Not on file    Stress: Not on file   Relationships    Social connections     Talks on phone: Not on file     Gets together: Not on file     Attends Confucianism service: Not on file     Active member of club or organization: Not on file     Attends meetings of clubs or organizations: Not on file     Relationship status: Not on file    Intimate partner violence     Fear of current or ex partner: Not on file     Emotionally abused: Not on file     Physically abused: Not on file     Forced sexual activity: Not on file   Other Topics Concern    Not on file   Social History Narrative    Not on file         ALLERGIES: Sulfa (sulfonamide antibiotics), Clindamycin, Keflex [cephalexin], and Pcn [penicillins]    Review of Systems   Constitutional: Negative for fatigue and fever. HENT: Negative for sneezing and sore throat. Respiratory: Negative for cough and shortness of breath.     Cardiovascular: Negative for chest pain and leg swelling. Gastrointestinal: Positive for diarrhea. Negative for abdominal pain, nausea and vomiting. Genitourinary: Negative for difficulty urinating and dysuria. Musculoskeletal: Negative for arthralgias and myalgias. Skin: Negative for color change and rash. Neurological: Negative for weakness and headaches. Psychiatric/Behavioral: Negative for agitation and behavioral problems. Vitals:    05/06/21 1048 05/06/21 1207   BP: (!) 174/121 (!) 151/84   Pulse: 80 76   Resp: 18 18   Temp: 97.8 °F (36.6 °C) 98.6 °F (37 °C)   SpO2: 99% 100%   Weight: 85.7 kg (189 lb)    Height: 5' 7\" (1.702 m)             Physical Exam  HENT:      Head:            MDM  Number of Diagnoses or Management Options  Diagnosis management comments: 53-year-old female presents as above, sent for evaluation for potential C. difficile colitis. She has normal vital signs, no leukocytosis, no fever. Abdominal exam is benign. She is low risk for C. difficile. As she is on Omnicef, I suspect this is the source for her diarrhea. She has no evidence of infection at the current time plan to discontinue antibiotics at this time. Follow-up with ENT. Amount and/or Complexity of Data Reviewed  Clinical lab tests: reviewed           Procedures               1400: Discussed with on-call for ENT. He is unsure about 's differences regarding antibiotics.

## 2021-05-06 NOTE — DISCHARGE INSTRUCTIONS
Thank you for allowing us to provide you with medical care today. We realize that you have many choices for your emergency care needs. We thank you for choosing [facility]. Please choose us in the future for any continued health care needs. We hope we addressed all of your medical concerns. We strive to provide excellent quality care in the Emergency Department. Anything less than excellent does not meet our expectations. The exam and treatment you received in the Emergency Department were for an emergent problem and are not intended as complete care. It is important that you follow up with a doctor, nurse practitioner, or physician's assistant for ongoing care. If your symptoms worsen or you do not improve as expected and you are unable to reach your usual health care provider, you should return to the Emergency Department. We are available 24 hours a day. Take this sheet with you when you go to your follow-up visit. If you have any problem arranging the follow-up visit, contact the Emergency Department immediately. Make an appointment your family doctor for follow up of this visit. Return to the ER if you are unable to be seen in a timely manner.

## 2021-05-07 ENCOUNTER — PATIENT OUTREACH (OUTPATIENT)
Dept: CASE MANAGEMENT | Age: 66
End: 2021-05-07

## 2021-05-07 NOTE — PROGRESS NOTES
Called and LM 5/7 to f/u [facility] 4/19-4/20 and [facility] ER visits on 5/1 and 5/6. Will continue to try and reach.

## 2021-05-11 DIAGNOSIS — J30.9 ALLERGIC RHINITIS, UNSPECIFIED SEASONALITY, UNSPECIFIED TRIGGER: ICD-10-CM

## 2021-05-11 RX ORDER — AZELASTINE 1 MG/ML
SPRAY, METERED NASAL
Qty: 1 BOTTLE | Refills: 1 | Status: SHIPPED | OUTPATIENT
Start: 2021-05-11 | End: 2021-06-04

## 2021-05-11 NOTE — TELEPHONE ENCOUNTER
Requested Prescriptions     Pending Prescriptions Disp Refills    azelastine (ASTELIN) 137 mcg (0.1 %) nasal spray 1 Bottle 1     Sig: INSTILL 1 SPRAY BY BOTH NOSTRILS ROUTE TWO (2) TIMES A DAY.                    [facility] #[id number]- [place], [address]

## 2021-05-25 ENCOUNTER — PATIENT OUTREACH (OUTPATIENT)
Dept: CASE MANAGEMENT | Age: 66
End: 2021-05-25

## 2021-05-25 NOTE — PROGRESS NOTES
Patient resolved from Transition of Care episode on 5/25/21. ACM/CTN was unsuccessful at contacting this patient today. Did speak briefly to , he said she was doing fine and was aqt work. He will give her my number if any concerns. Patient/family was provided the following resources and education related to COVID-19 during the initial call:                         Signs, symptoms and red flags related to COVID-19            CDC exposure and quarantine guidelines            [facility] exposure contact - [phone]            Contact for their local Department of Health                 Patient has not had any additional ED or hospital visits. No further outreach scheduled with this CTN/ACM. Episode of Care resolved. Patient has this CTN/ACM contact information if future needs arise.

## 2021-06-04 DIAGNOSIS — J30.9 ALLERGIC RHINITIS, UNSPECIFIED SEASONALITY, UNSPECIFIED TRIGGER: ICD-10-CM

## 2021-06-04 RX ORDER — AZELASTINE 1 MG/ML
SPRAY, METERED NASAL
Qty: 1 BOTTLE | Refills: 1 | Status: SHIPPED | OUTPATIENT
Start: 2021-06-04 | End: 2021-06-18 | Stop reason: SDUPTHER

## 2021-06-16 ENCOUNTER — OFFICE VISIT (OUTPATIENT)
Dept: INTERNAL MEDICINE CLINIC | Age: 66
End: 2021-06-16
Payer: COMMERCIAL

## 2021-06-16 ENCOUNTER — HOSPITAL ENCOUNTER (OUTPATIENT)
Dept: LAB | Age: 66
Discharge: HOME | End: 2021-06-16
Payer: COMMERCIAL

## 2021-06-16 VITALS
HEART RATE: 88 BPM | WEIGHT: 188.6 LBS | TEMPERATURE: 97.6 F | OXYGEN SATURATION: 99 % | BODY MASS INDEX: 29.6 KG/M2 | SYSTOLIC BLOOD PRESSURE: 147 MMHG | RESPIRATION RATE: 16 BRPM | HEIGHT: 67 IN | DIASTOLIC BLOOD PRESSURE: 76 MMHG

## 2021-06-16 DIAGNOSIS — Z12.4 PAP SMEAR FOR CERVICAL CANCER SCREENING: Primary | ICD-10-CM

## 2021-06-16 PROCEDURE — 99213 OFFICE O/P EST LOW 20 MIN: CPT | Performed by: INTERNAL MEDICINE

## 2021-06-16 PROCEDURE — 87624 HPV HI-RISK TYP POOLED RSLT: CPT

## 2021-06-16 NOTE — PROGRESS NOTES
Assessment/Plan:     1. Pap smear for cervical cancer screening  -pap testing completed today. - PAP IG, APTIMA HPV AND RFX 16/[birth year] ([id number]); Future         Follow-up Disposition:       Disclaimer:  Return if symptoms worsen or fail to improve. Advised patient to call back or return to office if symptoms worsen/change/persist.     Discussed expected course/resolution/complications of diagnosis in detail with patient. Medication risks/benefits/costs/interactions/alternatives discussed with patient. Patient was given an after visit summary which includes diagnoses, current medications, & vitals. Patient expressed understanding with the diagnosis and plan. Subjective:      Haylie Nelson is a 63 y.o. female who presents today for her screening pap test.    -post menopausal for over 14 years.   -no abnormal bleeding  -no abnormal discharge  -last pap done 2/2017      Current Outpatient Medications   Medication Sig Dispense Refill    azelastine (ASTELIN) 137 mcg (0.1 %) nasal spray INSTILL 1 SPRAY BY BOTH NOSTRILS ROUTE TWO (2) TIMES A DAY. 1 Bottle 1    B infantis/B ani/B anastasiya/B bifid (PROBIOTIC 4X PO) Take  by mouth.  TURMERIC PO Take  by mouth.  fluticasone propionate (FLONASE) 50 mcg/actuation nasal spray 2 Sprays by Both Nostrils route daily. 1 Bottle 0    cholecalciferol (VITAMIN D3) 1,000 unit tablet Take 1,000 Units by mouth daily.  VITAMIN B COMPLEX (B COMPLEX 1 PO) Take 1 Cap by mouth daily.  mupirocin calcium (Bactroban) 2 % topical cream Apply  to affected area two (2) times a day. (Patient not taking: Reported on 6/16/2021) 15 g 0        Review of Systems    Pertinent items are noted in HPI. Objective:      Wt Readings from Last 3 Encounters:   06/16/21 188 lb 9.6 oz (85.5 kg)   05/06/21 189 lb (85.7 kg)   04/19/21 190 lb (86.2 kg)     BP Readings from Last 3 Encounters:   06/16/21 (!) 147/76   05/06/21 (!) 153/93   05/01/21 (!) 164/84     Visit Vitals  BP (!) 147/76   Pulse 88   Temp 97.6 °F (36.4 °C) (Temporal)   Resp 16   Ht 5' 7\" (1.702 m)   Wt 188 lb 9.6 oz (85.5 kg)   LMP 10/28/2002 (Exact Date) Comment: age 54   SpO2 99%   BMI 29.54 kg/m²     General appearance: alert, cooperative, no distress, appears stated age  Head: Normocephalic, without obvious abnormality, atraumatic  Pelvic: External genitalia normal, Vagina normal without discharge, cervix normal in appearance, no CMT, uterus normal size, shape, and consistency, no adnexal masses or tenderness, rectovaginal septum normal, manual exam limited due to obesity.

## 2021-06-18 DIAGNOSIS — J30.9 ALLERGIC RHINITIS, UNSPECIFIED SEASONALITY, UNSPECIFIED TRIGGER: ICD-10-CM

## 2021-06-18 RX ORDER — AZELASTINE 1 MG/ML
SPRAY, METERED NASAL
Qty: 1 BOTTLE | Refills: 1 | Status: SHIPPED | OUTPATIENT
Start: 2021-06-18

## 2021-06-18 NOTE — TELEPHONE ENCOUNTER
Requested Prescriptions     Pending Prescriptions Disp Refills    azelastine (ASTELIN) 137 mcg (0.1 %) nasal spray 1 Bottle 1     Sig: Use in each nostril as directed     [id number]- [place], [address] 18-Nov-2019 16:43

## 2021-06-21 LAB
CYTOLOGIST CVX/VAG CYTO: NORMAL
CYTOLOGY CVX/VAG DOC THIN PREP: NORMAL
HPV APTIMA: NEGATIVE
Lab: NORMAL
PATH REPORT.FINAL DX SPEC: NORMAL
STAT OF ADQ CVX/VAG CYTO-IMP: NORMAL

## 2022-01-26 ENCOUNTER — TRANSCRIBE ORDER (OUTPATIENT)
Dept: SCHEDULING | Age: 67
End: 2022-01-26

## 2022-01-26 DIAGNOSIS — Z12.31 ENCOUNTER FOR MAMMOGRAM TO ESTABLISH BASELINE MAMMOGRAM: Primary | ICD-10-CM

## 2022-03-18 PROBLEM — D49.0 TUMOR OF PAROTID GLAND: Status: ACTIVE | Noted: 2021-04-19

## 2022-03-19 PROBLEM — K11.8 MASS OF RIGHT PAROTID GLAND: Status: ACTIVE | Noted: 2021-04-19

## 2022-03-19 PROBLEM — Z79.899 CONTROLLED SUBSTANCE AGREEMENT SIGNED: Status: ACTIVE | Noted: 2017-02-24

## 2022-03-19 PROBLEM — Z71.89 ADVANCE DIRECTIVE DISCUSSED WITH PATIENT: Status: ACTIVE | Noted: 2017-02-24

## 2022-08-03 ENCOUNTER — TELEPHONE (OUTPATIENT)
Dept: INTERNAL MEDICINE CLINIC | Age: 67
End: 2022-08-03

## 2022-08-04 ENCOUNTER — HOSPITAL ENCOUNTER (EMERGENCY)
Age: 67
Discharge: HOME | End: 2022-08-04
Attending: STUDENT IN AN ORGANIZED HEALTH CARE EDUCATION/TRAINING PROGRAM
Payer: MEDICARE

## 2022-08-04 ENCOUNTER — TELEPHONE (OUTPATIENT)
Dept: INTERNAL MEDICINE CLINIC | Age: 67
End: 2022-08-04

## 2022-08-04 VITALS
SYSTOLIC BLOOD PRESSURE: 152 MMHG | TEMPERATURE: 98.9 F | RESPIRATION RATE: 18 BRPM | OXYGEN SATURATION: 97 % | WEIGHT: 186 LBS | HEIGHT: 67 IN | BODY MASS INDEX: 29.19 KG/M2 | HEART RATE: 76 BPM | DIASTOLIC BLOOD PRESSURE: 91 MMHG

## 2022-08-04 DIAGNOSIS — J20.9 ACUTE BRONCHITIS, UNSPECIFIED ORGANISM: Primary | ICD-10-CM

## 2022-08-04 LAB
ANION GAP SERPL CALC-SCNC: 8 MMOL/L (ref 5–15)
BASOPHILS # BLD: 0.1 K/UL (ref 0–0.1)
BASOPHILS NFR BLD: 1 % (ref 0–1)
BUN SERPL-MCNC: 11 MG/DL (ref 6–20)
BUN/CREAT SERPL: 16 (ref 12–20)
CALCIUM SERPL-MCNC: 9.4 MG/DL (ref 8.5–10.1)
CHLORIDE SERPL-SCNC: 98 MMOL/L (ref 97–108)
CO2 SERPL-SCNC: 28 MMOL/L (ref 21–32)
CREAT SERPL-MCNC: 0.7 MG/DL (ref 0.55–1.02)
DIFFERENTIAL METHOD BLD: ABNORMAL
EOSINOPHIL # BLD: 0.2 K/UL (ref 0–0.4)
EOSINOPHIL NFR BLD: 2 % (ref 0–7)
ERYTHROCYTE [DISTWIDTH] IN BLOOD BY AUTOMATED COUNT: 12.4 % (ref 11.5–14.5)
GLUCOSE SERPL-MCNC: 94 MG/DL (ref 65–100)
HCT VFR BLD AUTO: 47.5 % (ref 35–47)
HGB BLD-MCNC: 15.9 G/DL (ref 11.5–16)
IMM GRANULOCYTES # BLD AUTO: 0 K/UL (ref 0–0.04)
IMM GRANULOCYTES NFR BLD AUTO: 1 % (ref 0–0.5)
LYMPHOCYTES # BLD: 2.2 K/UL (ref 0.8–3.5)
LYMPHOCYTES NFR BLD: 30 % (ref 12–49)
MCH RBC QN AUTO: 32.4 PG (ref 26–34)
MCHC RBC AUTO-ENTMCNC: 33.5 G/DL (ref 30–36.5)
MCV RBC AUTO: 96.7 FL (ref 80–99)
MONOCYTES # BLD: 0.7 K/UL (ref 0–1)
MONOCYTES NFR BLD: 10 % (ref 5–13)
NEUTS SEG # BLD: 4.1 K/UL (ref 1.8–8)
NEUTS SEG NFR BLD: 56 % (ref 32–75)
NRBC # BLD: 0 K/UL (ref 0–0.01)
NRBC BLD-RTO: 0 PER 100 WBC
PLATELET # BLD AUTO: 279 K/UL (ref 150–400)
PMV BLD AUTO: 8.9 FL (ref 8.9–12.9)
POTASSIUM SERPL-SCNC: 3.7 MMOL/L (ref 3.5–5.1)
RBC # BLD AUTO: 4.91 M/UL (ref 3.8–5.2)
SODIUM SERPL-SCNC: 134 MMOL/L (ref 136–145)
WBC # BLD AUTO: 7.4 K/UL (ref 3.6–11)

## 2022-08-04 PROCEDURE — 80048 BASIC METABOLIC PNL TOTAL CA: CPT

## 2022-08-04 PROCEDURE — 99283 EMERGENCY DEPT VISIT LOW MDM: CPT

## 2022-08-04 PROCEDURE — 85025 COMPLETE CBC W/AUTO DIFF WBC: CPT

## 2022-08-04 PROCEDURE — 36415 COLL VENOUS BLD VENIPUNCTURE: CPT

## 2022-08-04 RX ORDER — AZITHROMYCIN 250 MG/1
TABLET, FILM COATED ORAL
Qty: 6 TABLET | Refills: 0 | Status: SHIPPED | OUTPATIENT
Start: 2022-08-04

## 2022-08-04 NOTE — TELEPHONE ENCOUNTER
----- Message from Robert Regan sent at 8/3/2022  2:00 PM EDT -----  Subject: Message to Provider    QUESTIONS  Information for Provider? pt went to [facility] on 8/2/2022   pt tested negative covid 19 pt was diagnosed with bronchitis please, call   pt to schedule pt is requesting an appt for asap   ---------------------------------------------------------------------------  --------------  [address] [facility]  [phone]; OK to leave message on voicemail  ---------------------------------------------------------------------------  --------------  SCRIPT ANSWERS  Relationship to Patient? Self  Are you currently unable to finish sentences due to any difficulty   breathing? No  Are you unable to swallow liquids? No  Are you having fevers (100.4 or greater), chills, or sweats? No  Do you have COPD, asthma or a chronic lung condition? No  Have your symptoms been present for more than 5 days? No  Have you recently (14 days) been seen by a provider for this issue?  Yes

## 2022-08-04 NOTE — ED NOTES
The patient was discharged home by Dr Toña Thompson  in stable condition. The patient is alert and oriented, in no respiratory distress and discharge vital signs obtained. The patient's diagnosis, condition and treatment were explained. The patient expressed understanding. Prescriptions e-scribed to pharmacy. No work/school note given. A discharge plan has been developed. A  was not involved in the process. Aftercare instructions were given. Pt ambulatory out of the ED.

## 2022-08-04 NOTE — ED PROVIDER NOTES
Date: 8/4/2022  Patient Name: Gayle Felix    History of Presenting Illness     Chief Complaint   Patient presents with    Back Pain       History Provided By: Patient    HPI: Gayle Felix, 74 y.o. female  presents to the ED with cc of cough and back pain. 8 days of cough, clear sputum. Nasal congestion intitially, then progressed to cough over the past several days. She has not had hemoptysis. Denies vomiting or diarrhea. No fevers. She has felt achy pain in her back. States that it is moderate intensity, intermittent and migratory throughout her entire thoracic back. No falls or trauma. Denies midline or focal pain. There are no other complaints, changes, or physical findings at this time. PCP: Mignon Dougherty MD    No current facility-administered medications on file prior to encounter. Current Outpatient Medications on File Prior to Encounter   Medication Sig Dispense Refill    B infantis/B ani/B anastasiya/B bifid (PROBIOTIC 4X PO) Take  by mouth. TURMERIC PO Take  by mouth. azelastine (ASTELIN) 137 mcg (0.1 %) nasal spray Use in each nostril as directed 1 Bottle 1    mupirocin calcium (Bactroban) 2 % topical cream Apply  to affected area two (2) times a day. (Patient not taking: Reported on 6/16/2021) 15 g 0    fluticasone propionate (FLONASE) 50 mcg/actuation nasal spray 2 Sprays by Both Nostrils route daily. 1 Bottle 0    cholecalciferol (VITAMIN D3) 1,000 unit tablet Take 1,000 Units by mouth daily. VITAMIN B COMPLEX (B COMPLEX 1 PO) Take 1 Cap by mouth daily. Past History     Past Medical History:  Past Medical History:   Diagnosis Date    Anxiety     Arthritis     OSTEO-KNEES    Dermatitis     atypical    Fibrocystic breast     GERD (gastroesophageal reflux disease)     Hemorrhoid     History of kidney stones     Last times was circa 2004.     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 Family History:  Family History   Problem Relation Age of Onset    OSTEOARTHRITIS Mother     Cancer Father         lung -heavy smoker    Cancer Maternal Aunt         breast    Breast Cancer Maternal Aunt     Breast Cancer Maternal Aunt     Anesth Problems Neg Hx        Social History:  Social History     Tobacco Use    Smoking status: Every Day     Packs/day: 0.50     Years: 40.00     Pack years: 20.00     Types: Cigarettes    Smokeless tobacco: Never   Vaping Use    Vaping Use: Never used   Substance Use Topics    Alcohol use: Yes     Alcohol/week: 3.3 standard drinks     Types: 4 Glasses of wine per week    Drug use: No       Allergies: Allergies   Allergen Reactions    Sulfa (Sulfonamide Antibiotics) Hives     1 week after starting Bactrim - 2016    Clindamycin Rash    Keflex [Cephalexin] Rash    Pcn [Penicillins] Rash         Review of Systems   Review of Systems   Constitutional:  Negative for chills, fatigue and fever. Eyes:  Negative for photophobia. Respiratory:  Positive for cough. Negative for shortness of breath. Cardiovascular:  Negative for chest pain. Gastrointestinal:  Negative for abdominal pain. Genitourinary:  Negative for dysuria. Musculoskeletal:  Positive for back pain. Neurological:  Negative for headaches. Psychiatric/Behavioral:  Negative for confusion. All other systems reviewed and are negative. Physical Exam   Physical Exam  Vitals and nursing note reviewed. Constitutional:       General: She is not in acute distress. HENT:      Head: Normocephalic and atraumatic.       Mouth/Throat:      Mouth: Mucous membranes are moist. Eyes:      Extraocular Movements: Extraocular movements intact. Cardiovascular:      Rate and Rhythm: Normal rate and regular rhythm. Pulses: Normal pulses. Pulmonary:      Effort: Pulmonary effort is normal.      Breath sounds: Normal breath sounds. Abdominal:      Palpations: Abdomen is soft. Tenderness: There is no abdominal tenderness. Musculoskeletal:      Right lower leg: No edema. Left lower leg: No edema. Skin:     General: Skin is warm and dry. Neurological:      General: No focal deficit present. Mental Status: She is alert. Mental status is at baseline. Motor: No weakness. Psychiatric:         Mood and Affect: Mood normal.       Diagnostic Study Results     Labs -  Recent Results (from the past 72 hour(s))   CBC WITH AUTOMATED DIFF    Collection Time: 08/04/22  1:52 PM   Result Value Ref Range    WBC 7.4 3.6 - 11.0 K/uL    RBC 4.91 3.80 - 5.20 M/uL    HGB 15.9 11.5 - 16.0 g/dL    HCT 47.5 (H) 35.0 - 47.0 %    MCV 96.7 80.0 - 99.0 FL    MCH 32.4 26.0 - 34.0 PG    MCHC 33.5 30.0 - 36.5 g/dL    RDW 12.4 11.5 - 14.5 %    PLATELET 132 045 - 404 K/uL    MPV 8.9 8.9 - 12.9 FL    NRBC 0.0 0.0  WBC    ABSOLUTE NRBC 0.00 0.00 - 0.01 K/uL    NEUTROPHILS 56 32 - 75 %    LYMPHOCYTES 30 12 - 49 %    MONOCYTES 10 5 - 13 %    EOSINOPHILS 2 0 - 7 %    BASOPHILS 1 0 - 1 %    IMMATURE GRANULOCYTES 1 (H) 0 - 0.5 %    ABS. NEUTROPHILS 4.1 1.8 - 8.0 K/UL    ABS. LYMPHOCYTES 2.2 0.8 - 3.5 K/UL    ABS. MONOCYTES 0.7 0.0 - 1.0 K/UL    ABS. EOSINOPHILS 0.2 0.0 - 0.4 K/UL    ABS. BASOPHILS 0.1 0.0 - 0.1 K/UL    ABS. IMM.  GRANS. 0.0 0.00 - 0.04 K/UL    DF AUTOMATED     METABOLIC PANEL, BASIC    Collection Time: 08/04/22  1:52 PM   Result Value Ref Range    Sodium 134 (L) 136 - 145 mmol/L    Potassium 3.7 3.5 - 5.1 mmol/L    Chloride 98 97 - 108 mmol/L    CO2 28 21 - 32 mmol/L    Anion gap 8 5 - 15 mmol/L    Glucose 94 65 - 100 mg/dL    BUN 11 6 - 20 MG/DL    Creatinine 0.70 0.55 - 1.02 MG/DL    BUN/Creatinine ratio 16 12 - 20      GFR est AA >60 >60 ml/min/1.73m2    GFR est non-AA >60 >60 ml/min/1.73m2    Calcium 9.4 8.5 - 10.1 MG/DL       Radiologic Studies -   No orders to display     CT Results  (Last 48 hours)      None          CXR Results  (Last 48 hours)      None            Medical Decision Making   I am the first provider for this patient. I reviewed the vital signs, available nursing notes, past medical history, past surgical history, family history and social history. Vital Signs-Reviewed the patient's vital signs. Patient Vitals for the past 12 hrs:   Temp Pulse Resp BP SpO2   08/04/22 1453 -- 76 18 (!) 152/91 97 %   08/04/22 1235 98.9 °F (37.2 °C) 90 20 (!) 159/102 98 %         Records Reviewed: Nursing Notes and Old Medical Records    Provider Notes (Medical Decision Making):   Patient complaining of cough, x-ray was previously negative, her lungs are clear and she has no distress. Given her persistent symptoms she may have developed a bacterial superinfection note given that she is an ongoing smoker she may in fact have COPD. Given prescription for azithromycin therefore. ED Course:   Initial assessment performed. The patients presenting problems have been discussed, and they are in agreement with the care plan formulated and outlined with them. I have encouraged them to ask questions as they arise throughout their visit. Disposition:      The patient's results have been reviewed with Her. She has been counseled regarding her diagnosis. She verbally conveys understanding and agreement of the signs, symptoms, diagnosis, treatment, and prognosis and additionally agrees to follow up as recommended with Dr. Sue Olsen MD in 24-48 hours. She also agrees with the care-plan and conveys that all of Her questions have been answered.  I have also put together some discharge instructions for Her that include: 1) educational information regarding their diagnosis, 2) how to care for their diagnosis at home, as well a 3) list of reasons why they would want to return to the ED prior to their follow-up appointment, should their condition change. DISCHARGE PLAN:  1. Discharge Medication List as of 8/4/2022  2:46 PM        START taking these medications    Details   azithromycin (Zithromax Z-Nirav) 250 mg tablet 500 mg once daily on day 1, 250 mg daily on day 2 through 5, Normal, Disp-6 Tablet, R-0           CONTINUE these medications which have NOT CHANGED    Details   B infantis/B ani/B anastasiya/B bifid (PROBIOTIC 4X PO) Take  by mouth., Historical Med      TURMERIC PO Take  by mouth., Historical Med      azelastine (ASTELIN) 137 mcg (0.1 %) nasal spray Use in each nostril as directed, Normal, Disp-1 Bottle, R-1      mupirocin calcium (Bactroban) 2 % topical cream Apply  to affected area two (2) times a day., Normal, Disp-15 g, R-0      fluticasone propionate (FLONASE) 50 mcg/actuation nasal spray 2 Sprays by Both Nostrils route daily. , Normal, Disp-1 Bottle, R-0      cholecalciferol (VITAMIN D3) 1,000 unit tablet Take 1,000 Units by mouth daily. , Historical Med      VITAMIN B COMPLEX (B COMPLEX 1 PO) Take 1 Cap by mouth daily. , Historical Med           2. Follow-up Information       Follow up With Specialties Details Why Contact Info    Jayce Kuhn MD Internal Medicine Physician Schedule an appointment as soon as possible for a visit  Call for a follow up appointment. [address] [place] [address] Dr Garcia 9  [phone]            3. Return to ED if worse     Diagnosis     Clinical Impression:   1. Acute bronchitis, unspecified organism        Attestations:    Kassy Avilez MD    Please note that this dictation was completed with [employer], the [employer] voice recognition software. Quite often unanticipated grammatical, syntax, homophones, and other interpretive errors are inadvertently transcribed by the computer software. Please disregard these errors.   Please excuse any errors that have escaped final proofreading. Thank you.

## 2023-05-30 RX ORDER — MUPIROCIN CALCIUM 20 MG/G
CREAM TOPICAL 2 TIMES DAILY
COMMUNITY
Start: 2021-05-01

## 2023-05-30 RX ORDER — AZELASTINE 1 MG/ML
SPRAY, METERED NASAL
COMMUNITY
Start: 2021-06-18

## 2023-05-30 RX ORDER — FLUTICASONE PROPIONATE 50 MCG
2 SPRAY, SUSPENSION (ML) NASAL DAILY
COMMUNITY
Start: 2019-09-20

## 2023-05-30 RX ORDER — AZITHROMYCIN 250 MG/1
TABLET, FILM COATED ORAL
COMMUNITY
Start: 2022-08-04

## 2023-06-19 ENCOUNTER — HOSPITAL ENCOUNTER (OUTPATIENT)
Facility: HOSPITAL | Age: 68
Discharge: HOME | End: 2023-06-22
Attending: OTOLARYNGOLOGY
Payer: MEDICARE

## 2023-06-19 DIAGNOSIS — E04.1 THYROID NODULE: ICD-10-CM

## 2023-06-19 PROCEDURE — 76536 US EXAM OF HEAD AND NECK: CPT

## 2023-11-26 ENCOUNTER — APPOINTMENT (OUTPATIENT)
Facility: HOSPITAL | Age: 68
End: 2023-11-26
Payer: MEDICARE

## 2023-11-26 ENCOUNTER — HOSPITAL ENCOUNTER (EMERGENCY)
Facility: HOSPITAL | Age: 68
Discharge: HOME | End: 2023-11-26
Attending: STUDENT IN AN ORGANIZED HEALTH CARE EDUCATION/TRAINING PROGRAM
Payer: MEDICARE

## 2023-11-26 VITALS
SYSTOLIC BLOOD PRESSURE: 131 MMHG | TEMPERATURE: 98.4 F | BODY MASS INDEX: 30.45 KG/M2 | HEART RATE: 89 BPM | DIASTOLIC BLOOD PRESSURE: 69 MMHG | RESPIRATION RATE: 16 BRPM | WEIGHT: 194 LBS | OXYGEN SATURATION: 99 % | HEIGHT: 67 IN

## 2023-11-26 DIAGNOSIS — K57.92 ACUTE DIVERTICULITIS: Primary | ICD-10-CM

## 2023-11-26 LAB
ALBUMIN SERPL-MCNC: 3.1 G/DL (ref 3.5–5)
ALBUMIN/GLOB SERPL: 0.8 (ref 1.1–2.2)
ALP SERPL-CCNC: 95 U/L (ref 45–117)
ALT SERPL-CCNC: 78 U/L (ref 12–78)
ANION GAP SERPL CALC-SCNC: 8 MMOL/L (ref 5–15)
APPEARANCE UR: CLEAR
AST SERPL-CCNC: 40 U/L (ref 15–37)
BACTERIA URNS QL MICRO: NEGATIVE /HPF
BASOPHILS # BLD: 0.1 K/UL (ref 0–0.1)
BASOPHILS NFR BLD: 1 % (ref 0–1)
BILIRUB SERPL-MCNC: 0.9 MG/DL (ref 0.2–1)
BILIRUB UR QL: NEGATIVE
BUN SERPL-MCNC: 12 MG/DL (ref 6–20)
BUN/CREAT SERPL: 17 (ref 12–20)
CALCIUM SERPL-MCNC: 8.8 MG/DL (ref 8.5–10.1)
CHLORIDE SERPL-SCNC: 95 MMOL/L (ref 97–108)
CO2 SERPL-SCNC: 30 MMOL/L (ref 21–32)
COLOR UR: ABNORMAL
CREAT SERPL-MCNC: 0.71 MG/DL (ref 0.55–1.02)
DIFFERENTIAL METHOD BLD: ABNORMAL
EOSINOPHIL # BLD: 0.1 K/UL (ref 0–0.4)
EOSINOPHIL NFR BLD: 1 % (ref 0–7)
EPITH CASTS URNS QL MICRO: ABNORMAL /LPF
ERYTHROCYTE [DISTWIDTH] IN BLOOD BY AUTOMATED COUNT: 12.1 % (ref 11.5–14.5)
GLOBULIN SER CALC-MCNC: 3.9 G/DL (ref 2–4)
GLUCOSE SERPL-MCNC: 89 MG/DL (ref 65–100)
GLUCOSE UR STRIP.AUTO-MCNC: NEGATIVE MG/DL
HCT VFR BLD AUTO: 36.3 % (ref 35–47)
HGB BLD-MCNC: 12.7 G/DL (ref 11.5–16)
HGB UR QL STRIP: ABNORMAL
IMM GRANULOCYTES # BLD AUTO: 0 K/UL (ref 0–0.04)
IMM GRANULOCYTES NFR BLD AUTO: 0 % (ref 0–0.5)
KETONES UR QL STRIP.AUTO: NEGATIVE MG/DL
LEUKOCYTE ESTERASE UR QL STRIP.AUTO: NEGATIVE
LIPASE SERPL-CCNC: 45 U/L (ref 13–75)
LYMPHOCYTES # BLD: 1.9 K/UL (ref 0.8–3.5)
LYMPHOCYTES NFR BLD: 17 % (ref 12–49)
MCH RBC QN AUTO: 33.7 PG (ref 26–34)
MCHC RBC AUTO-ENTMCNC: 35 G/DL (ref 30–36.5)
MCV RBC AUTO: 96.3 FL (ref 80–99)
MONOCYTES # BLD: 0.9 K/UL (ref 0–1)
MONOCYTES NFR BLD: 9 % (ref 5–13)
NEUTS SEG # BLD: 7.8 K/UL (ref 1.8–8)
NEUTS SEG NFR BLD: 72 % (ref 32–75)
NITRITE UR QL STRIP.AUTO: NEGATIVE
NRBC # BLD: 0 K/UL (ref 0–0.01)
NRBC BLD-RTO: 0 PER 100 WBC
PH UR STRIP: 7 (ref 5–8)
PLATELET # BLD AUTO: 290 K/UL (ref 150–400)
PMV BLD AUTO: 8.3 FL (ref 8.9–12.9)
POTASSIUM SERPL-SCNC: 3.4 MMOL/L (ref 3.5–5.1)
PROT SERPL-MCNC: 7 G/DL (ref 6.4–8.2)
PROT UR STRIP-MCNC: NEGATIVE MG/DL
RBC # BLD AUTO: 3.77 M/UL (ref 3.8–5.2)
RBC #/AREA URNS HPF: ABNORMAL /HPF (ref 0–5)
SODIUM SERPL-SCNC: 133 MMOL/L (ref 136–145)
SP GR UR REFRACTOMETRY: 1.01 (ref 1–1.03)
SPECIMEN HOLD: NORMAL
UROBILINOGEN UR QL STRIP.AUTO: 0.2 EU/DL (ref 0.2–1)
WBC # BLD AUTO: 10.8 K/UL (ref 3.6–11)
WBC URNS QL MICRO: ABNORMAL /HPF (ref 0–4)

## 2023-11-26 PROCEDURE — 99285 EMERGENCY DEPT VISIT HI MDM: CPT

## 2023-11-26 PROCEDURE — 96374 THER/PROPH/DIAG INJ IV PUSH: CPT

## 2023-11-26 PROCEDURE — 2580000003 HC RX 258: Performed by: STUDENT IN AN ORGANIZED HEALTH CARE EDUCATION/TRAINING PROGRAM

## 2023-11-26 PROCEDURE — 36415 COLL VENOUS BLD VENIPUNCTURE: CPT

## 2023-11-26 PROCEDURE — 80053 COMPREHEN METABOLIC PANEL: CPT

## 2023-11-26 PROCEDURE — 74177 CT ABD & PELVIS W/CONTRAST: CPT

## 2023-11-26 PROCEDURE — 6360000004 HC RX CONTRAST MEDICATION: Performed by: STUDENT IN AN ORGANIZED HEALTH CARE EDUCATION/TRAINING PROGRAM

## 2023-11-26 PROCEDURE — 6360000002 HC RX W HCPCS: Performed by: STUDENT IN AN ORGANIZED HEALTH CARE EDUCATION/TRAINING PROGRAM

## 2023-11-26 PROCEDURE — 83690 ASSAY OF LIPASE: CPT

## 2023-11-26 PROCEDURE — 81001 URINALYSIS AUTO W/SCOPE: CPT

## 2023-11-26 PROCEDURE — 85025 COMPLETE CBC W/AUTO DIFF WBC: CPT

## 2023-11-26 RX ORDER — METRONIDAZOLE 500 MG/1
500 TABLET ORAL 3 TIMES DAILY
Qty: 21 TABLET | Refills: 0 | Status: SHIPPED | OUTPATIENT
Start: 2023-11-26 | End: 2023-12-03

## 2023-11-26 RX ORDER — OMEPRAZOLE 20 MG/1
20 CAPSULE, DELAYED RELEASE ORAL DAILY
COMMUNITY

## 2023-11-26 RX ORDER — KETOROLAC TROMETHAMINE 30 MG/ML
15 INJECTION, SOLUTION INTRAMUSCULAR; INTRAVENOUS ONCE
Status: COMPLETED | OUTPATIENT
Start: 2023-11-26 | End: 2023-11-26

## 2023-11-26 RX ORDER — CIPROFLOXACIN 500 MG/1
500 TABLET, FILM COATED ORAL 2 TIMES DAILY
Qty: 14 TABLET | Refills: 0 | Status: SHIPPED | OUTPATIENT
Start: 2023-11-26 | End: 2023-12-03

## 2023-11-26 RX ORDER — HYDROCHLOROTHIAZIDE 12.5 MG/1
12.5 CAPSULE, GELATIN COATED ORAL DAILY
COMMUNITY

## 2023-11-26 RX ORDER — 0.9 % SODIUM CHLORIDE 0.9 %
1000 INTRAVENOUS SOLUTION INTRAVENOUS ONCE
Status: COMPLETED | OUTPATIENT
Start: 2023-11-26 | End: 2023-11-26

## 2023-11-26 RX ADMIN — KETOROLAC TROMETHAMINE 15 MG: 30 INJECTION, SOLUTION INTRAMUSCULAR; INTRAVENOUS at 13:17

## 2023-11-26 RX ADMIN — IOPAMIDOL 100 ML: 755 INJECTION, SOLUTION INTRAVENOUS at 14:25

## 2023-11-26 RX ADMIN — SODIUM CHLORIDE 1000 ML: 9 INJECTION, SOLUTION INTRAVENOUS at 13:16

## 2023-11-26 ASSESSMENT — PAIN DESCRIPTION - ORIENTATION: ORIENTATION: LEFT;LOWER

## 2023-11-26 ASSESSMENT — PAIN DESCRIPTION - DESCRIPTORS: DESCRIPTORS: ACHING;SHARP

## 2023-11-26 ASSESSMENT — PAIN - FUNCTIONAL ASSESSMENT: PAIN_FUNCTIONAL_ASSESSMENT: 0-10

## 2023-11-26 ASSESSMENT — PAIN DESCRIPTION - LOCATION: LOCATION: ABDOMEN

## 2023-11-26 ASSESSMENT — PAIN SCALES - GENERAL
PAINLEVEL_OUTOF10: 6
PAINLEVEL_OUTOF10: 6

## 2023-11-26 NOTE — DISCHARGE INSTRUCTIONS
Return if fever, vomiting, worsening pain    Your CT Showed some incidentals that you should see your primary doctor about     1. Acute sigmoid diverticulitis. No evidence of peridiverticular abscess or  bowel obstruction. 2. Diffuse fatty infiltration of the liver. 3. Indeterminate left adrenal nodule measuring 3.5 cm. There are no prior  studies for comparison. Consider outpatient nonemergent adrenal mass protocol CT  for complete characterization. 4. Atherosclerosis of the aorta.

## 2023-11-26 NOTE — ED NOTES
Patient up at doorway stating she has a problem. This nurse entered room. Patient bed soiled bed. Chux pad placed on bed. Patient underwear taken off, anti-slip socks placed on patient and patient to restroom ATT to clean up. Patient back on stretcher resting comfortably. Patient re-educated on call glaser use.      Guido Wilson RN  11/26/23 1400

## 2023-11-26 NOTE — ED NOTES
The patient was discharged home by provider in stable condition. The patient is alert and oriented, in no respiratory distress. The patient's diagnosis, condition and treatment were explained. The patient expressed understanding and denies any questions or concerns at this time. Patient leaves treatment area ambulatory with all personal belongings. Patient given mesh underwear for comfort.      Alton Felix RN  11/26/23 7138

## 2023-11-26 NOTE — ED TRIAGE NOTES
[facility]CS 15. Patient ambulatory to treatment area. Patient states 2 weeks ago she had covid. Wednesday she started to have severe indigestion and Friday she started with a \"weird pain\" in her LLQ. Patient describes the pain is aching and stabbing intermittently. Patient states her breathe has been warm and her fingers are aching.

## 2023-11-28 ASSESSMENT — ENCOUNTER SYMPTOMS
SINUS PAIN: 1
ABDOMINAL PAIN: 1

## 2023-11-28 NOTE — ED PROVIDER NOTES
[facility] EMERGENCY DEPT  EMERGENCY DEPARTMENT ENCOUNTER      Pt Name: Leatrice Boast  MRN: [id number]  [address] [facility] [place] 1955  Date of evaluation: 2023  Provider: Darlene Michaud       Chief Complaint   Patient presents with    Abdominal Pain    Sinusitis         HISTORY OF PRESENT ILLNESS   (Location/Symptom, Timing/Onset, Context/Setting, Quality, Duration, Modifying Factors, Severity)  Note limiting factors. 53-year-old female presents today with abdominal pain. She reports that she has had left lower quadrant abdominal pain for the past 4 to 5 days. It is worse with certain movements. When sitting still it seems to be better. Patient reports flulike symptoms as well with myalgias and fatigue. No vomiting or diarrhea. No fevers recorded. She is also complaining of burning in the sinuses. Concerned about a sinus infection. Recent COVID diagnosis. Review of External Medical Records:     Nursing Notes were reviewed. REVIEW OF SYSTEMS    (2-9 systems for level 4, 10 or more for level 5)     Review of Systems   Constitutional:  Positive for fatigue. HENT:  Positive for sinus pain. Gastrointestinal:  Positive for abdominal pain. Musculoskeletal:  Positive for myalgias. Except as noted above the remainder of the review of systems was reviewed and negative. PAST MEDICAL HISTORY     Past Medical History:   Diagnosis Date    Anxiety     Arthritis     OSTEO-KNEES    Dermatitis     atypical    Fibrocystic breast     GERD (gastroesophageal reflux disease)     Hemorrhoid     History of kidney stones     Last times was circa .     History of vertigo     TIA (transient ischemic attack) 2020         SURGICAL HISTORY       Past Surgical History:   Procedure Laterality Date    COLONOSCOPY      GYN       x2    ORTHOPEDIC SURGERY      cyst removed left 3rd digit - Dr Naldo Love HISTORY  2014    Excision of left lower

## 2025-03-10 ENCOUNTER — HOSPITAL ENCOUNTER (OUTPATIENT)
Facility: HOSPITAL | Age: 70
Discharge: HOME | End: 2025-03-13
Payer: MEDICARE

## 2025-03-10 VITALS
WEIGHT: 203.26 LBS | SYSTOLIC BLOOD PRESSURE: 130 MMHG | BODY MASS INDEX: 31.9 KG/M2 | HEIGHT: 67 IN | HEART RATE: 72 BPM | TEMPERATURE: 97.1 F | RESPIRATION RATE: 16 BRPM | DIASTOLIC BLOOD PRESSURE: 70 MMHG

## 2025-03-10 LAB
ALBUMIN SERPL-MCNC: 4 G/DL (ref 3.5–5)
ALBUMIN/GLOB SERPL: 1.3 (ref 1.1–2.2)
ALP SERPL-CCNC: 84 U/L (ref 45–117)
ALT SERPL-CCNC: 45 U/L (ref 12–78)
ANION GAP SERPL CALC-SCNC: 9 MMOL/L (ref 2–12)
APPEARANCE UR: CLEAR
AST SERPL-CCNC: 30 U/L (ref 15–37)
BACTERIA URNS QL MICRO: NEGATIVE /HPF
BASOPHILS # BLD: 0.06 K/UL (ref 0–0.1)
BASOPHILS NFR BLD: 1 % (ref 0–1)
BILIRUB SERPL-MCNC: 0.5 MG/DL (ref 0.2–1)
BILIRUB UR QL: NEGATIVE
BUN SERPL-MCNC: 13 MG/DL (ref 6–20)
BUN/CREAT SERPL: 21 (ref 12–20)
CALCIUM SERPL-MCNC: 9.6 MG/DL (ref 8.5–10.1)
CHLORIDE SERPL-SCNC: 96 MMOL/L (ref 97–108)
CO2 SERPL-SCNC: 27 MMOL/L (ref 21–32)
COLOR UR: ABNORMAL
CREAT SERPL-MCNC: 0.62 MG/DL (ref 0.55–1.02)
DIFFERENTIAL METHOD BLD: NORMAL
EOSINOPHIL # BLD: 0.17 K/UL (ref 0–0.4)
EOSINOPHIL NFR BLD: 2.7 % (ref 0–7)
EPITH CASTS URNS QL MICRO: ABNORMAL /LPF
ERYTHROCYTE [DISTWIDTH] IN BLOOD BY AUTOMATED COUNT: 11.6 % (ref 11.5–14.5)
EST. AVERAGE GLUCOSE BLD GHB EST-MCNC: 117 MG/DL
GLOBULIN SER CALC-MCNC: 3.2 G/DL (ref 2–4)
GLUCOSE SERPL-MCNC: 109 MG/DL (ref 65–100)
GLUCOSE UR STRIP.AUTO-MCNC: NEGATIVE MG/DL
HBA1C MFR BLD: 5.7 % (ref 4–5.6)
HCT VFR BLD AUTO: 39.2 % (ref 35–47)
HGB BLD-MCNC: 13.7 G/DL (ref 11.5–16)
HGB UR QL STRIP: NEGATIVE
HYALINE CASTS URNS QL MICRO: ABNORMAL /LPF (ref 0–2)
IMM GRANULOCYTES # BLD AUTO: 0.02 K/UL (ref 0–0.04)
IMM GRANULOCYTES NFR BLD AUTO: 0.3 % (ref 0–0.5)
KETONES UR QL STRIP.AUTO: NEGATIVE MG/DL
LEUKOCYTE ESTERASE UR QL STRIP.AUTO: ABNORMAL
LYMPHOCYTES # BLD: 1.81 K/UL (ref 0.8–3.5)
LYMPHOCYTES NFR BLD: 29.1 % (ref 12–49)
MCH RBC QN AUTO: 33.6 PG (ref 26–34)
MCHC RBC AUTO-ENTMCNC: 34.9 G/DL (ref 30–36.5)
MCV RBC AUTO: 96.1 FL (ref 80–99)
MONOCYTES # BLD: 0.7 K/UL (ref 0–1)
MONOCYTES NFR BLD: 11.2 % (ref 5–13)
NEUTS SEG # BLD: 3.47 K/UL (ref 1.8–8)
NEUTS SEG NFR BLD: 55.7 % (ref 32–75)
NITRITE UR QL STRIP.AUTO: NEGATIVE
NRBC # BLD: 0 K/UL (ref 0–0.01)
NRBC BLD-RTO: 0 PER 100 WBC
PH UR STRIP: 7 (ref 5–8)
PLATELET # BLD AUTO: 277 K/UL (ref 150–400)
PMV BLD AUTO: 9.3 FL (ref 8.9–12.9)
POTASSIUM SERPL-SCNC: 3.8 MMOL/L (ref 3.5–5.1)
PROT SERPL-MCNC: 7.2 G/DL (ref 6.4–8.2)
PROT UR STRIP-MCNC: NEGATIVE MG/DL
RBC # BLD AUTO: 4.08 M/UL (ref 3.8–5.2)
RBC #/AREA URNS HPF: ABNORMAL /HPF (ref 0–5)
SODIUM SERPL-SCNC: 132 MMOL/L (ref 136–145)
SP GR UR REFRACTOMETRY: 1.01 (ref 1–1.03)
URINE CULTURE IF INDICATED: ABNORMAL
UROBILINOGEN UR QL STRIP.AUTO: 0.2 EU/DL (ref 0.2–1)
WBC # BLD AUTO: 6.2 K/UL (ref 3.6–11)
WBC URNS QL MICRO: ABNORMAL /HPF (ref 0–4)

## 2025-03-10 PROCEDURE — 85025 COMPLETE CBC W/AUTO DIFF WBC: CPT

## 2025-03-10 PROCEDURE — 36415 COLL VENOUS BLD VENIPUNCTURE: CPT

## 2025-03-10 PROCEDURE — 80053 COMPREHEN METABOLIC PANEL: CPT

## 2025-03-10 PROCEDURE — 93005 ELECTROCARDIOGRAM TRACING: CPT | Performed by: ORTHOPAEDIC SURGERY

## 2025-03-10 PROCEDURE — 81001 URINALYSIS AUTO W/SCOPE: CPT

## 2025-03-10 PROCEDURE — 83036 HEMOGLOBIN GLYCOSYLATED A1C: CPT

## 2025-03-10 RX ORDER — ACETAMINOPHEN 325 MG/1
650 TABLET ORAL EVERY 6 HOURS PRN
COMMUNITY

## 2025-03-10 RX ORDER — TRAMADOL HYDROCHLORIDE 50 MG/1
50 TABLET ORAL EVERY 6 HOURS PRN
COMMUNITY

## 2025-03-10 ASSESSMENT — PAIN DESCRIPTION - ORIENTATION: ORIENTATION: RIGHT

## 2025-03-10 ASSESSMENT — PAIN DESCRIPTION - DESCRIPTORS: DESCRIPTORS: ACHING

## 2025-03-10 ASSESSMENT — PAIN SCALES - GENERAL: PAINLEVEL_OUTOF10: 7

## 2025-03-10 ASSESSMENT — PAIN DESCRIPTION - PAIN TYPE: TYPE: CHRONIC PAIN

## 2025-03-10 ASSESSMENT — PAIN DESCRIPTION - LOCATION: LOCATION: KNEE

## 2025-03-10 NOTE — DISCHARGE INSTRUCTIONS
[facility]                   [address] [place] [address]   MAIN OR                                  [phone]   MAIN PRE OP                          [phone]                                                                                AMBULATORY PRE OP          [phone]  PRE-ADMISSION TESTING    [phone]   Surgery Date: 3/19/25Wednesday      Is surgery arrival time given by surgeon?  YES  NO  If “NO”, [facility] staff will call you between 3 and 7pm the day before your surgery with your arrival time. (If your surgery is on a Monday, we will call you the Friday before.)    Call [phone] after 7pm Monday-Friday if you did not receive this call.    INSTRUCTIONS BEFORE YOUR SURGERY   When You  Arrive Arrive at the 2nd Floor Admitting Desk on the day of your surgery  Have your insurance card, photo ID, and any copayment (if needed)   Food   and   Drink No food or drink (gum , mints, coffee, juice, etc)after midnight the night before surgery.   You may drink WATER ONLY up until 2 hours prior to your surgery time. Please do not add anything to your water as this may result in your surgery being postponed.    No alcohol (beer, wine, liquor) 24 hours before and after surgery   Medications to   TAKE   Morning of Surgery MEDICATIONS TO TAKE THE MORNING OF SURGERY WITH A SIP OF WATER:   Omeprazole  Tylenol   Medications  To  STOP      7 days before surgery Non-Steroidal anti-inflammatory Drugs (NSAID's): for example, Ibuprofen (Advil, Motrin), Naproxen (Aleve)  Aspirin, if taking for pain   Herbal supplements, vitamins, and fish oil  Other:  (Pain medications not listed above, including Tylenol may be taken)   Blood  Thinners If you take  Aspirin, Plavix, Coumadin, or any blood-thinning or anti-blood clot medicine, talk to the doctor who prescribed the medications for pre-operative instructions.   Bathing Clothing  Jewelry  Valuables     If you shower the morning

## 2025-03-11 LAB
BACTERIA SPEC CULT: NORMAL
BACTERIA SPEC CULT: NORMAL
EKG ATRIAL RATE: 64 BPM
EKG DIAGNOSIS: NORMAL
EKG P AXIS: 51 DEGREES
EKG P-R INTERVAL: 150 MS
EKG Q-T INTERVAL: 418 MS
EKG QRS DURATION: 96 MS
EKG QTC CALCULATION (BAZETT): 431 MS
EKG R AXIS: 56 DEGREES
EKG T AXIS: 62 DEGREES
EKG VENTRICULAR RATE: 64 BPM
SERVICE CMNT-IMP: NORMAL

## 2025-03-11 PROCEDURE — 93010 ELECTROCARDIOGRAM REPORT: CPT | Performed by: SPECIALIST

## 2025-03-12 ENCOUNTER — TELEPHONE (OUTPATIENT)
Facility: HOSPITAL | Age: 70
End: 2025-03-12

## 2025-03-12 DIAGNOSIS — A49.01 MSSA (METHICILLIN SUSCEPTIBLE STAPHYLOCOCCUS AUREUS): Primary | ICD-10-CM

## 2025-03-12 RX ORDER — MUPIROCIN 20 MG/G
OINTMENT TOPICAL 2 TIMES DAILY
Qty: 15 G | Refills: 0 | Status: SHIPPED | OUTPATIENT
Start: 2025-03-12 | End: 2025-03-17

## 2025-03-12 RX ORDER — CHLORHEXIDINE GLUCONATE 40 MG/ML
SOLUTION TOPICAL
Qty: 236 ML | Refills: 0 | Status: SHIPPED | OUTPATIENT
Start: 2025-03-12

## 2025-03-12 NOTE — TELEPHONE ENCOUNTER
Contacted the patient via phone number ending in [phone] to relay that she is + MSSA.     Had to leave a .     The following recommendations were stated to the patient:     Mupirocin 2% apply in both nares BID for 5 days.     Apply Chlorhexidine 4% solution BID beginning 5 days before her scheduled Revision of R TKR.

## 2025-03-14 NOTE — PERIOP NOTE
Received phone call from patient, patient states her prescriptions for Mupirocin and CHG are not at her preferred pharmacy, [facility];  I was able to view in EPIC that prescriptions for Mupirocin and CHG were sent to [facility] by Yannick Dickens NP on 3/12/25.  Patient notified, and states she will  the prescriptions today.

## 2025-03-17 NOTE — PRE-PROCEDURE INSTRUCTIONS
The patient was provided a virtual link to view the pre-operative Joint Replacement Class Video  A Patient Education Book specific to total hip or knee joint replacement surgery was given to the patient in [facility].  The content of the class was presented using an audio power point presentation specific for patients undergoing total hip and knee replacement surgery.    Incentive spirometer and CHG bath kits were verbally reviewed.   Day of surgery routine and expectations, hospital routine and expectations, nutrition, alcohol, nicotine, medications, infection control, pain management, DVT precautions and equipment, ice therapy, durable medical equipment, exercises, mobility expectations and precautions, home preparation and safety were reviewed in class video.   My contact information was shared with the patient to provide further information as requested by the patient related to their upcoming surgery.   Received confirmation that the patient and  viewed joint class online via the [employer] pre-op education video survey with quiz.

## 2025-03-18 ENCOUNTER — ANESTHESIA EVENT (OUTPATIENT)
Facility: HOSPITAL | Age: 70
DRG: 468 | End: 2025-03-18
Payer: MEDICARE

## 2025-03-18 RX ORDER — OXYCODONE HYDROCHLORIDE 5 MG/1
10 TABLET ORAL EVERY 4 HOURS PRN
Refills: 0 | Status: CANCELLED | OUTPATIENT
Start: 2025-03-18

## 2025-03-18 RX ORDER — SODIUM CHLORIDE 0.9 % (FLUSH) 0.9 %
5-40 SYRINGE (ML) INJECTION EVERY 12 HOURS SCHEDULED
Status: CANCELLED | OUTPATIENT
Start: 2025-03-18

## 2025-03-18 RX ORDER — HYDROXYZINE HYDROCHLORIDE 10 MG/1
10 TABLET, FILM COATED ORAL EVERY 8 HOURS PRN
Status: CANCELLED | OUTPATIENT
Start: 2025-03-18

## 2025-03-18 RX ORDER — ONDANSETRON 2 MG/ML
4 INJECTION INTRAMUSCULAR; INTRAVENOUS EVERY 6 HOURS PRN
Status: CANCELLED | OUTPATIENT
Start: 2025-03-18

## 2025-03-18 RX ORDER — OXYCODONE HYDROCHLORIDE 5 MG/1
5 TABLET ORAL EVERY 4 HOURS PRN
Refills: 0 | Status: CANCELLED | OUTPATIENT
Start: 2025-03-18

## 2025-03-18 RX ORDER — ASPIRIN 81 MG/1
81 TABLET ORAL 2 TIMES DAILY
Status: CANCELLED | OUTPATIENT
Start: 2025-03-18

## 2025-03-18 RX ORDER — PROMETHAZINE HYDROCHLORIDE 25 MG/1
12.5 TABLET ORAL EVERY 6 HOURS PRN
Status: CANCELLED | OUTPATIENT
Start: 2025-03-18

## 2025-03-18 RX ORDER — ACETAMINOPHEN 500 MG
1000 TABLET ORAL EVERY 8 HOURS SCHEDULED
Status: CANCELLED | OUTPATIENT
Start: 2025-03-18

## 2025-03-18 RX ORDER — MAGNESIUM HYDROXIDE/ALUMINUM HYDROXICE/SIMETHICONE 120; 1200; 1200 MG/30ML; MG/30ML; MG/30ML
15 SUSPENSION ORAL EVERY 6 HOURS PRN
Status: CANCELLED | OUTPATIENT
Start: 2025-03-18

## 2025-03-18 RX ORDER — POLYETHYLENE GLYCOL 3350 17 G/17G
17 POWDER, FOR SOLUTION ORAL DAILY
Status: CANCELLED | OUTPATIENT
Start: 2025-03-18

## 2025-03-18 RX ORDER — SODIUM CHLORIDE 9 MG/ML
INJECTION, SOLUTION INTRAVENOUS PRN
Status: CANCELLED | OUTPATIENT
Start: 2025-03-18

## 2025-03-18 RX ORDER — SODIUM CHLORIDE, SODIUM LACTATE, POTASSIUM CHLORIDE, CALCIUM CHLORIDE 600; 310; 30; 20 MG/100ML; MG/100ML; MG/100ML; MG/100ML
INJECTION, SOLUTION INTRAVENOUS CONTINUOUS
Status: CANCELLED | OUTPATIENT
Start: 2025-03-18

## 2025-03-18 RX ORDER — PANTOPRAZOLE SODIUM 40 MG/1
40 TABLET, DELAYED RELEASE ORAL
Status: CANCELLED | OUTPATIENT
Start: 2025-03-18

## 2025-03-18 RX ORDER — SODIUM CHLORIDE 0.9 % (FLUSH) 0.9 %
5-40 SYRINGE (ML) INJECTION PRN
Status: CANCELLED | OUTPATIENT
Start: 2025-03-18

## 2025-03-18 NOTE — DISCHARGE INSTRUCTIONS
Discharge Instructions after Total Knee Revision  Simba Gray MD  Direct Office phone number: [phone] to leave voicemail  After hours (weekdays after 5PM or on weekends): Please call [phone], and follow the prompts to reach the on call provider        Activity:  You may put full weight on your operated leg unless otherwise instructed  Work on getting the knee all the way straight, starting to bend it, and lifting it straight up off the bed.  Walk with a walker or two crutches for 2 weeks after surgery, then plan to go to a single point cane or single crutch for 2 weeks after that. The reason to walk with an assistive device is to avoid losing your balance and falling  Elevate your operated extremity (\"toes above nose\") throughout the day to decrease swelling and pain  Ice your operated knee multiple (3-4) times a day to decrease swelling and pain. Use a bag of ice or frozen vegetables inside a towel, placed onto the skin. This should be done for about 20-30 minutes at a time, with at least 20-30 minutes before the next icing session  Unless otherwise indicated, we will plan on starting physical therapy around 5-7 days after your surgery. You likely have been given a prescription for PT before the surgery or at the time of discharge from the hospital. If you did not, please contact our office.  Make sure you are doing PT as prescribed and doing home exercises to accelerate your rehabilitation    Physical Therapy:  You should typically begin physical therapy within 5-7 days after discharge from the hospital.   For the first 5-7 days after surgery, the initial goal is to be able to get the knee all the way straight and to begin bending. We want the inflammation from the surgery to decrease initially  Physical therapy is critical to success after your operation. You should have a prescription for PT that details the goals that I have for the physical therapist after surgery.   You should be attending PT

## 2025-03-19 ENCOUNTER — ANESTHESIA (OUTPATIENT)
Facility: HOSPITAL | Age: 70
DRG: 468 | End: 2025-03-19
Payer: MEDICARE

## 2025-03-19 ENCOUNTER — APPOINTMENT (OUTPATIENT)
Facility: HOSPITAL | Age: 70
DRG: 468 | End: 2025-03-19
Attending: ORTHOPAEDIC SURGERY
Payer: MEDICARE

## 2025-03-19 ENCOUNTER — HOSPITAL ENCOUNTER (INPATIENT)
Facility: HOSPITAL | Age: 70
LOS: 1 days | Discharge: HOME | DRG: 468 | End: 2025-03-19
Attending: ORTHOPAEDIC SURGERY | Admitting: ORTHOPAEDIC SURGERY
Payer: MEDICARE

## 2025-03-19 VITALS
SYSTOLIC BLOOD PRESSURE: 134 MMHG | HEIGHT: 67 IN | RESPIRATION RATE: 14 BRPM | DIASTOLIC BLOOD PRESSURE: 72 MMHG | TEMPERATURE: 97.6 F | HEART RATE: 73 BPM | WEIGHT: 198.19 LBS | BODY MASS INDEX: 31.11 KG/M2 | OXYGEN SATURATION: 92 %

## 2025-03-19 DIAGNOSIS — Z96.659 FAILED TOTAL KNEE ARTHROPLASTY, INITIAL ENCOUNTER: Primary | ICD-10-CM

## 2025-03-19 DIAGNOSIS — T84.018A FAILED TOTAL KNEE ARTHROPLASTY, INITIAL ENCOUNTER: Primary | ICD-10-CM

## 2025-03-19 PROCEDURE — 6360000002 HC RX W HCPCS: Performed by: ANESTHESIOLOGY

## 2025-03-19 PROCEDURE — 6360000002 HC RX W HCPCS: Performed by: STUDENT IN AN ORGANIZED HEALTH CARE EDUCATION/TRAINING PROGRAM

## 2025-03-19 PROCEDURE — 2709999900 HC NON-CHARGEABLE SUPPLY: Performed by: ORTHOPAEDIC SURGERY

## 2025-03-19 PROCEDURE — 2500000003 HC RX 250 WO HCPCS: Performed by: STUDENT IN AN ORGANIZED HEALTH CARE EDUCATION/TRAINING PROGRAM

## 2025-03-19 PROCEDURE — 3700000000 HC ANESTHESIA ATTENDED CARE: Performed by: ORTHOPAEDIC SURGERY

## 2025-03-19 PROCEDURE — 3E0T3BZ INTRODUCTION OF ANESTHETIC AGENT INTO PERIPHERAL NERVES AND PLEXI, PERCUTANEOUS APPROACH: ICD-10-PCS | Performed by: ORTHOPAEDIC SURGERY

## 2025-03-19 PROCEDURE — 2500000003 HC RX 250 WO HCPCS: Performed by: ORTHOPAEDIC SURGERY

## 2025-03-19 PROCEDURE — 0SPC0JZ REMOVAL OF SYNTHETIC SUBSTITUTE FROM RIGHT KNEE JOINT, OPEN APPROACH: ICD-10-PCS | Performed by: ORTHOPAEDIC SURGERY

## 2025-03-19 PROCEDURE — 7100000011 HC PHASE II RECOVERY - ADDTL 15 MIN: Performed by: ORTHOPAEDIC SURGERY

## 2025-03-19 PROCEDURE — 2720000010 HC SURG SUPPLY STERILE: Performed by: ORTHOPAEDIC SURGERY

## 2025-03-19 PROCEDURE — 6370000000 HC RX 637 (ALT 250 FOR IP): Performed by: ANESTHESIOLOGY

## 2025-03-19 PROCEDURE — 6360000002 HC RX W HCPCS: Performed by: ORTHOPAEDIC SURGERY

## 2025-03-19 PROCEDURE — 3600000015 HC SURGERY LEVEL 5 ADDTL 15MIN: Performed by: ORTHOPAEDIC SURGERY

## 2025-03-19 PROCEDURE — 1100000000 HC RM PRIVATE

## 2025-03-19 PROCEDURE — 0SRC0J9 REPLACEMENT OF RIGHT KNEE JOINT WITH SYNTHETIC SUBSTITUTE, CEMENTED, OPEN APPROACH: ICD-10-PCS | Performed by: ORTHOPAEDIC SURGERY

## 2025-03-19 PROCEDURE — 3600000005 HC SURGERY LEVEL 5 BASE: Performed by: ORTHOPAEDIC SURGERY

## 2025-03-19 PROCEDURE — 2580000003 HC RX 258: Performed by: ANESTHESIOLOGY

## 2025-03-19 PROCEDURE — 97161 PT EVAL LOW COMPLEX 20 MIN: CPT

## 2025-03-19 PROCEDURE — C1776 JOINT DEVICE (IMPLANTABLE): HCPCS | Performed by: ORTHOPAEDIC SURGERY

## 2025-03-19 PROCEDURE — 6370000000 HC RX 637 (ALT 250 FOR IP): Performed by: ORTHOPAEDIC SURGERY

## 2025-03-19 PROCEDURE — 97116 GAIT TRAINING THERAPY: CPT

## 2025-03-19 PROCEDURE — 7100000001 HC PACU RECOVERY - ADDTL 15 MIN: Performed by: ORTHOPAEDIC SURGERY

## 2025-03-19 PROCEDURE — C1713 ANCHOR/SCREW BN/BN,TIS/BN: HCPCS | Performed by: ORTHOPAEDIC SURGERY

## 2025-03-19 PROCEDURE — 73560 X-RAY EXAM OF KNEE 1 OR 2: CPT

## 2025-03-19 PROCEDURE — 3700000001 HC ADD 15 MINUTES (ANESTHESIA): Performed by: ORTHOPAEDIC SURGERY

## 2025-03-19 PROCEDURE — 2580000003 HC RX 258: Performed by: STUDENT IN AN ORGANIZED HEALTH CARE EDUCATION/TRAINING PROGRAM

## 2025-03-19 PROCEDURE — 7100000010 HC PHASE II RECOVERY - FIRST 15 MIN: Performed by: ORTHOPAEDIC SURGERY

## 2025-03-19 PROCEDURE — 7100000000 HC PACU RECOVERY - FIRST 15 MIN: Performed by: ORTHOPAEDIC SURGERY

## 2025-03-19 PROCEDURE — 64445 NJX AA&/STRD SCIATIC NRV IMG: CPT | Performed by: ANESTHESIOLOGY

## 2025-03-19 DEVICE — ATTUNE KNEE SYSTEM REVISION CRS FEMORAL CEMENTED RIGHT SIZE 6
Type: IMPLANTABLE DEVICE | Site: KNEE | Status: FUNCTIONAL
Brand: ATTUNE

## 2025-03-19 DEVICE — ATTUNE KNEE SYSTEM REVISION CEMENTED STEM CEMENTED 14X80MM
Type: IMPLANTABLE DEVICE | Site: KNEE | Status: FUNCTIONAL
Brand: ATTUNE

## 2025-03-19 DEVICE — ATTUNE KNEE SYSTEM REVISION CRS ROTATING PLATFORM INSERT AOX SIZE 6 6MM
Type: IMPLANTABLE DEVICE | Site: KNEE | Status: FUNCTIONAL
Brand: ATTUNE

## 2025-03-19 DEVICE — ATTUNE KNEE SYSTEM REVISION ROTATING PLATFORM TIBIAL BASE CEMENTED SIZE 4
Type: IMPLANTABLE DEVICE | Site: KNEE | Status: FUNCTIONAL
Brand: ATTUNE

## 2025-03-19 DEVICE — CEMENT BNE 40GM FULL DOSE PMMA W/ GENT HI VISC SIMPLEX P 10: Type: IMPLANTABLE DEVICE | Site: KNEE | Status: FUNCTIONAL

## 2025-03-19 DEVICE — ATTUNE KNEE SYSTEM REVISION POSTERIOR FEMORAL AUGMENT 4MM CEMENTED SIZE 6
Type: IMPLANTABLE DEVICE | Site: KNEE | Status: FUNCTIONAL
Brand: ATTUNE

## 2025-03-19 RX ORDER — SODIUM CHLORIDE 9 MG/ML
INJECTION, SOLUTION INTRAVENOUS
Status: DISCONTINUED | OUTPATIENT
Start: 2025-03-19 | End: 2025-03-19 | Stop reason: SDUPTHER

## 2025-03-19 RX ORDER — NALOXONE HYDROCHLORIDE 0.4 MG/ML
INJECTION, SOLUTION INTRAMUSCULAR; INTRAVENOUS; SUBCUTANEOUS PRN
Status: DISCONTINUED | OUTPATIENT
Start: 2025-03-19 | End: 2025-03-19 | Stop reason: HOSPADM

## 2025-03-19 RX ORDER — LIDOCAINE HYDROCHLORIDE 10 MG/ML
1 INJECTION, SOLUTION EPIDURAL; INFILTRATION; INTRACAUDAL; PERINEURAL
Status: DISCONTINUED | OUTPATIENT
Start: 2025-03-19 | End: 2025-03-19 | Stop reason: HOSPADM

## 2025-03-19 RX ORDER — SODIUM CHLORIDE 9 MG/ML
INJECTION, SOLUTION INTRAVENOUS CONTINUOUS
Status: DISCONTINUED | OUTPATIENT
Start: 2025-03-19 | End: 2025-03-19 | Stop reason: HOSPADM

## 2025-03-19 RX ORDER — ONDANSETRON 4 MG/1
4 TABLET, ORALLY DISINTEGRATING ORAL EVERY 8 HOURS PRN
Qty: 10 TABLET | Refills: 0 | Status: SHIPPED | OUTPATIENT
Start: 2025-03-19

## 2025-03-19 RX ORDER — ONDANSETRON 2 MG/ML
INJECTION INTRAMUSCULAR; INTRAVENOUS
Status: DISCONTINUED | OUTPATIENT
Start: 2025-03-19 | End: 2025-03-19 | Stop reason: SDUPTHER

## 2025-03-19 RX ORDER — DEXAMETHASONE SODIUM PHOSPHATE 4 MG/ML
INJECTION, SOLUTION INTRA-ARTICULAR; INTRALESIONAL; INTRAMUSCULAR; INTRAVENOUS; SOFT TISSUE
Status: DISCONTINUED | OUTPATIENT
Start: 2025-03-19 | End: 2025-03-19 | Stop reason: SDUPTHER

## 2025-03-19 RX ORDER — BUPIVACAINE HYDROCHLORIDE 2.5 MG/ML
INJECTION, SOLUTION EPIDURAL; INFILTRATION; INTRACAUDAL; PERINEURAL
Status: DISCONTINUED | OUTPATIENT
Start: 2025-03-19 | End: 2025-03-19 | Stop reason: SDUPTHER

## 2025-03-19 RX ORDER — DIPHENHYDRAMINE HYDROCHLORIDE 50 MG/ML
12.5 INJECTION, SOLUTION INTRAMUSCULAR; INTRAVENOUS
Status: DISCONTINUED | OUTPATIENT
Start: 2025-03-19 | End: 2025-03-19 | Stop reason: HOSPADM

## 2025-03-19 RX ORDER — CEFADROXIL 500 MG/1
500 CAPSULE ORAL 2 TIMES DAILY
Qty: 28 CAPSULE | Refills: 0 | Status: SHIPPED | OUTPATIENT
Start: 2025-03-19 | End: 2025-04-02

## 2025-03-19 RX ORDER — TRANEXAMIC ACID 100 MG/ML
INJECTION, SOLUTION INTRAVENOUS
Status: DISCONTINUED | OUTPATIENT
Start: 2025-03-19 | End: 2025-03-19 | Stop reason: SDUPTHER

## 2025-03-19 RX ORDER — SENNOSIDES 8.6 MG
650 CAPSULE ORAL EVERY 8 HOURS PRN
Qty: 30 TABLET | Refills: 0 | Status: SHIPPED | OUTPATIENT
Start: 2025-03-19 | End: 2025-04-18

## 2025-03-19 RX ORDER — MELOXICAM 15 MG/1
15 TABLET ORAL DAILY
Qty: 30 TABLET | Refills: 0 | Status: SHIPPED | OUTPATIENT
Start: 2025-03-19 | End: 2025-04-18

## 2025-03-19 RX ORDER — POLYETHYLENE GLYCOL 3350 17 G/17G
17 POWDER, FOR SOLUTION ORAL DAILY PRN
Qty: 14 PACKET | Refills: 0 | Status: SHIPPED | OUTPATIENT
Start: 2025-03-19 | End: 2025-04-02

## 2025-03-19 RX ORDER — KETOROLAC TROMETHAMINE 15 MG/ML
15 INJECTION, SOLUTION INTRAMUSCULAR; INTRAVENOUS
Status: DISCONTINUED | OUTPATIENT
Start: 2025-03-19 | End: 2025-03-19 | Stop reason: HOSPADM

## 2025-03-19 RX ORDER — VANCOMYCIN HYDROCHLORIDE 1 G/20ML
INJECTION, POWDER, LYOPHILIZED, FOR SOLUTION INTRAVENOUS PRN
Status: DISCONTINUED | OUTPATIENT
Start: 2025-03-19 | End: 2025-03-19 | Stop reason: ALTCHOICE

## 2025-03-19 RX ORDER — FENTANYL CITRATE 50 UG/ML
100 INJECTION, SOLUTION INTRAMUSCULAR; INTRAVENOUS
Status: DISCONTINUED | OUTPATIENT
Start: 2025-03-19 | End: 2025-03-19 | Stop reason: HOSPADM

## 2025-03-19 RX ORDER — PROPOFOL 10 MG/ML
INJECTION, EMULSION INTRAVENOUS
Status: DISCONTINUED | OUTPATIENT
Start: 2025-03-19 | End: 2025-03-19 | Stop reason: SDUPTHER

## 2025-03-19 RX ORDER — MIDAZOLAM HYDROCHLORIDE 1 MG/ML
INJECTION, SOLUTION INTRAMUSCULAR; INTRAVENOUS
Status: DISCONTINUED | OUTPATIENT
Start: 2025-03-19 | End: 2025-03-19 | Stop reason: SDUPTHER

## 2025-03-19 RX ORDER — FENTANYL CITRATE 50 UG/ML
INJECTION, SOLUTION INTRAMUSCULAR; INTRAVENOUS
Status: DISCONTINUED | OUTPATIENT
Start: 2025-03-19 | End: 2025-03-19 | Stop reason: SDUPTHER

## 2025-03-19 RX ORDER — OXYCODONE HYDROCHLORIDE 5 MG/1
10 TABLET ORAL
Status: DISCONTINUED | OUTPATIENT
Start: 2025-03-19 | End: 2025-03-19 | Stop reason: HOSPADM

## 2025-03-19 RX ORDER — CELECOXIB 100 MG/1
100 CAPSULE ORAL ONCE
Status: COMPLETED | OUTPATIENT
Start: 2025-03-19 | End: 2025-03-19

## 2025-03-19 RX ORDER — SODIUM CHLORIDE, SODIUM LACTATE, POTASSIUM CHLORIDE, CALCIUM CHLORIDE 600; 310; 30; 20 MG/100ML; MG/100ML; MG/100ML; MG/100ML
INJECTION, SOLUTION INTRAVENOUS
Status: DISCONTINUED | OUTPATIENT
Start: 2025-03-19 | End: 2025-03-19 | Stop reason: SDUPTHER

## 2025-03-19 RX ORDER — ACETAMINOPHEN 325 MG/1
975 TABLET ORAL ONCE
Status: COMPLETED | OUTPATIENT
Start: 2025-03-19 | End: 2025-03-19

## 2025-03-19 RX ORDER — ASPIRIN 81 MG/1
81 TABLET, CHEWABLE ORAL 2 TIMES DAILY WITH MEALS
Qty: 60 TABLET | Refills: 0 | Status: SHIPPED | OUTPATIENT
Start: 2025-03-19 | End: 2025-04-18

## 2025-03-19 RX ORDER — MIDAZOLAM HYDROCHLORIDE 2 MG/2ML
2 INJECTION, SOLUTION INTRAMUSCULAR; INTRAVENOUS
Status: DISCONTINUED | OUTPATIENT
Start: 2025-03-19 | End: 2025-03-19 | Stop reason: HOSPADM

## 2025-03-19 RX ORDER — BUPIVACAINE HYDROCHLORIDE 5 MG/ML
INJECTION, SOLUTION EPIDURAL; INTRACAUDAL; PERINEURAL
Status: DISCONTINUED | OUTPATIENT
Start: 2025-03-19 | End: 2025-03-19 | Stop reason: SDUPTHER

## 2025-03-19 RX ORDER — OXYCODONE HYDROCHLORIDE 5 MG/1
5 TABLET ORAL EVERY 4 HOURS PRN
Qty: 42 TABLET | Refills: 0 | Status: SHIPPED | OUTPATIENT
Start: 2025-03-19 | End: 2025-03-26

## 2025-03-19 RX ORDER — SODIUM CHLORIDE, SODIUM LACTATE, POTASSIUM CHLORIDE, CALCIUM CHLORIDE 600; 310; 30; 20 MG/100ML; MG/100ML; MG/100ML; MG/100ML
INJECTION, SOLUTION INTRAVENOUS CONTINUOUS
Status: DISCONTINUED | OUTPATIENT
Start: 2025-03-19 | End: 2025-03-19 | Stop reason: HOSPADM

## 2025-03-19 RX ORDER — OXYCODONE HCL 10 MG/1
10 TABLET, FILM COATED, EXTENDED RELEASE ORAL ONCE
Status: COMPLETED | OUTPATIENT
Start: 2025-03-19 | End: 2025-03-19

## 2025-03-19 RX ORDER — FENTANYL CITRATE 50 UG/ML
25 INJECTION, SOLUTION INTRAMUSCULAR; INTRAVENOUS EVERY 5 MIN PRN
Status: DISCONTINUED | OUTPATIENT
Start: 2025-03-19 | End: 2025-03-19 | Stop reason: HOSPADM

## 2025-03-19 RX ORDER — ONDANSETRON 2 MG/ML
4 INJECTION INTRAMUSCULAR; INTRAVENOUS
Status: DISCONTINUED | OUTPATIENT
Start: 2025-03-19 | End: 2025-03-19 | Stop reason: HOSPADM

## 2025-03-19 RX ADMIN — BUPIVACAINE HYDROCHLORIDE 20 ML: 2.5 INJECTION, SOLUTION EPIDURAL; INFILTRATION; INTRACAUDAL; PERINEURAL at 10:48

## 2025-03-19 RX ADMIN — ONDANSETRON 4 MG: 2 INJECTION, SOLUTION INTRAMUSCULAR; INTRAVENOUS at 12:18

## 2025-03-19 RX ADMIN — PROPOFOL 30 MG: 10 INJECTION, EMULSION INTRAVENOUS at 12:01

## 2025-03-19 RX ADMIN — OXYCODONE HYDROCHLORIDE 10 MG: 10 TABLET, FILM COATED, EXTENDED RELEASE ORAL at 10:00

## 2025-03-19 RX ADMIN — WATER 2000 MG: 1 INJECTION INTRAMUSCULAR; INTRAVENOUS; SUBCUTANEOUS at 16:51

## 2025-03-19 RX ADMIN — MIDAZOLAM HYDROCHLORIDE 2 MG: 1 INJECTION, SOLUTION INTRAMUSCULAR; INTRAVENOUS at 10:43

## 2025-03-19 RX ADMIN — BUPIVACAINE HYDROCHLORIDE 2.4 ML: 5 INJECTION, SOLUTION EPIDURAL; INTRACAUDAL; PERINEURAL at 11:37

## 2025-03-19 RX ADMIN — CELECOXIB 100 MG: 100 CAPSULE ORAL at 10:00

## 2025-03-19 RX ADMIN — DEXAMETHASONE SODIUM PHOSPHATE 4 MG: 4 INJECTION INTRA-ARTICULAR; INTRALESIONAL; INTRAMUSCULAR; INTRAVENOUS; SOFT TISSUE at 12:18

## 2025-03-19 RX ADMIN — WATER 2000 MG: 1 INJECTION INTRAMUSCULAR; INTRAVENOUS; SUBCUTANEOUS at 12:08

## 2025-03-19 RX ADMIN — FENTANYL CITRATE 100 MCG: 50 INJECTION, SOLUTION INTRAMUSCULAR; INTRAVENOUS at 10:43

## 2025-03-19 RX ADMIN — SODIUM CHLORIDE: 0.9 INJECTION, SOLUTION INTRAVENOUS at 10:06

## 2025-03-19 RX ADMIN — HYDROMORPHONE HYDROCHLORIDE 0.5 MG: 1 INJECTION, SOLUTION INTRAMUSCULAR; INTRAVENOUS; SUBCUTANEOUS at 16:29

## 2025-03-19 RX ADMIN — PROPOFOL 50 MCG/KG/MIN: 10 INJECTION, EMULSION INTRAVENOUS at 11:58

## 2025-03-19 RX ADMIN — BUPIVACAINE HYDROCHLORIDE 20 ML: 2.5 INJECTION, SOLUTION EPIDURAL; INFILTRATION; INTRACAUDAL; PERINEURAL at 10:53

## 2025-03-19 RX ADMIN — SODIUM CHLORIDE, POTASSIUM CHLORIDE, SODIUM LACTATE AND CALCIUM CHLORIDE: 600; 310; 30; 20 INJECTION, SOLUTION INTRAVENOUS at 12:25

## 2025-03-19 RX ADMIN — TRANEXAMIC ACID 1000 MG: 100 INJECTION INTRAVENOUS at 12:08

## 2025-03-19 RX ADMIN — SODIUM CHLORIDE: 9 INJECTION, SOLUTION INTRAVENOUS at 11:28

## 2025-03-19 RX ADMIN — ACETAMINOPHEN 975 MG: 325 TABLET ORAL at 10:00

## 2025-03-19 RX ADMIN — TRANEXAMIC ACID 1000 MG: 100 INJECTION INTRAVENOUS at 14:14

## 2025-03-19 RX ADMIN — PROPOFOL 30 MG: 10 INJECTION, EMULSION INTRAVENOUS at 11:59

## 2025-03-19 ASSESSMENT — PAIN - FUNCTIONAL ASSESSMENT: PAIN_FUNCTIONAL_ASSESSMENT: ACTIVITIES ARE NOT PREVENTED

## 2025-03-19 ASSESSMENT — PAIN DESCRIPTION - FREQUENCY: FREQUENCY: INTERMITTENT

## 2025-03-19 ASSESSMENT — PAIN DESCRIPTION - ORIENTATION
ORIENTATION: RIGHT
ORIENTATION: RIGHT

## 2025-03-19 ASSESSMENT — PAIN DESCRIPTION - ONSET: ONSET: ON-GOING

## 2025-03-19 ASSESSMENT — PAIN DESCRIPTION - LOCATION
LOCATION: KNEE
LOCATION: KNEE

## 2025-03-19 ASSESSMENT — PAIN SCALES - GENERAL
PAINLEVEL_OUTOF10: 3
PAINLEVEL_OUTOF10: 4
PAINLEVEL_OUTOF10: 6

## 2025-03-19 ASSESSMENT — PAIN DESCRIPTION - DESCRIPTORS
DESCRIPTORS: ACHING
DESCRIPTORS: THROBBING

## 2025-03-19 ASSESSMENT — PAIN DESCRIPTION - PAIN TYPE: TYPE: CHRONIC PAIN

## 2025-03-19 NOTE — PROGRESS NOTES
PHYSICAL THERAPY EVALUATION/DISCHARGE    Patient: Aylin Hanna (69 y.o. female)  Date: 3/19/2025  Primary Diagnosis: Mechanical loosening of prosthetic knee, initial encounter [T84.038A, Z96.659]  Total knee replacement status, right [Z96.651]  Failed total knee arthroplasty, initial encounter [T84.018A, Z96.659]  Procedure(s) (LRB):  RIGHT REVISION TOTAL KNEE REPLACEMENT (SPINAL WITH REGIONAL BLOCK) (Right) * Day of Surgery *   Precautions: Weight Bearing Right Lower Extremity Weight Bearing: Weight Bearing As Tolerated                    ASSESSMENT AND RECOMMENDATIONS:  Based on the objective data below, the patient presents with safe supervised mobility following admission for a right TKA revision. Pain and BP were well controlled to allow for activity progression. Gait training completed x50' using a RW and requiring SBA. She transitioned from a step to pattern to a reciprocal step through pattern. She safely ascended/descended 4 stairs with 1 rail. Education provided regarding initial HEP and encouraged progressive mobility as pain allows with verbalized understanding. She is mobilizing well and clear for discharge home.    Further skilled acute physical therapy is not indicated at this time.       PLAN :  Recommendation for discharge: (in order for the patient to meet his/her long term goals):  Outpatient physical therapy for post right TKA revision  Other factors to consider for discharge: no additional factors    IF patient discharges home will need the following DME: none       SUBJECTIVE:   Patient stated “I've done more now than I did after my other one.”    OBJECTIVE DATA SUMMARY:     Past Medical History:   Diagnosis Date    Anxiety     Arthritis     OSTEO-KNEES lower lback, and hip area    Dermatitis     atypical eczema    Fibrocystic breast     dense    GERD (gastroesophageal reflux disease)     Hemorrhoid     History of kidney stones     Last times was circa 2004.    History of vertigo     HTN

## 2025-03-19 NOTE — ANESTHESIA PROCEDURE NOTES
Peripheral Block    Patient location during procedure: pre-op  Reason for block: procedure for pain, post-op pain management, primary anesthetic and at surgeon's request  Start time: 3/19/2025 10:43 AM  End time: 3/19/2025 10:52 AM  Staffing  Performed: anesthesiologist   Anesthesiologist: Angel Gutierrez MD  Performed by: Angel Gutierrez MD  Authorized by: Angel Gutierrez MD    Preanesthetic Checklist  Completed: patient identified, IV checked, site marked, risks and benefits discussed, surgical/procedural consents, timeout performed, anesthesia consent given, oxygen available and monitors applied/VS acknowledged  Peripheral Block   Patient position: supine  Prep: ChloraPrep  Provider prep: mask and sterile gloves  Patient monitoring: cardiac monitor, continuous pulse ox, continuous capnometry, frequent blood pressure checks, IV access, oxygen and responsive to questions  Block type: Sciatic and Femoral  Laterality: right  Injection technique: single-shot  Guidance: ultrasound guided    Needle   Needle type: Other   Needle gauge: 21 G  Needle localization: ultrasound guidance  Needle length: 10 cmOther needle type: STIMUPLEX  Assessment   Injection assessment: negative aspiration for heme, no paresthesia on injection, local visualized surrounding nerve on ultrasound and no intravascular symptoms  Hemodynamics: stable  Outcomes: patient tolerated procedure well    Additional Notes  IPACK block performed with landmark identification. Needle used was 4\" stimuplex 21g 20cc 0.25% bupivacaine injected intermittently with negative aspiration.     Alie CHU witnessed timeout and block written on correct side.

## 2025-03-19 NOTE — ANESTHESIA PRE PROCEDURE
Department of Anesthesiology  Preprocedure Note       Name:  Aylin Hanna   Age:  69 y.o.  :  1955                                          MRN:  [id number]         Date:  3/19/2025      Surgeon: Surgeon(s):  Simba Gray MD    Procedure: Procedure(s):  RIGHT REVISION TOTAL KNEE REPLACEMENT (SPINAL WITH REGIONAL BLOCK)    Medications prior to admission:   Prior to Admission medications    Medication Sig Start Date End Date Taking? Authorizing Provider   NONFORMULARY daily Airborne   B complex daily  Collagen 2 tbsp daily  Probiotic daily  Citrus bergamot daily  Magnesium citrate 250 mg twice weekly   Yes Brandon Bansal MD   Multiple Vitamins-Minerals (PRESERVISION AREDS 2 PO) Take 1 tablet by mouth in the morning and at bedtime   Yes Brandon Bansal MD   acetaminophen (TYLENOL) 325 MG tablet Take 2 tablets by mouth every 6 hours as needed for Pain   Yes Brandon Bansal MD   traMADol (ULTRAM) 50 MG tablet Take 1 tablet by mouth every 6 hours as needed for Pain.   Yes Brandon Bansal MD   hydroCHLOROthiazide (MICROZIDE) 12.5 MG capsule Take 1 capsule by mouth daily   Yes Brandon Bansal MD   omeprazole (PRILOSEC) 20 MG delayed release capsule Take 2 capsules by mouth as needed   Yes ProviderBrandon MD   azelastine (ASTELIN) 0.1 % nasal spray Use in each nostril as directed 21  Yes Automatic Reconciliation, Ar   fluticasone (FLONASE) 50 MCG/ACT nasal spray 2 sprays by Nasal route daily 19  Yes Automatic Reconciliation, Ar       Current medications:    Current Facility-Administered Medications   Medication Dose Route Frequency Provider Last Rate Last Admin   • lidocaine PF 1 % injection 1 mL  1 mL IntraDERmal Once PRN Andi Jj MD       • fentaNYL (SUBLIMAZE) injection 100 mcg  100 mcg IntraVENous Once PRN Andi Jj MD       • 0.9 % sodium chloride infusion   IntraVENous Continuous Andi Jj  mL/hr at 25 1006 New

## 2025-03-19 NOTE — OP NOTE
OPERATIVE REPORT    FACILITY: [facility]    PATIENT NAME: Aylin Hanna     DATE OF OPERATION: 3/19/2025    PREOPERATIVE DIAGNOSIS: Failed right total knee arthroplasty    POSTOPERATIVE DIAGNOSIS: same    OPERATIVE PROCEDURE:   1. Revision right total knee arthroplasty, both components,  CPT 64129    ATTENDING PHYSICIAN: Simba Gray MD    ASSISTANT: ANASTACIO Kc    JUSTIFICATION FOR SURGICAL ASSISTANT:   Please note that the surgical assistant listed above (ANASTACIO Kc) was required and necessary for the completion of this case to assist with limb positioning, soft tissue retraction, equipment management, implant insertion, and wound closure.     IMPLANTS:    Implant Name Type Inv. Item Serial No.  Lot No. LRB No. Used Action   CEMENT BNE 40GM FULL DOSE PMMA W/ GENT HI VISC SIMPLEX P 10 - SNA  CEMENT BNE 40GM FULL DOSE PMMA W/ GENT HI VISC SIMPLEX P 10 NA [employer] [facility] [id number] Right 3 Implanted   STEM FEM 76B00DS BRENDAN ATTUNE - [id number]  STEM FEM 14E11CK BRENDAN ATTUNE  [facility] [id number] Right 1 Implanted   COMPONENT FEM AUG REV 6 4 MM POST ATTUNE - [id number]  COMPONENT FEM AUG REV 6 4 MM POST ATTUNE  [facility] [id number] Right 1 Implanted   COMPONENT FEM AUG REV 6 4 MM POST ATTUNE - [id number]  COMPONENT FEM AUG REV 6 4 MM POST ATTUNE  [facility] [id number] Right 1 Implanted   BASEPLATE TIB SZ 4 ROT PLATFRM CO CHROM MOLYBDENUM TI ALLY - [id number]  BASEPLATE TIB SZ 4 ROT PLATFRM CO CHROM MOLYBDENUM TI ALLY  [facility] [id number] Right 1 Implanted   COMPONENT FEM SZ 6 R KNEE CO CHROM MOLYBDENUM BRENDAN W/ ASYM - [id number]  COMPONENT FEM SZ 6 R KNEE CO CHROM MOLYBDENUM BRENDAN W/ ASYM  [facility] [id number] Right 1 Implanted   INSERT TIB SZ 6 THK6MM ROT PLATFRM KNEE UHMWPE ANTIOXIDANT [id number]] [facility]] - [id number]  INSERT TIB SZ 6 THK6MM ROT PLATFRM KNEE

## 2025-03-19 NOTE — ANESTHESIA POSTPROCEDURE EVALUATION
Department of Anesthesiology  Postprocedure Note    Patient: Aylin Hanna  MRN: [id number]  YOB: 1955  Date of evaluation: 3/19/2025    Procedure Summary       Date: 03/19/25 Room / Location: [facility] MAIN OR  / [facility] MAIN OR    Anesthesia Start: 1153 Anesthesia Stop: 1454    Procedure: RIGHT REVISION TOTAL KNEE REPLACEMENT (SPINAL WITH REGIONAL BLOCK) (Right: Knee) Diagnosis:       Mechanical loosening of prosthetic knee, initial encounter      Total knee replacement status, right      (Mechanical loosening of prosthetic knee, initial encounter [T84.038A, Z96.659])      (Total knee replacement status, right [Z96.651])    Surgeons: Simba Gray MD Responsible Provider: Dakota Moreland MD    Anesthesia Type: spinal ASA Status: 3            Anesthesia Type: No value filed.    Marlys Phase I: Marlys Score: 10    Marlys Phase II:      Anesthesia Post Evaluation    Patient location during evaluation: PACU  Patient participation: complete - patient participated  Level of consciousness: awake  Airway patency: patent  Nausea & Vomiting: no vomiting and no nausea  Cardiovascular status: hemodynamically stable  Respiratory status: acceptable  Hydration status: stable  Pain management: adequate    No notable events documented.

## 2025-03-19 NOTE — H&P
Orthopaedic PRE-OP Admission History and Physical        Subjective:   Patient is a 69 y.o.  female who presented for right knee pain.  The patient was evaluated and determined the most appropriate plan of care is to proceed with surgical intervention. Conservative measures were not indicated or successful.         Patient Active Problem List    Diagnosis Date Noted    Tumor of parotid gland 2021    Mass of right parotid gland 2021    Controlled substance agreement signed 2017    Advance directive discussed with patient 2017    Colon cancer screening 2014    Squamous cell carcinoma of foot 2014    Allergic rhinitis 2014    History of kidney stones 2012    Pap smear for cervical cancer screening 2012    History of screening mammography 2012    Fibrocystic breast 10/12/2011    Anxiety 10/12/2011    Hemorrhoid 10/12/2011     Past Medical History:   Diagnosis Date    Anxiety     Arthritis     OSTEO-KNEES lower lback, and hip area    Dermatitis     atypical eczema    Fibrocystic breast     dense    GERD (gastroesophageal reflux disease)     Hemorrhoid     History of kidney stones     Last times was circa .    History of vertigo     HTN (hypertension)     Lung nodule     incidental - due for 2nd CT    Osteoporosis     Thyroid nodule     TIA (transient ischemic attack) 2020      Past Surgical History:   Procedure Laterality Date    COLONOSCOPY      GYN       x2    ORTHOPEDIC SURGERY      cyst removed left 3rd digit - Dr Mullen    OTHER SURGICAL HISTORY  2014    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 ankle    OTHER SURGICAL HISTORY Left 10/28/2011    Incision and drainage of septoc left 3rd finger joint - catbite    OTHER SURGICAL HISTORY Right     right tumor removed from parotid gland    TOTAL KNEE ARTHROPLASTY Right       Prior to Admission medications    Medication Sig Start Date End Date Taking? Authorizing

## 2025-03-19 NOTE — BRIEF OP NOTE
Brief Postoperative Note      Patient: Aylin Hanna  YOB: 1955  MRN: [id number]    Date of Procedure: 3/19/2025    Pre-Op Diagnosis Codes:      * Mechanical loosening of prosthetic knee, initial encounter [T84.038A, Z96.659]     * Total knee replacement status, right [Z96.651]    Post-Op Diagnosis: Same       Procedure(s):  RIGHT REVISION TOTAL KNEE REPLACEMENT (SPINAL WITH REGIONAL BLOCK)    Surgeon(s):  Simba Gray MD    Assistant:  Surgical Assistant: Sherice Garcia  Physician Assistant: Husam Bhatt PA-C    Anesthesia: Spinal    Estimated Blood Loss (mL): 250cc    Complications: None    Specimens:   * No specimens in log *    Implants:  Implant Name Type Inv. Item Serial No.  Lot No. LRB No. Used Action   CEMENT BNE 40GM FULL DOSE PMMA W/ GENT HI VISC SIMPLEX P 10 - SNA  CEMENT BNE 40GM FULL DOSE PMMA W/ GENT HI VISC SIMPLEX P 10 NA [employer] [facility] [id number] Right 3 Implanted   STEM FEM 47Y33DN BRENDAN ATTUNE - [id number]  STEM FEM [id number] BRENDAN ATTUNE  [facility] [id number] Right 1 Implanted   COMPONENT FEM AUG REV 6 4 MM POST ATTUNE - [id number]  COMPONENT FEM AUG REV 6 4 MM POST ATTUNE  [facility] [id number] Right 1 Implanted   COMPONENT FEM AUG REV 6 4 MM POST ATTUNE - [id number]  COMPONENT FEM AUG REV 6 4 MM POST ATTUNE  [facility] [id number] Right 1 Implanted   BASEPLATE TIB SZ 4 ROT PLATFRM CO CHROM MOLYBDENUM TI ALLY - [id number]  BASEPLATE TIB SZ 4 ROT PLATFRM CO CHROM MOLYBDENUM TI ALLY  [facility] [id number] Right 1 Implanted   COMPONENT FEM SZ 6 R KNEE CO CHROM MOLYBDENUM BRENDAN W/ ASYM - [id number]  COMPONENT FEM SZ 6 R KNEE CO CHROM MOLYBDENUM BRENDAN W/ ASYM  [facility] [id number] Right 1 Implanted   INSERT TIB SZ 6 THK6MM ROT PLATFRM KNEE UHMWPE ANTIOXIDANT [id number]] [facility] [employer]] - [id number]  INSERT TIB SZ 6 THK6MM ROT PLATFRM KNEE

## 2025-03-19 NOTE — ANESTHESIA PROCEDURE NOTES
Spinal Block    Patient location during procedure: pre-op  End time: 3/19/2025 11:37 AM  Reason for block: post-op pain management, primary anesthetic and at surgeon's request  Staffing  Performed: anesthesiologist   Anesthesiologist: Angel Gutierrez MD  Performed by: Angel Gutierrez MD  Authorized by: Angel Gutierrez MD    Spinal Block  Patient position: sitting  Prep: DuraPrep  Patient monitoring: cardiac monitor, continuous pulse ox, continuous capnometry, frequent blood pressure checks and oxygen  Approach: midline  Location: L3/L4  Provider prep: mask and sterile gloves  Needle  Needle type: Pencan   Needle gauge: 25 G  Needle length: 3.5 in  Assessment  Swirl obtained: Yes  CSF: clear  Attempts: 1  Hemodynamics: stable  Preanesthetic Checklist  Completed: patient identified, IV checked, site marked, risks and benefits discussed, surgical/procedural consents, pre-op evaluation, timeout performed, anesthesia consent given, oxygen available and monitors applied/VS acknowledged

## 2025-06-30 ENCOUNTER — APPOINTMENT (OUTPATIENT)
Facility: HOSPITAL | Age: 70
End: 2025-06-30
Payer: MEDICARE

## 2025-06-30 ENCOUNTER — HOSPITAL ENCOUNTER (EMERGENCY)
Facility: HOSPITAL | Age: 70
Discharge: HOME | End: 2025-06-30
Attending: STUDENT IN AN ORGANIZED HEALTH CARE EDUCATION/TRAINING PROGRAM
Payer: MEDICARE

## 2025-06-30 VITALS
TEMPERATURE: 98.5 F | HEART RATE: 72 BPM | RESPIRATION RATE: 16 BRPM | WEIGHT: 194 LBS | DIASTOLIC BLOOD PRESSURE: 91 MMHG | OXYGEN SATURATION: 95 % | HEIGHT: 67 IN | BODY MASS INDEX: 30.45 KG/M2 | SYSTOLIC BLOOD PRESSURE: 147 MMHG

## 2025-06-30 DIAGNOSIS — J06.9 ACUTE UPPER RESPIRATORY INFECTION: Primary | ICD-10-CM

## 2025-06-30 LAB
ALBUMIN SERPL-MCNC: 3.5 G/DL (ref 3.5–5)
ALBUMIN/GLOB SERPL: 1 (ref 1.1–2.2)
ALP SERPL-CCNC: 94 U/L (ref 45–117)
ALT SERPL-CCNC: 27 U/L (ref 12–78)
ANION GAP SERPL CALC-SCNC: 10 MMOL/L (ref 2–12)
AST SERPL-CCNC: 25 U/L (ref 15–37)
BASOPHILS # BLD: 0 K/UL (ref 0–0.1)
BASOPHILS NFR BLD: 0 % (ref 0–1)
BILIRUB SERPL-MCNC: 0.3 MG/DL (ref 0.2–1)
BUN SERPL-MCNC: 14 MG/DL (ref 6–20)
BUN/CREAT SERPL: 18 (ref 12–20)
CALCIUM SERPL-MCNC: 9 MG/DL (ref 8.5–10.1)
CHLORIDE SERPL-SCNC: 98 MMOL/L (ref 97–108)
CO2 SERPL-SCNC: 29 MMOL/L (ref 21–32)
CREAT SERPL-MCNC: 0.77 MG/DL (ref 0.55–1.02)
DIFFERENTIAL METHOD BLD: NORMAL
EOSINOPHIL # BLD: 0.12 K/UL (ref 0–0.4)
EOSINOPHIL NFR BLD: 2 % (ref 0–7)
ERYTHROCYTE [DISTWIDTH] IN BLOOD BY AUTOMATED COUNT: 13 % (ref 11.5–14.5)
FLUAV RNA SPEC QL NAA+PROBE: NOT DETECTED
FLUBV RNA SPEC QL NAA+PROBE: NOT DETECTED
GLOBULIN SER CALC-MCNC: 3.6 G/DL (ref 2–4)
GLUCOSE SERPL-MCNC: 102 MG/DL (ref 65–100)
HCT VFR BLD AUTO: 39 % (ref 35–47)
HGB BLD-MCNC: 13.9 G/DL (ref 11.5–16)
IMM GRANULOCYTES # BLD AUTO: 0 K/UL
IMM GRANULOCYTES NFR BLD AUTO: 0 %
LYMPHOCYTES # BLD: 1.62 K/UL (ref 0.8–3.5)
LYMPHOCYTES NFR BLD: 28 % (ref 12–49)
MCH RBC QN AUTO: 32.2 PG (ref 26–34)
MCHC RBC AUTO-ENTMCNC: 35.6 G/DL (ref 30–36.5)
MCV RBC AUTO: 90.3 FL (ref 80–99)
MONOCYTES # BLD: 0.46 K/UL (ref 0–1)
MONOCYTES NFR BLD: 8 % (ref 5–13)
NEUTS SEG # BLD: 3.6 K/UL (ref 1.8–8)
NEUTS SEG NFR BLD: 62 % (ref 32–75)
NRBC # BLD: 0 K/UL (ref 0–0.01)
NRBC BLD-RTO: 0 PER 100 WBC
PLATELET # BLD AUTO: 255 K/UL (ref 150–400)
PMV BLD AUTO: 8.9 FL (ref 8.9–12.9)
POTASSIUM SERPL-SCNC: 3.4 MMOL/L (ref 3.5–5.1)
PROT SERPL-MCNC: 7.1 G/DL (ref 6.4–8.2)
RBC # BLD AUTO: 4.32 M/UL (ref 3.8–5.2)
RBC MORPH BLD: NORMAL
SARS-COV-2 RNA RESP QL NAA+PROBE: NOT DETECTED
SODIUM SERPL-SCNC: 137 MMOL/L (ref 136–145)
SOURCE: NORMAL
WBC # BLD AUTO: 5.8 K/UL (ref 3.6–11)

## 2025-06-30 PROCEDURE — 85025 COMPLETE CBC W/AUTO DIFF WBC: CPT

## 2025-06-30 PROCEDURE — 87636 SARSCOV2 & INF A&B AMP PRB: CPT

## 2025-06-30 PROCEDURE — 99284 EMERGENCY DEPT VISIT MOD MDM: CPT

## 2025-06-30 PROCEDURE — 71046 X-RAY EXAM CHEST 2 VIEWS: CPT

## 2025-06-30 PROCEDURE — 80053 COMPREHEN METABOLIC PANEL: CPT

## 2025-06-30 PROCEDURE — 36415 COLL VENOUS BLD VENIPUNCTURE: CPT

## 2025-06-30 RX ORDER — FLUTICASONE PROPIONATE 50 MCG
2 SPRAY, SUSPENSION (ML) NASAL DAILY
Qty: 16 G | Refills: 0 | Status: SHIPPED | OUTPATIENT
Start: 2025-06-30

## 2025-06-30 RX ORDER — GUAIFENESIN 600 MG/1
1200 TABLET, EXTENDED RELEASE ORAL 2 TIMES DAILY
Qty: 30 TABLET | Refills: 0 | Status: SHIPPED | OUTPATIENT
Start: 2025-06-30

## 2025-06-30 ASSESSMENT — LIFESTYLE VARIABLES
HOW OFTEN DO YOU HAVE A DRINK CONTAINING ALCOHOL: MONTHLY OR LESS
HOW MANY STANDARD DRINKS CONTAINING ALCOHOL DO YOU HAVE ON A TYPICAL DAY: 1 OR 2

## 2025-06-30 ASSESSMENT — PAIN - FUNCTIONAL ASSESSMENT: PAIN_FUNCTIONAL_ASSESSMENT: NONE - DENIES PAIN

## 2025-06-30 NOTE — ED PROVIDER NOTES
[facility]  EMERGENCY DEPARTMENT ENCOUNTER      Date: 2025  Patient Name: Aylin Hanna  MRN: [id number]  Birthdate 1955  Date of evaluation: 2025  Provider: CLARA Aaron NP   Note Started: 2:55 PM EDT 25    CHIEF COMPLAINT     Chief Complaint   Patient presents with    Fatigue    Cough    Back Pain       HISTORY OF PRESENT ILLNESS  (Onset, Location, Duration, Character, Alleviating/Aggravating, Radiation, Timing, Severity)   Note limiting factors.   History Provided By: Patient     HPI: Aylin Hanna is a 69 y.o. female with anxiety, arthritis, right knee surgery, GERD, hemorrhoids, kidney stones, HTN, lung nodules, sleep apnea, TIA presents with cough, congestion, fatigue, body aches.  Onset 4 days ago.  Seen by her PCP 3 days ago and told to take an OTC COVID test.  Home COVID test negative.  Unknown sick contacts.  She denies fevers, chest pain, respiratory distress, abdominal pain, nausea, vomiting, diarrhea, urinary symptoms, rash, sore throat.  Has used Afrin yesterday.      Nursing Notes and triage vitals were reviewed.  PCP: Shyanne Hernandez MD      PAST MEDICAL HISTORY   Past Medical History:  Past Medical History:   Diagnosis Date    Anxiety     Arthritis     OSTEO-KNEES lower lback, and hip area    Dermatitis     atypical eczema    Fibrocystic breast     dense    GERD (gastroesophageal reflux disease)     Hemorrhoid     History of kidney stones     Last times was circa .    History of vertigo     HTN (hypertension)     Lung nodule     incidental - due for 2nd CT    Osteoporosis     Sleep apnea     Uses CPAP    Thyroid nodule     TIA (transient ischemic attack) 2020       Past Surgical History:  Past Surgical History:   Procedure Laterality Date    COLONOSCOPY      GYN       x2    ORTHOPEDIC SURGERY      cyst removed left 3rd digit - Dr Mullen    OTHER SURGICAL HISTORY  2014    Excision of left lower extremity squamous

## 2025-06-30 NOTE — DISCHARGE INSTRUCTIONS
Thank you for allowing us to provide you with medical care today.  We realize that you have many choices for your emergency care needs.  We thank you for choosing [facility].  Please choose us in the future for any continued health care needs.     We hope we addressed all of your medical concerns. We strive to provide excellent quality care in the Emergency Department.  Anything less than excellent does not meet our expectations.     The exam and treatment you received in the Emergency Department were for an emergent problem and are not intended as complete care. It is important that you follow up with a doctor, nurse practitioner, or physician’s assistant for ongoing care. If your symptoms worsen or you do not improve as expected and you are unable to reach your usual health care provider, you should return to the Emergency Department. We are available 24 hours a day.     Take this sheet with you when you go to your follow-up visit.     If you have any problem arranging the follow-up visit, contact the Emergency Department immediately.     Make an appointment your family doctor for follow up of this visit. Return to the ER if you are unable to be seen in a timely manner.     Below is a summary of your results.    Labs  Recent Results (from the past 12 hours)   CBC with Auto Differential    Collection Time: 06/30/25  3:20 PM   Result Value Ref Range    WBC 5.8 3.6 - 11.0 K/uL    RBC 4.32 3.80 - 5.20 M/uL    Hemoglobin 13.9 11.5 - 16.0 g/dL    Hematocrit 39.0 35.0 - 47.0 %    MCV 90.3 80.0 - 99.0 FL    MCH 32.2 26.0 - 34.0 PG    MCHC 35.6 30.0 - 36.5 g/dL    RDW 13.0 11.5 - 14.5 %    Platelets 255 150 - 400 K/uL    MPV 8.9 8.9 - 12.9 FL    Nucleated RBCs 0.0 0.0  WBC    nRBC 0.00 0.00 - 0.01 K/uL    Neutrophils % 62 32 - 75 %    Lymphocytes % 28 12 - 49 %    Monocytes % 8 5 - 13 %    Eosinophils % 2 0 - 7 %    Basophils % 0 0 - 1 %    Immature Granulocytes % 0 %    Neutrophils Absolute

## 2025-06-30 NOTE — ED TRIAGE NOTES
Pt ambualtory to ED in NAD for fatigue, congestion and dry cough starting Thursday.  Reports back pain. Saw PCP Friday.     Negative home Covid test. Denies fevers.

## (undated) DEVICE — SOLUTION SCRB 1% POVIDONE IOD BRN ANTIMIC SKIN CLN

## (undated) DEVICE — SUTURE VICRYL + SZ 2-0 L18IN ABSRB UD CT1 L36MM 1/2 CIR VCP839D

## (undated) DEVICE — BIPOLAR FORCEPS CORD: Brand: VALLEYLAB

## (undated) DEVICE — REM POLYHESIVE ADULT PATIENT RETURN ELECTRODE: Brand: VALLEYLAB

## (undated) DEVICE — DRAIN SURG FLAT W7MMXL20CM FULL PERF

## (undated) DEVICE — INFECTION CONTROL KIT SYS

## (undated) DEVICE — INSULATED BLADE ELECTRODE: Brand: EDGE

## (undated) DEVICE — GOWN,AURORA,NON-REINFORCED,2XL: Brand: MEDLINE

## (undated) DEVICE — SUTURE ABSRB L30CM 2-0 VLT SPRL PDS + STRATAFIX SXPP1B410

## (undated) DEVICE — SOL IRR STRL H2O 1000ML BTL --

## (undated) DEVICE — BLUNTFILL: Brand: MONOJECT

## (undated) DEVICE — DRESSING ANTIMIC FOAM MEPILEX BORD POSTOP AG PROD SZ 4X12 IN

## (undated) DEVICE — HANDPIECE SET WITH BONE CLEANING TIP AND SUCTION TUBE: Brand: INTERPULSE

## (undated) DEVICE — Device

## (undated) DEVICE — TRAY PREP DRY W/ PREM GLV 2 APPL 6 SPNG 2 UNDPD 1 OVERWRAP

## (undated) DEVICE — SUTURE VCRL SZ 4-0 L27IN ABSRB VLT L26MM SH 1/2 CIR J315H

## (undated) DEVICE — DRAPE SURG KNEE 1 MIN SET ESYSUIT

## (undated) DEVICE — GLOVE ORTHO 7 1/2   MSG9475

## (undated) DEVICE — TOTAL JOINT-SFMC: Brand: MEDLINE INDUSTRIES, INC.

## (undated) DEVICE — SYRINGE MED 30ML STD CLR PLAS LUERLOCK TIP N CTRL DISP

## (undated) DEVICE — SPONGE GZ W4XL4IN COT 12 PLY TYP VII WVN C FLD DSGN STERILE

## (undated) DEVICE — SUTURE PERMAHAND SZ 2-0 L18IN NONABSORBABLE BLK L26MM PS 1588H

## (undated) DEVICE — SMOKE EVACUATION PENCIL: Brand: VALLEYLAB

## (undated) DEVICE — SUTURE MONOCRYL STRATAFIX SPRL + 3 0 SGL ARMED PS 1 24 IN LEN SXMP1B103

## (undated) DEVICE — SUTURE VICRYL + SZ 1 L27IN ABSRB UD CT-1 L36MM 1/2 CIR TAPR VCPP40D

## (undated) DEVICE — SURGICAL PROCEDURE PACK BASIN MAJ SET CUST NO CAUT

## (undated) DEVICE — CEMENT MIXING SYSTEM WITH FEMORAL BREAKWAY NOZZLE: Brand: REVOLUTION

## (undated) DEVICE — GLOVE SURG SZ 8 L12IN FNGR THK79MIL GRN LTX FREE

## (undated) DEVICE — RESERVOIR,SUCTION,100CC,SILICONE: Brand: MEDLINE

## (undated) DEVICE — NEEDLE HYPO 25GA L1.5IN BLU POLYPR HUB S STL REG BVL STR

## (undated) DEVICE — ATTUNE SOLO PINNING SYSTEM

## (undated) DEVICE — FOAM BUMP, LARGE: Brand: MEDLINE INDUSTRIES, INC.

## (undated) DEVICE — GARMENT,MEDLINE,DVT,INT,CALF,MED, GEN2: Brand: MEDLINE

## (undated) DEVICE — STRAP,POSITIONING,KNEE/BODY,FOAM,4X60": Brand: MEDLINE

## (undated) DEVICE — SUT ETHLN 3-0 18IN PS2 BLK --

## (undated) DEVICE — ZIMMER® STERILE DISPOSABLE TOURNIQUET CUFF WITH PROTECTIVE SLEEVE AND PLC, DUAL PORT, SINGLE BLADDER, 34 IN. (86 CM)

## (undated) DEVICE — SUTURE ABSORBABLE MONOFILAMENT 1 CTX 36 CM 48 MM VIO PDS +

## (undated) DEVICE — ELECTRODE BLDE L4IN NONINSULATED EDGE

## (undated) DEVICE — SOL IRR SOD CL 0.9% 1000ML BTL --

## (undated) DEVICE — SOLUTION WND IRRIGATION 450 ML 0.5 PVP-I 0.9 NACL

## (undated) DEVICE — 450 ML BOTTLE OF 0.05% CHLORHEXIDINE GLUCONATE IN 99.95% STERILE WATER FOR IRRIGATION, USP AND APPLICATOR.: Brand: IRRISEPT ANTIMICROBIAL WOUND LAVAGE

## (undated) DEVICE — Device: Brand: JELCO

## (undated) DEVICE — BONE PREPARATION KIT: Brand: BIOPREP

## (undated) DEVICE — DRAPE,REIN 53X77,STERILE: Brand: MEDLINE

## (undated) DEVICE — SUTURE PLN GUT SZ 4-0 P-3 L18IN ABSRB YELLOWISH TAN L13MM 1644G

## (undated) DEVICE — ELECTRO LUBE IS A SINGLE PATIENT USE DEVICE THAT IS INTENDED TO BE USED ON ELECTROSURGICAL ELECTRODES TO REDUCE STICKING.: Brand: KEY SURGICAL ELECTRO LUBE

## (undated) DEVICE — SYR 10ML CTRL LR LCK NSAF LF --

## (undated) DEVICE — PENCIL SMK EVAC ALL IN 1 DSGN ENH VISIBILITY IMPROVED AIR

## (undated) DEVICE — 4-PORT MANIFOLD: Brand: NEPTUNE 2

## (undated) DEVICE — MAGNETIC INSTR DRAPE 20X16: Brand: MEDLINE INDUSTRIES, INC.

## (undated) DEVICE — STRYKER PERFORMANCE SERIES SAGITTAL BLADE: Brand: STRYKER PERFORMANCE SERIES

## (undated) DEVICE — SPONGE: SPECIALTY PEANUT XR 100/CS: Brand: MEDICAL ACTION INDUSTRIES

## (undated) DEVICE — HOOD SURG T7

## (undated) DEVICE — PACK,EENT,TURBAN DRAPE,PK II: Brand: MEDLINE

## (undated) DEVICE — SOLUTION IRRIG 1000ML H2O PIC PLAS SHATTERPROOF CONTAINER

## (undated) DEVICE — SOL IRR SOD CHL 0.9% TITAN XL CNTNR 3000ML

## (undated) DEVICE — 1010 S-DRAPE TOWEL DRAPE 10/BX: Brand: STERI-DRAPE™

## (undated) DEVICE — PROBE 8225101 5PK STD PRASS FL TIP ROHS

## (undated) DEVICE — GLOVE ORTHO 8   MSG9480

## (undated) DEVICE — TOWEL SURG W17XL27IN STD BLU COT NONFENESTRATED PREWASHED

## (undated) DEVICE — YANKAUER,OPEN TIP,W/O VENT,STERILE: Brand: MEDLINE INDUSTRIES, INC.